# Patient Record
Sex: FEMALE | Race: WHITE | NOT HISPANIC OR LATINO
[De-identification: names, ages, dates, MRNs, and addresses within clinical notes are randomized per-mention and may not be internally consistent; named-entity substitution may affect disease eponyms.]

---

## 2017-03-03 PROBLEM — Z00.00 ENCOUNTER FOR PREVENTIVE HEALTH EXAMINATION: Status: ACTIVE | Noted: 2017-03-03

## 2017-03-08 ENCOUNTER — APPOINTMENT (OUTPATIENT)
Dept: UROGYNECOLOGY | Facility: CLINIC | Age: 52
End: 2017-03-08

## 2017-03-08 VITALS
BODY MASS INDEX: 17.58 KG/M2 | HEIGHT: 64 IN | WEIGHT: 103 LBS | DIASTOLIC BLOOD PRESSURE: 70 MMHG | SYSTOLIC BLOOD PRESSURE: 104 MMHG

## 2017-03-08 DIAGNOSIS — Z78.9 OTHER SPECIFIED HEALTH STATUS: ICD-10-CM

## 2017-03-08 DIAGNOSIS — Z86.79 PERSONAL HISTORY OF OTHER DISEASES OF THE CIRCULATORY SYSTEM: ICD-10-CM

## 2017-03-08 DIAGNOSIS — N95.2 POSTMENOPAUSAL ATROPHIC VAGINITIS: ICD-10-CM

## 2017-03-08 LAB
BILIRUB UR QL STRIP: NORMAL
CLARITY UR: CLEAR
COLLECTION METHOD: NORMAL
GLUCOSE UR-MCNC: NORMAL
HCG UR QL: 0.2 EU/DL
HGB UR QL STRIP.AUTO: NORMAL
KETONES UR-MCNC: NORMAL
LEUKOCYTE ESTERASE UR QL STRIP: NORMAL
NITRITE UR QL STRIP: NORMAL
PH UR STRIP: 7
PROT UR STRIP-MCNC: NORMAL
SP GR UR STRIP: 1.01

## 2017-03-09 PROBLEM — N95.2 VAGINAL ATROPHY: Status: ACTIVE | Noted: 2017-03-09

## 2017-04-06 ENCOUNTER — RESULT CHARGE (OUTPATIENT)
Age: 52
End: 2017-04-06

## 2017-04-06 ENCOUNTER — APPOINTMENT (OUTPATIENT)
Dept: UROGYNECOLOGY | Facility: CLINIC | Age: 52
End: 2017-04-06

## 2017-04-06 VITALS
SYSTOLIC BLOOD PRESSURE: 130 MMHG | DIASTOLIC BLOOD PRESSURE: 82 MMHG | WEIGHT: 103 LBS | BODY MASS INDEX: 17.58 KG/M2 | HEIGHT: 64 IN

## 2017-04-06 DIAGNOSIS — R35.1 NOCTURIA: ICD-10-CM

## 2017-04-06 DIAGNOSIS — Z86.79 PERSONAL HISTORY OF OTHER DISEASES OF THE CIRCULATORY SYSTEM: ICD-10-CM

## 2017-04-06 DIAGNOSIS — Z87.19 PERSONAL HISTORY OF OTHER DISEASES OF THE DIGESTIVE SYSTEM: ICD-10-CM

## 2017-04-06 DIAGNOSIS — N81.4 UTEROVAGINAL PROLAPSE, UNSPECIFIED: ICD-10-CM

## 2017-04-06 LAB
BILIRUB UR QL STRIP: NEGATIVE
CLARITY UR: CLEAR
COLLECTION METHOD: NORMAL
GLUCOSE UR-MCNC: NEGATIVE
HCG UR QL: 0.2 EU/DL
HGB UR QL STRIP.AUTO: NEGATIVE
KETONES UR-MCNC: NEGATIVE
LEUKOCYTE ESTERASE UR QL STRIP: NEGATIVE
NITRITE UR QL STRIP: NEGATIVE
PH UR STRIP: 7
PROT UR STRIP-MCNC: NEGATIVE
SP GR UR STRIP: 1.01

## 2017-04-06 RX ORDER — MESALAMINE 500 MG/1
500 CAPSULE ORAL
Qty: 240 | Refills: 0 | Status: ACTIVE | COMMUNITY
Start: 2016-03-11

## 2017-04-07 ENCOUNTER — RESULT REVIEW (OUTPATIENT)
Age: 52
End: 2017-04-07

## 2017-04-07 LAB
APPEARANCE: CLEAR
BACTERIA: NEGATIVE
BILIRUBIN URINE: NEGATIVE
BLOOD URINE: NEGATIVE
COLOR: YELLOW
GLUCOSE QUALITATIVE U: NORMAL MG/DL
HYALINE CASTS: 0 /LPF
KETONES URINE: NEGATIVE
LEUKOCYTE ESTERASE URINE: NEGATIVE
MICROSCOPIC-UA: NORMAL
NITRITE URINE: NEGATIVE
PH URINE: 6.5
PROTEIN URINE: NEGATIVE MG/DL
RED BLOOD CELLS URINE: 2 /HPF
SPECIFIC GRAVITY URINE: 1
SQUAMOUS EPITHELIAL CELLS: 0 /HPF
UROBILINOGEN URINE: NORMAL MG/DL
WHITE BLOOD CELLS URINE: 0 /HPF

## 2017-04-10 LAB — BACTERIA UR CULT: NORMAL

## 2017-04-11 LAB — CORE LAB FLUID CYTOLOGY: NORMAL

## 2020-11-17 ENCOUNTER — TRANSCRIPTION ENCOUNTER (OUTPATIENT)
Age: 55
End: 2020-11-17

## 2023-08-31 ENCOUNTER — APPOINTMENT (OUTPATIENT)
Dept: SURGERY | Facility: CLINIC | Age: 58
End: 2023-08-31
Payer: COMMERCIAL

## 2023-08-31 VITALS
WEIGHT: 98 LBS | HEIGHT: 60 IN | SYSTOLIC BLOOD PRESSURE: 136 MMHG | DIASTOLIC BLOOD PRESSURE: 89 MMHG | BODY MASS INDEX: 19.24 KG/M2 | HEART RATE: 96 BPM | TEMPERATURE: 98 F | OXYGEN SATURATION: 98 % | RESPIRATION RATE: 18 BRPM

## 2023-08-31 DIAGNOSIS — K50.90 CROHN'S DISEASE, UNSPECIFIED, W/OUT COMPLICATIONS: ICD-10-CM

## 2023-08-31 PROCEDURE — 99204 OFFICE O/P NEW MOD 45 MIN: CPT

## 2023-08-31 RX ORDER — RIFAXIMIN 550 MG/1
TABLET ORAL
Refills: 0 | Status: ACTIVE | COMMUNITY

## 2023-08-31 RX ORDER — CHOLECALCIFEROL (VITAMIN D3) 25 MCG
TABLET ORAL
Refills: 0 | Status: ACTIVE | COMMUNITY

## 2023-08-31 RX ORDER — AMOXICILLIN 500 MG/1
500 CAPSULE ORAL
Qty: 21 | Refills: 0 | Status: DISCONTINUED | COMMUNITY
Start: 2017-02-20 | End: 2023-08-31

## 2023-08-31 RX ORDER — IBUPROFEN 600 MG/1
600 TABLET, FILM COATED ORAL
Qty: 20 | Refills: 0 | Status: DISCONTINUED | COMMUNITY
Start: 2017-02-20 | End: 2023-08-31

## 2023-08-31 RX ORDER — ESTRADIOL 0.1 MG/G
0.1 CREAM VAGINAL
Qty: 1 | Refills: 3 | Status: DISCONTINUED | COMMUNITY
Start: 2017-03-09 | End: 2023-08-31

## 2023-08-31 RX ORDER — AZATHIOPRINE 50 1/1
50 TABLET ORAL
Qty: 45 | Refills: 0 | Status: DISCONTINUED | COMMUNITY
Start: 2016-03-11 | End: 2023-08-31

## 2023-08-31 NOTE — ASSESSMENT
[FreeTextEntry1] : I have seen and evaluated patient and I have corroborated all nursing input into this note.  Patient with symptomatic Crohn's strictures.  She limits her diet and has postprandial pain.  Recently she has had some weight loss.  An MR enterography demonstrated multiple strictures with active inflammation.  Recent colonoscopy demonstrated narrowing at the ileocolic junction.  I reviewed the case with the patient's gastroenterologist, Dr. Garrett.  The patient had been on Remicade a few years ago but had a reaction and it was discontinued.  She has not been on any other biologic therapy.  The patient will consult with advanced GI to see if she is a candidate for stricture dilation.  In addition, a trial of one of the newer biologic therapies will be considered.  If these nonsurgical options are unsuccessful the patient will return to my office to discuss surgery further and to make the appropriate arrangements.

## 2023-08-31 NOTE — HISTORY OF PRESENT ILLNESS
[FreeTextEntry1] : Candace is a 56 y/o female here for a consultation visit, multiple small bowel strictures with inflammation  S/p laparoscopic - assisted small bowel resection, laparoscopic appendectomy, and Meckel's diverticulectomy due to small bowel stricture/mass (possible Crohn's disease and Meckel's diverticulum on 7/19/04 by Dr. Betts  Colonoscopy on 7/25/23 - One 2 mm polyp at 50 cm proximal to the anus, removed with a cold biopsy forceps. Resected and retrieved. Stricture at the ileocolonic anastomosis.  Biopsied. Normal mucosa in the entire examined colon. Biopsied. Internal hemorrhoids. External hemorrhoids.   MR Enterography 08/18/23 - multiple small bowel strictures with imaging findings of active inflammation.  The degree of inflammation appears mildly increased when compared to 04/04/18.    Today patient reports she gets routine imaging of her abdomen every 2-3 years for Crohn's disease monitoring which she was diagnosed 19 years ago.   Patient was taking Remicade more than 4 years ago but had bad reaction so only took it for approximately 3 months.   Today pt reports no pain.  Does get random pain of abdomen (especially after eating large meals). Pain has been on-and-off for a year. Denies nausea and vomiting. Denies fever and chills. Every 3-4 days BMs, formed normal but has been loose recently and have urgency and lost 3-4 lbs since May of this year.  Does take Pentasa and Xifaxan for Crohn's. Low appetite (normal for pt). Not taking anticoagulants.

## 2023-08-31 NOTE — PHYSICAL EXAM
[Normal Breath Sounds] : Normal breath sounds [Normal Heart Sounds] : normal heart sounds [No Rash or Lesion] : No rash or lesion [Alert] : alert [Oriented to Person] : oriented to person [Oriented to Place] : oriented to place [Oriented to Time] : oriented to time [Calm] : calm [Abdomen Masses] : No abdominal masses [Abdomen Tenderness] : ~T No ~M abdominal tenderness [de-identified] : Mild distention.  [de-identified] : WNL [de-identified] : WNL [de-identified] : BKL [de-identified] : WNL ROM [de-identified] : WNL

## 2023-08-31 NOTE — END OF VISIT
Scheduled appt for Thursday . Mom confirmed date and time   [Time Spent: ___ minutes] : I have spent [unfilled] minutes of time on the encounter.

## 2023-08-31 NOTE — CONSULT LETTER
[Dear  ___] : Dear ~HARRIS, [Courtesy Letter:] : I had the pleasure of seeing your patient, [unfilled], in my office today. [Please see my note below.] : Please see my note below. [Consult Closing:] : Thank you very much for allowing me to participate in the care of this patient.  If you have any questions, please do not hesitate to contact me. [Sincerely,] : Sincerely, [FreeTextEntry2] : Dr. Glenn Garrett [FreeTextEntry3] : Patrick Betts M.D., F.GEORGETTE.C.S., F.A.S.C.R.S. Chief Colorectal Clinical Services, [facility] [DrArnold  ___] : Dr. CRUZ

## 2023-10-01 ENCOUNTER — NON-APPOINTMENT (OUTPATIENT)
Age: 58
End: 2023-10-01

## 2023-10-25 ENCOUNTER — APPOINTMENT (OUTPATIENT)
Dept: GASTROENTEROLOGY | Facility: CLINIC | Age: 58
End: 2023-10-25
Payer: COMMERCIAL

## 2023-10-25 VITALS
WEIGHT: 95 LBS | SYSTOLIC BLOOD PRESSURE: 125 MMHG | DIASTOLIC BLOOD PRESSURE: 75 MMHG | BODY MASS INDEX: 18.65 KG/M2 | HEIGHT: 60 IN | OXYGEN SATURATION: 98 % | HEART RATE: 99 BPM

## 2023-10-25 PROCEDURE — 99205 OFFICE O/P NEW HI 60 MIN: CPT

## 2024-01-02 ENCOUNTER — APPOINTMENT (OUTPATIENT)
Dept: GASTROENTEROLOGY | Facility: HOSPITAL | Age: 59
End: 2024-01-02

## 2024-04-22 ENCOUNTER — APPOINTMENT (OUTPATIENT)
Dept: COLORECTAL SURGERY | Facility: CLINIC | Age: 59
End: 2024-04-22
Payer: COMMERCIAL

## 2024-04-22 VITALS
HEART RATE: 106 BPM | DIASTOLIC BLOOD PRESSURE: 90 MMHG | BODY MASS INDEX: 19.04 KG/M2 | WEIGHT: 97 LBS | OXYGEN SATURATION: 100 % | SYSTOLIC BLOOD PRESSURE: 146 MMHG | HEIGHT: 60 IN

## 2024-04-22 DIAGNOSIS — K50.813 CROHN'S DISEASE OF BOTH SMALL AND LARGE INTESTINE WITH FISTULA: ICD-10-CM

## 2024-04-22 PROCEDURE — 99203 OFFICE O/P NEW LOW 30 MIN: CPT

## 2024-04-22 PROCEDURE — 99213 OFFICE O/P EST LOW 20 MIN: CPT

## 2024-04-23 NOTE — PROCEDURE
[FreeTextEntry1] : Colonoscopy 7/25/2023 (Dr. Garrett) (ileocolonic anastomosis reached - not traversed)  The ileocolonic anastomosis contained a moderate stenosis that was not able to be full traversed with the pediatric colonoscope. There was a large amount of blood from the site.  (1) 2mm polyp at 50cm prixmal to the anus removed stricture at ileocolonic anastomosis  Pathology (reviewed ) (pg 5 of 21)  5. colon polyp bx polypoid low grade dysplasia surrounding mucosa with no evidence of active colitis or granuloma

## 2024-04-23 NOTE — HISTORY OF PRESENT ILLNESS
[FreeTextEntry1] : Referred by Dr. 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 Betts) on lap SBR (2004)for a SBO She subsequently had intermittent symptoms and was not on systemic therapy for a long period of time Trialed Remicade 4 years ago with lip swelling so stopped Currently on Rinvoq  being evaluated for a different biologic (oral, ?ARNOL inhibitor?)  Her last colonoscopy was 7/2023  She is having bloating, gurgling. Weight is stable.  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

## 2024-04-23 NOTE — ASSESSMENT
[FreeTextEntry1] : 58 y.o female with crohns disease and hx of resection   She has subacute bowel obstruction with distention.  I strongly do believe as stated and advised she should have surgery within 1 month, she would like to work until the end of the year. Will need bowel resection with possible temporary ileostomy (20%)  I am concerned about her and I am ready for the surgery whenever she is ready.  I have provided her my cell phone number.  She is going to need a resection of the previous anastomosis and sigmoid resection and lysis of additions and assessing the area of the rest of the small bowel.  All risk benefits morbidity were explained she consents. 5-7 days in the hospital  4-6 weeks recovery

## 2024-04-23 NOTE — DATA REVIEWED
[FreeTextEntry1] : MRE 8/18/2023  Gallbladder - stable mild intrahepatic and extrahepatic biliary dilatation. stable contracted gallbladder versus dilated remnant cystic duct s/p cholecystectomy.  Terminal Ileum: there is severe wall thickening and mural hyperenhancement over a 12.5cm length of terminal ileum. This is associated with stricturing and muld upstream bowel dilatation measuring up to 3.2cm. Again seen are several small submucosal nodules measuring up to 0.9cm  Additional discontinous areas of active inflammation and stricturing involving the mid small bowel is evident for example on... One of these sites includes a small bowel anastomosis where there is upstream bowel dilatation measuring up to 4.6cm.  These areas are associated with restricted diffusion. There is no abscess  Colon/Rectum: New mural hyperenhancement and wall thickening of the rectosigmoid colon with restricted diffusion. Impression: Multiple small bowel strictures with imaging findings of active inflammation. The degree of inflammation appears mildly increased when compared to 04/04/2018.  New active inflammation in the rectosigmoid colon. No abscess or fistula.

## 2024-04-23 NOTE — PHYSICAL EXAM
[No Rash or Lesion] : No rash or lesion [Alert] : alert [Oriented to Person] : oriented to person [Oriented to Place] : oriented to place [Oriented to Time] : oriented to time [de-identified] : +tympanetic [de-identified] : thin female, NAD [de-identified] : [place]/AT [facility] [de-identified] : No C/C/E

## 2024-05-02 ENCOUNTER — NON-APPOINTMENT (OUTPATIENT)
Age: 59
End: 2024-05-02

## 2024-05-23 ENCOUNTER — APPOINTMENT (OUTPATIENT)
Dept: COLORECTAL SURGERY | Facility: CLINIC | Age: 59
End: 2024-05-23
Payer: COMMERCIAL

## 2024-05-23 ENCOUNTER — OUTPATIENT (OUTPATIENT)
Dept: OUTPATIENT SERVICES | Facility: HOSPITAL | Age: 59
LOS: 1 days | End: 2024-05-23
Payer: COMMERCIAL

## 2024-05-23 VITALS
DIASTOLIC BLOOD PRESSURE: 92 MMHG | HEART RATE: 89 BPM | WEIGHT: 100 LBS | SYSTOLIC BLOOD PRESSURE: 145 MMHG | BODY MASS INDEX: 19.63 KG/M2 | OXYGEN SATURATION: 100 % | HEIGHT: 60 IN | RESPIRATION RATE: 16 BRPM

## 2024-05-23 VITALS
SYSTOLIC BLOOD PRESSURE: 128 MMHG | RESPIRATION RATE: 18 BRPM | DIASTOLIC BLOOD PRESSURE: 82 MMHG | TEMPERATURE: 98 F | OXYGEN SATURATION: 98 % | WEIGHT: 96.56 LBS | HEART RATE: 76 BPM | HEIGHT: 60 IN

## 2024-05-23 DIAGNOSIS — Z90.711 ACQUIRED ABSENCE OF UTERUS WITH REMAINING CERVICAL STUMP: Chronic | ICD-10-CM

## 2024-05-23 DIAGNOSIS — Z98.890 OTHER SPECIFIED POSTPROCEDURAL STATES: Chronic | ICD-10-CM

## 2024-05-23 DIAGNOSIS — Z01.818 ENCOUNTER FOR OTHER PREPROCEDURAL EXAMINATION: ICD-10-CM

## 2024-05-23 DIAGNOSIS — K50.813 CROHN'S DISEASE OF BOTH SMALL AND LARGE INTESTINE WITH FISTULA: ICD-10-CM

## 2024-05-23 DIAGNOSIS — Z29.9 ENCOUNTER FOR PROPHYLACTIC MEASURES, UNSPECIFIED: ICD-10-CM

## 2024-05-23 DIAGNOSIS — Z90.49 ACQUIRED ABSENCE OF OTHER SPECIFIED PARTS OF DIGESTIVE TRACT: Chronic | ICD-10-CM

## 2024-05-23 LAB
ANION GAP SERPL CALC-SCNC: 11 MMOL/L — SIGNIFICANT CHANGE UP (ref 5–17)
BLD GP AB SCN SERPL QL: NEGATIVE — SIGNIFICANT CHANGE UP
BUN SERPL-MCNC: 9 MG/DL — SIGNIFICANT CHANGE UP (ref 7–23)
CALCIUM SERPL-MCNC: 10.1 MG/DL — SIGNIFICANT CHANGE UP (ref 8.4–10.5)
CHLORIDE SERPL-SCNC: 104 MMOL/L — SIGNIFICANT CHANGE UP (ref 96–108)
CO2 SERPL-SCNC: 24 MMOL/L — SIGNIFICANT CHANGE UP (ref 22–31)
CREAT SERPL-MCNC: 0.67 MG/DL — SIGNIFICANT CHANGE UP (ref 0.5–1.3)
EGFR: 103 ML/MIN/1.73M2 — SIGNIFICANT CHANGE UP
GLUCOSE SERPL-MCNC: 97 MG/DL — SIGNIFICANT CHANGE UP (ref 70–99)
HCT VFR BLD CALC: 34.5 % — SIGNIFICANT CHANGE UP (ref 34.5–45)
HGB BLD-MCNC: 11.8 G/DL — SIGNIFICANT CHANGE UP (ref 11.5–15.5)
MCHC RBC-ENTMCNC: 29.5 PG — SIGNIFICANT CHANGE UP (ref 27–34)
MCHC RBC-ENTMCNC: 34.2 GM/DL — SIGNIFICANT CHANGE UP (ref 32–36)
MCV RBC AUTO: 86.3 FL — SIGNIFICANT CHANGE UP (ref 80–100)
NRBC # BLD: 0 /100 WBCS — SIGNIFICANT CHANGE UP (ref 0–0)
PLATELET # BLD AUTO: 229 K/UL — SIGNIFICANT CHANGE UP (ref 150–400)
POTASSIUM SERPL-MCNC: 3.9 MMOL/L — SIGNIFICANT CHANGE UP (ref 3.5–5.3)
POTASSIUM SERPL-SCNC: 3.9 MMOL/L — SIGNIFICANT CHANGE UP (ref 3.5–5.3)
RBC # BLD: 4 M/UL — SIGNIFICANT CHANGE UP (ref 3.8–5.2)
RBC # FLD: 13.5 % — SIGNIFICANT CHANGE UP (ref 10.3–14.5)
RH IG SCN BLD-IMP: POSITIVE — SIGNIFICANT CHANGE UP
SODIUM SERPL-SCNC: 139 MMOL/L — SIGNIFICANT CHANGE UP (ref 135–145)
WBC # BLD: 5.46 K/UL — SIGNIFICANT CHANGE UP (ref 3.8–10.5)
WBC # FLD AUTO: 5.46 K/UL — SIGNIFICANT CHANGE UP (ref 3.8–10.5)

## 2024-05-23 PROCEDURE — 99213 OFFICE O/P EST LOW 20 MIN: CPT

## 2024-05-23 PROCEDURE — 86850 RBC ANTIBODY SCREEN: CPT

## 2024-05-23 PROCEDURE — 86900 BLOOD TYPING SEROLOGIC ABO: CPT

## 2024-05-23 PROCEDURE — 80048 BASIC METABOLIC PNL TOTAL CA: CPT

## 2024-05-23 PROCEDURE — 86901 BLOOD TYPING SEROLOGIC RH(D): CPT

## 2024-05-23 PROCEDURE — 85027 COMPLETE CBC AUTOMATED: CPT

## 2024-05-23 PROCEDURE — G0463: CPT

## 2024-05-23 RX ORDER — CEFOTETAN DISODIUM 1 G
2 VIAL (EA) INJECTION ONCE
Refills: 0 | Status: DISCONTINUED | OUTPATIENT
Start: 2024-05-29 | End: 2024-06-01

## 2024-05-23 NOTE — H&P PST ADULT - ATTENDING COMMENTS
for exp lap , possible LX,ileocolic resection plus minus loop ileostomy. Risks benefits were explained she consents  felice Morales

## 2024-05-23 NOTE — H&P PST ADULT - NSICDXPROCEDURE_GEN_ALL_CORE_FT
PROCEDURES:  Single incision laparoscopic small bowel resection 23-May-2024 17:16:23  Julieth Francis

## 2024-05-23 NOTE — H&P PST ADULT - NSICDXPASTSURGICALHX_GEN_ALL_CORE_FT
PAST SURGICAL HISTORY:  History of bladder surgery     History of colonoscopy     History of partial hysterectomy     NVD (normal vaginal delivery)     Status post small bowel resection

## 2024-05-23 NOTE — H&P PST ADULT - HISTORY OF PRESENT ILLNESS
58yr old female presents to [facility] for a scheduled Laparoscopic Small Bowel Resection Stricturoplasty and Laparoscopic Ileostomy Creation on 5/29/2024. PMH of Crohn's dx in the small bowel (dx'd in 2004 Dr. Betts) s/p lap SBR 2004 for a SBO. She now presents to [facility] for a scheduled Laparoscopic Small Bowel Resection Stricturoplasty and Laparoscopic Ileostomy Creation on 5/29/2024. Denies recent fevers, chills, cough, chest pain or SOB and feels well otherwise.

## 2024-05-23 NOTE — H&P PST ADULT - NSICDXPASTMEDICALHX_GEN_ALL_CORE_FT
PAST MEDICAL HISTORY:  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     History of Crohn's disease

## 2024-05-23 NOTE — H&P PST ADULT - ASSESSMENT
CAPRINI SCORE [CLOT updated 18]    AGE RELATED RISK FACTORS                                                       MOBILITY RELATED FACTORS  [ ] Age 41-60 years                                            (1 Point)                    [ ] Bed rest                                                        (1 Point)  [ ] Age: 61-74 years                                           (2 Points)                  [ ] Plaster cast                                                   (2 Points)  [ ] Age= 75 years                                              (3 Points)                    [ ] Bed bound for more than 72 hours                 (2 Points)    DISEASE RELATED RISK FACTORS                                               GENDER SPECIFIC FACTORS  [ ] Edema in the lower extremities                       (1 Point)              [ ] Pregnancy                                                     (1 Point)  [ ] Varicose veins                                               (1 Point)                     [ ] Post-partum < 6 weeks                                   (1 Point)             [ ] BMI > 25 Kg/m2                                            (1 Point)                     [ ] Hormonal therapy  or oral contraception          (1 Point)                 [ ] Sepsis (in the previous month)                        (1 Point)               [ ] History of pregnancy complications                 (1 point)  [ ] Pneumonia or serious lung disease                                               [ ] Unexplained or recurrent                     (1 Point)           (in the previous month)                               (1 Point)  [ ] Abnormal pulmonary function test                     (1 Point)                 SURGERY RELATED RISK FACTORS  [ ] Acute myocardial infarction                              (1 Point)               [ ]  Section                                             (1 Point)  [ ] Congestive heart failure (in the previous month)  (1 Point)      [ ] Minor surgery                                                  (1 Point)   [ ] Inflammatory bowel disease                             (1 Point)               [ ] Arthroscopic surgery                                        (2 Points)  [ ] Central venous access                                      (2 Points)                [ ] General surgery lasting more than 45 minutes (2 points)  [ ] Present or previous malignancy                     (2 Points)                [ ] Elective arthroplasty                                         (5 points)    [ ] Stroke (in the previous month)                          (5 Points)                                                                                                                                                           HEMATOLOGY RELATED FACTORS                                                 TRAUMA RELATED RISK FACTORS  [ ] Prior episodes of VTE                                     (3 Points)                [ ] Fracture of the hip, pelvis, or leg                       (5 Points)  [ ] Positive family history for VTE                         (3 Points)             [ ] Acute spinal cord injury (in the previous month)  (5 Points)  [ ] Prothrombin 14691 A                                     (3 Points)               [ ] Paralysis  (less than 1 month)                             (5 Points)  [ ] Factor V Leiden                                             (3 Points)                  [ ] Multiple Trauma within 1 month                        (5 Points)  [ ] Lupus anticoagulants                                     (3 Points)                                                           [ ] Anticardiolipin antibodies                               (3 Points)                                                       [ ] High homocysteine in the blood                      (3 Points)                                             [ ] Other congenital or acquired thrombophilia      (3 Points)                                                [ ] Heparin induced thrombocytopenia                  (3 Points)                                     Total Score [ 4  ]  Denies loose/cracked/removable teeth  Mallampati I

## 2024-05-23 NOTE — H&P PST ADULT - PROBLEM SELECTOR PLAN 1
Scheduled for sx on 5/29/2024. Surgical and chlorhexidine instructions reviewed and provided to pt. NPO post 11pm night before. As per pt to follow clear liquids one day before.

## 2024-05-28 ENCOUNTER — TRANSCRIPTION ENCOUNTER (OUTPATIENT)
Age: 59
End: 2024-05-28

## 2024-05-28 RX ORDER — ALENDRONATE SODIUM 35 MG/1
TABLET ORAL
Refills: 0 | Status: ACTIVE | COMMUNITY

## 2024-05-28 NOTE — HISTORY OF PRESENT ILLNESS
[FreeTextEntry1] : Referred by Dr. 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 Betts) on lap SBR (2004)for a SBO She subsequently had intermittent symptoms and was not on systemic therapy for a long period of time Trialed Remicade 4 years ago with lip swelling so stopped Currently on Rinvoq  5/23/2024 Here for a pre-op visit, discussed procedure. Last Rinvoq 1 week ago. She is still able to eat. No change in bowel habits. No nausea/vomiting.

## 2024-05-28 NOTE — ASSESSMENT
[FreeTextEntry1] : 58 y.o female with crohns disease and hx of resection   She has subacute bowel obstruction with distention. I strongly do believe as stated and advised she should have surgery within 1 month, she would like to work until the end of the year. Will need bowel resection with possible temporary ileostomy (20%)  I am concerned about her and I am ready for the surgery whenever she is ready. I have provided her my cell phone number. She is going to need a resection of the previous anastomosis and sigmoid resection and lysis of additions and assessing the area of the rest of the small bowel. All risk benefits morbidity were explained she consents. 5-7 days in the hospital 4-6 weeks recovery.  5/23/2024 - She is prepared for surgery. We discussed the risk, benefits and alternatives and she consents.Disussed bowel prep - clear liquid diet. She will go to PST. She met with Shilpa and we discussed that she may need a temporary ileostomy. Edwige Lux

## 2024-05-28 NOTE — PHYSICAL EXAM
[No Rash or Lesion] : No rash or lesion [Alert] : alert [Oriented to Person] : oriented to person [Oriented to Place] : oriented to place [Oriented to Time] : oriented to time [Calm] : calm [de-identified] : bloated [de-identified] : WDWN female, NAD [de-identified] : [place]/AT [facility] [de-identified] : No C/C/E noted

## 2024-05-29 ENCOUNTER — RESULT REVIEW (OUTPATIENT)
Age: 59
End: 2024-05-29

## 2024-05-29 ENCOUNTER — APPOINTMENT (OUTPATIENT)
Dept: COLORECTAL SURGERY | Facility: HOSPITAL | Age: 59
End: 2024-05-29
Payer: COMMERCIAL

## 2024-05-29 ENCOUNTER — INPATIENT (INPATIENT)
Facility: HOSPITAL | Age: 59
LOS: 11 days | Discharge: HOME HEALTH | DRG: 387 | End: 2024-06-10
Attending: COLON & RECTAL SURGERY | Admitting: COLON & RECTAL SURGERY
Payer: COMMERCIAL

## 2024-05-29 VITALS
OXYGEN SATURATION: 100 % | DIASTOLIC BLOOD PRESSURE: 84 MMHG | HEIGHT: 60 IN | RESPIRATION RATE: 18 BRPM | WEIGHT: 96.56 LBS | SYSTOLIC BLOOD PRESSURE: 138 MMHG | HEART RATE: 97 BPM | TEMPERATURE: 98 F

## 2024-05-29 DIAGNOSIS — Z98.890 OTHER SPECIFIED POSTPROCEDURAL STATES: Chronic | ICD-10-CM

## 2024-05-29 DIAGNOSIS — K50.813 CROHN'S DISEASE OF BOTH SMALL AND LARGE INTESTINE WITH FISTULA: ICD-10-CM

## 2024-05-29 DIAGNOSIS — Z90.49 ACQUIRED ABSENCE OF OTHER SPECIFIED PARTS OF DIGESTIVE TRACT: Chronic | ICD-10-CM

## 2024-05-29 DIAGNOSIS — Z90.711 ACQUIRED ABSENCE OF UTERUS WITH REMAINING CERVICAL STUMP: Chronic | ICD-10-CM

## 2024-05-29 LAB
ANION GAP SERPL CALC-SCNC: 15 MMOL/L — SIGNIFICANT CHANGE UP (ref 5–17)
BUN SERPL-MCNC: 8 MG/DL — SIGNIFICANT CHANGE UP (ref 7–23)
CALCIUM SERPL-MCNC: 7.6 MG/DL — LOW (ref 8.4–10.5)
CHLORIDE SERPL-SCNC: 103 MMOL/L — SIGNIFICANT CHANGE UP (ref 96–108)
CO2 SERPL-SCNC: 21 MMOL/L — LOW (ref 22–31)
CREAT SERPL-MCNC: 0.7 MG/DL — SIGNIFICANT CHANGE UP (ref 0.5–1.3)
EGFR: 101 ML/MIN/1.73M2 — SIGNIFICANT CHANGE UP
GLUCOSE SERPL-MCNC: 186 MG/DL — HIGH (ref 70–99)
HCT VFR BLD CALC: 31.4 % — LOW (ref 34.5–45)
HGB BLD-MCNC: 10.8 G/DL — LOW (ref 11.5–15.5)
MCHC RBC-ENTMCNC: 29.8 PG — SIGNIFICANT CHANGE UP (ref 27–34)
MCHC RBC-ENTMCNC: 34.4 GM/DL — SIGNIFICANT CHANGE UP (ref 32–36)
MCV RBC AUTO: 86.5 FL — SIGNIFICANT CHANGE UP (ref 80–100)
NRBC # BLD: 0 /100 WBCS — SIGNIFICANT CHANGE UP (ref 0–0)
PLATELET # BLD AUTO: 275 K/UL — SIGNIFICANT CHANGE UP (ref 150–400)
POTASSIUM SERPL-MCNC: 3.4 MMOL/L — LOW (ref 3.5–5.3)
POTASSIUM SERPL-SCNC: 3.4 MMOL/L — LOW (ref 3.5–5.3)
RBC # BLD: 3.63 M/UL — LOW (ref 3.8–5.2)
RBC # FLD: 13.7 % — SIGNIFICANT CHANGE UP (ref 10.3–14.5)
SODIUM SERPL-SCNC: 139 MMOL/L — SIGNIFICANT CHANGE UP (ref 135–145)
WBC # BLD: 17.46 K/UL — HIGH (ref 3.8–10.5)
WBC # FLD AUTO: 17.46 K/UL — HIGH (ref 3.8–10.5)

## 2024-05-29 PROCEDURE — 88309 TISSUE EXAM BY PATHOLOGIST: CPT | Mod: 26

## 2024-05-29 PROCEDURE — 44205 LAP COLECTOMY PART W/ILEUM: CPT | Mod: 80

## 2024-05-29 PROCEDURE — 44205 LAP COLECTOMY PART W/ILEUM: CPT

## 2024-05-29 PROCEDURE — 44187 LAP ILEO/JEJUNO-STOMY: CPT

## 2024-05-29 PROCEDURE — 44187 LAP ILEO/JEJUNO-STOMY: CPT | Mod: 80

## 2024-05-29 RX ORDER — POTASSIUM CHLORIDE 20 MEQ
10 PACKET (EA) ORAL
Refills: 0 | Status: COMPLETED | OUTPATIENT
Start: 2024-05-29 | End: 2024-05-29

## 2024-05-29 RX ORDER — ONDANSETRON 8 MG/1
4 TABLET, FILM COATED ORAL EVERY 6 HOURS
Refills: 0 | Status: DISCONTINUED | OUTPATIENT
Start: 2024-05-29 | End: 2024-06-02

## 2024-05-29 RX ORDER — NALOXONE HYDROCHLORIDE 4 MG/.1ML
0.1 SPRAY NASAL
Refills: 0 | Status: DISCONTINUED | OUTPATIENT
Start: 2024-05-29 | End: 2024-06-02

## 2024-05-29 RX ORDER — SODIUM CHLORIDE 9 MG/ML
3 INJECTION INTRAMUSCULAR; INTRAVENOUS; SUBCUTANEOUS EVERY 8 HOURS
Refills: 0 | Status: DISCONTINUED | OUTPATIENT
Start: 2024-05-29 | End: 2024-05-29

## 2024-05-29 RX ORDER — CHLORHEXIDINE GLUCONATE 213 G/1000ML
1 SOLUTION TOPICAL ONCE
Refills: 0 | Status: DISCONTINUED | OUTPATIENT
Start: 2024-05-29 | End: 2024-05-29

## 2024-05-29 RX ORDER — SODIUM CHLORIDE 9 MG/ML
1000 INJECTION, SOLUTION INTRAVENOUS
Refills: 0 | Status: DISCONTINUED | OUTPATIENT
Start: 2024-05-29 | End: 2024-06-10

## 2024-05-29 RX ORDER — LIDOCAINE HCL 20 MG/ML
0.2 VIAL (ML) INJECTION ONCE
Refills: 0 | Status: DISCONTINUED | OUTPATIENT
Start: 2024-05-29 | End: 2024-05-29

## 2024-05-29 RX ORDER — HEPARIN SODIUM 5000 [USP'U]/ML
5000 INJECTION INTRAVENOUS; SUBCUTANEOUS EVERY 8 HOURS
Refills: 0 | Status: DISCONTINUED | OUTPATIENT
Start: 2024-05-29 | End: 2024-06-02

## 2024-05-29 RX ORDER — SODIUM CHLORIDE 9 MG/ML
500 INJECTION, SOLUTION INTRAVENOUS ONCE
Refills: 0 | Status: COMPLETED | OUTPATIENT
Start: 2024-05-29 | End: 2024-05-29

## 2024-05-29 RX ORDER — ONDANSETRON 8 MG/1
4 TABLET, FILM COATED ORAL ONCE
Refills: 0 | Status: DISCONTINUED | OUTPATIENT
Start: 2024-05-29 | End: 2024-05-30

## 2024-05-29 RX ORDER — HYDROMORPHONE HYDROCHLORIDE 2 MG/ML
250 INJECTION INTRAMUSCULAR; INTRAVENOUS; SUBCUTANEOUS
Refills: 0 | Status: DISCONTINUED | OUTPATIENT
Start: 2024-05-29 | End: 2024-06-02

## 2024-05-29 RX ORDER — NALBUPHINE HYDROCHLORIDE 10 MG/ML
2.5 INJECTION, SOLUTION INTRAMUSCULAR; INTRAVENOUS; SUBCUTANEOUS EVERY 6 HOURS
Refills: 0 | Status: DISCONTINUED | OUTPATIENT
Start: 2024-05-29 | End: 2024-06-02

## 2024-05-29 RX ORDER — CHOLECALCIFEROL (VITAMIN D3) 125 MCG
1 CAPSULE ORAL
Refills: 0 | DISCHARGE

## 2024-05-29 RX ORDER — ACETAMINOPHEN 500 MG
750 TABLET ORAL EVERY 6 HOURS
Refills: 0 | Status: COMPLETED | OUTPATIENT
Start: 2024-05-29 | End: 2024-05-30

## 2024-05-29 RX ORDER — PREGABALIN 225 MG/1
1 CAPSULE ORAL
Refills: 0 | DISCHARGE

## 2024-05-29 RX ADMIN — HYDROMORPHONE HYDROCHLORIDE 250 MILLILITER(S): 2 INJECTION INTRAMUSCULAR; INTRAVENOUS; SUBCUTANEOUS at 20:02

## 2024-05-29 RX ADMIN — HYDROMORPHONE HYDROCHLORIDE 250 MILLILITER(S): 2 INJECTION INTRAMUSCULAR; INTRAVENOUS; SUBCUTANEOUS at 21:46

## 2024-05-29 RX ADMIN — SODIUM CHLORIDE 1000 MILLILITER(S): 9 INJECTION, SOLUTION INTRAVENOUS at 19:40

## 2024-05-29 RX ADMIN — HEPARIN SODIUM 5000 UNIT(S): 5000 INJECTION INTRAVENOUS; SUBCUTANEOUS at 21:48

## 2024-05-29 RX ADMIN — HYDROMORPHONE HYDROCHLORIDE 250 MILLILITER(S): 2 INJECTION INTRAMUSCULAR; INTRAVENOUS; SUBCUTANEOUS at 18:43

## 2024-05-29 RX ADMIN — SODIUM CHLORIDE 100 MILLILITER(S): 9 INJECTION, SOLUTION INTRAVENOUS at 21:06

## 2024-05-29 RX ADMIN — Medication 100 MILLIEQUIVALENT(S): at 20:57

## 2024-05-29 RX ADMIN — Medication 100 MILLIEQUIVALENT(S): at 22:09

## 2024-05-29 RX ADMIN — Medication 100 MILLIEQUIVALENT(S): at 23:32

## 2024-05-29 NOTE — PATIENT PROFILE ADULT - FALL HARM RISK - UNIVERSAL INTERVENTIONS
Bed in lowest position, wheels locked, appropriate side rails in place/Call bell, personal items and telephone in reach/Instruct patient to call for assistance before getting out of bed or chair/Non-slip footwear when patient is out of bed/[place] to call system/Physically safe environment - no spills, clutter or unnecessary equipment/Purposeful Proactive Rounding/Room/bathroom lighting operational, light cord in reach

## 2024-05-29 NOTE — BRIEF OPERATIVE NOTE - OPERATION/FINDINGS
Strictures at ileocolic anastomosis and segmental stricture 20cm proximal to that  Diseased segment resected  Primary hand-sewn ileocolic anastomosis with diverting loop ileostomy

## 2024-05-29 NOTE — BRIEF OPERATIVE NOTE - CO SURGEON
Sawyer Morales (Attending) You presented to the ED after a near syncopal episode, likely vasovagal due to dehydration. You were given 2 liters of IV fluids. Follow up with your primary care doctor in 24-48 hours.     WHAT YOU NEED TO KNOW:    Syncope is also called fainting or passing out. Syncope is a sudden, temporary loss of consciousness, followed by a fall from a standing or sitting position. Syncope is usually not a serious problem, and children usually recover quickly after an episode. Syncope can sometimes be a sign of a medical condition that needs to be treated.    DISCHARGE INSTRUCTIONS:    Call 911 for any of the following:     You lose consciousness.    You chest pain and trouble breathing.    Return to the emergency department if:     You have a seizure.    You faint, hit your head, and are bleeding.    You faint when you exercise

## 2024-05-29 NOTE — CHART NOTE - NSCHARTNOTEFT_GEN_A_CORE
SURGERY POST OP CHECK    STATUS POST PROCEDURE:    SUBJECTIVE: Pt seen and examined without complaints. Pain is controlled. Denies CP/SOB/N/V.     OBJECTIVE:  Vital Signs Last 24 Hrs  T(C): 36.5 (29 May 2024 21:00), Max: 36.5 (29 May 2024 18:20)  T(F): 97.7 (29 May 2024 21:00), Max: 97.7 (29 May 2024 18:20)  HR: 86 (29 May 2024 21:00) (80 - 99)  BP: 101/59 (29 May 2024 21:00) (75/47 - 138/84)  BP(mean): 77 (29 May 2024 21:00) (57 - 79)  RR: 16 (29 May 2024 21:00) (12 - 21)  SpO2: 100% (29 May 2024 21:00) (94% - 100%)    Parameters below as of 29 May 2024 21:00  Patient On (Oxygen Delivery Method): nasal cannula  O2 Flow (L/min): 2    I&O's Summary    29 May 2024 07:01  -  29 May 2024 21:32  --------------------------------------------------------  IN: 0 mL / OUT: 30 mL / NET: -30 mL      PHYSICAL EXAM:  Gen: NAD, A&Ox3  Pulm: No respiratory distress, no subcostal retractions  CV: RRR, no JVD  Abd: Soft, appropriate incisional tenderness, ostomy with bowel sweat, ND, incision site dressings c/d/i  Extremities: Grossly symmetric    ASSESSMENT/PLAN: HPI:  58yr old female with PMH of Crohn's dx in the small bowel (dx'd in 2004 Dr. Betts) s/p lap SBR 2004 for a SBO.     Now s/p lap ileocolic resection for strictures at ileocolic anastomosis and proxima segmental stricture 20cm, creation of DLI. Tolerated procedure well. PACU labs H&H stable, mild electrolyte derangement.    Plan   - Pain control PCEA, IV Tylenol  - f/u AM labs    - Diet: NPO/IVF  - Monitor ostomy function   - OOB as tolerated  - Incentive spirometry  - DVT prophylaxis: SubQ Heparin    Red Surgery   [id number]

## 2024-05-29 NOTE — PRE-OP CHECKLIST - HAIR REMOVAL
Reason for Call: Request for an order or referral:    Order or referral being requested: Order for [facility] signed by MD, faxed back and copy to scanning     Date needed: at your convenience    Has the patient been seen by the PCP for this problem? NO    Additional comments:     Call taken on 12/16/2020 at 8:22 AM by Jewels Stevens     hair removal not indicated

## 2024-05-29 NOTE — PATIENT PROFILE ADULT - NSPROPOAPRESSUREINJURY_GEN_A_NUR
"  HPI    Date: 4/10/2018   Age: 65 year old  Ethnicity:    Sex: male  : 1952   Lives In: [place], [place]      Diagnosis: Poorly Differentiated Adenocarcinoma, consistent with Lung primary    Prior radiation therapy:   Site Treated: right lung  Facility: [facility]  Dates: 2017  Dose: at      Prior chemotherapy: See Below        Pain at time of consult, management plan if yes: pt denies pain at time of consult  Does pt have a living will: pt has   Is Pt Pregnant: N?A  Does Pt have any implanted cardiac devices: pt has no implanted cardiac devices    Doctors to \"cc\":  Sandeep Randolph-Med/Onc, Dr.Andrea Lind-primary at [facility]    RN time with patient: 55 minutes    Pt was educated on Gamma Knife Procedure  ;yes        Review Since Diagnosis:    Pt stating in December knew he had to go to hospital, SOB, said pneumonia, went to ER, more testing    2016; near complete obstruction of right lung      1/10/17; biopsy of tissue pt coughed up, showed poorly differentiated adenocarcinoma consistent with pulmonary primary    Chemo and Radiation, stable since in [facility]    Chemo and Radiation was all done 2017    Routine checks, everything was good, no mets    Pt starting having trouble hearing in right ear and that is what prompted a Brain MRI    18; Brain MRI, new 1.3 x 1 cm enhancing lesion right temporal                                  Stable 0.8 x 0.7 cm lesion overlying the left parietal lobe                                  0.5 x 1 cm lesion right IAC (previously was 0.4 x 0.7 cm)    18; pt saw Dr. Randolph, put on 4 mg dexamethasone bid, wants a PET before decides what to do with brain lesion    18; PET/CT, mildly glucose avid right cervical lymph node-nonspecific, mediastinal soft tissue mass-like density unchanged in size-no activity to suggest progressing malignancy, no mets below diaphragm    18; pt saw Dr. Randolph, doing well, referred to " neurosurgery for brain met     No neurosurgeon in [place] per pt request    Chief Complaint: weakness all over especially legs, pt has fallen, broken left wrist, no improvement since steroid, pt denies a headache, pt also having some slurred speech and expressive aphagia            Review of Systems   Constitutional: Positive for malaise/fatigue. Negative for fever.   HENT: Positive for hearing loss.         Hearing decreased right ear   Eyes: Negative for blurred vision, double vision and photophobia.   Respiratory: Positive for cough, sputum production and shortness of breath.    Cardiovascular: Positive for leg swelling. Negative for chest pain.        Leg swelling bilateral, compression socks   Gastrointestinal: Negative for blood in stool, constipation, diarrhea and vomiting.        Pt trouble swallowing hard foods as chokes from saliva   Musculoskeletal: Negative.    Neurological: Positive for weakness. Negative for dizziness, seizures and headaches.   Psychiatric/Behavioral: Negative for depression.                no

## 2024-05-30 LAB
ANION GAP SERPL CALC-SCNC: 12 MMOL/L — SIGNIFICANT CHANGE UP (ref 5–17)
BUN SERPL-MCNC: 6 MG/DL — LOW (ref 7–23)
CALCIUM SERPL-MCNC: 7.9 MG/DL — LOW (ref 8.4–10.5)
CHLORIDE SERPL-SCNC: 107 MMOL/L — SIGNIFICANT CHANGE UP (ref 96–108)
CO2 SERPL-SCNC: 21 MMOL/L — LOW (ref 22–31)
CREAT SERPL-MCNC: 0.68 MG/DL — SIGNIFICANT CHANGE UP (ref 0.5–1.3)
EGFR: 102 ML/MIN/1.73M2 — SIGNIFICANT CHANGE UP
GLUCOSE SERPL-MCNC: 137 MG/DL — HIGH (ref 70–99)
HCT VFR BLD CALC: 25.1 % — LOW (ref 34.5–45)
HCT VFR BLD CALC: 28.2 % — LOW (ref 34.5–45)
HGB BLD-MCNC: 8.7 G/DL — LOW (ref 11.5–15.5)
HGB BLD-MCNC: 9.7 G/DL — LOW (ref 11.5–15.5)
MAGNESIUM SERPL-MCNC: 2 MG/DL — SIGNIFICANT CHANGE UP (ref 1.6–2.6)
MCHC RBC-ENTMCNC: 30.1 PG — SIGNIFICANT CHANGE UP (ref 27–34)
MCHC RBC-ENTMCNC: 30.2 PG — SIGNIFICANT CHANGE UP (ref 27–34)
MCHC RBC-ENTMCNC: 34.4 GM/DL — SIGNIFICANT CHANGE UP (ref 32–36)
MCHC RBC-ENTMCNC: 34.7 GM/DL — SIGNIFICANT CHANGE UP (ref 32–36)
MCV RBC AUTO: 86.9 FL — SIGNIFICANT CHANGE UP (ref 80–100)
MCV RBC AUTO: 87.9 FL — SIGNIFICANT CHANGE UP (ref 80–100)
NRBC # BLD: 0 /100 WBCS — SIGNIFICANT CHANGE UP (ref 0–0)
NRBC # BLD: 0 /100 WBCS — SIGNIFICANT CHANGE UP (ref 0–0)
PHOSPHATE SERPL-MCNC: 2.6 MG/DL — SIGNIFICANT CHANGE UP (ref 2.5–4.5)
PLATELET # BLD AUTO: 188 K/UL — SIGNIFICANT CHANGE UP (ref 150–400)
PLATELET # BLD AUTO: 209 K/UL — SIGNIFICANT CHANGE UP (ref 150–400)
POTASSIUM SERPL-MCNC: 4.4 MMOL/L — SIGNIFICANT CHANGE UP (ref 3.5–5.3)
POTASSIUM SERPL-SCNC: 4.4 MMOL/L — SIGNIFICANT CHANGE UP (ref 3.5–5.3)
RBC # BLD: 2.89 M/UL — LOW (ref 3.8–5.2)
RBC # BLD: 3.21 M/UL — LOW (ref 3.8–5.2)
RBC # FLD: 13.9 % — SIGNIFICANT CHANGE UP (ref 10.3–14.5)
RBC # FLD: 14.2 % — SIGNIFICANT CHANGE UP (ref 10.3–14.5)
SODIUM SERPL-SCNC: 140 MMOL/L — SIGNIFICANT CHANGE UP (ref 135–145)
WBC # BLD: 14.3 K/UL — HIGH (ref 3.8–10.5)
WBC # BLD: 17.12 K/UL — HIGH (ref 3.8–10.5)
WBC # FLD AUTO: 14.3 K/UL — HIGH (ref 3.8–10.5)
WBC # FLD AUTO: 17.12 K/UL — HIGH (ref 3.8–10.5)

## 2024-05-30 RX ADMIN — Medication 127.5 MILLIMOLE(S): at 11:34

## 2024-05-30 RX ADMIN — Medication 750 MILLIGRAM(S): at 01:45

## 2024-05-30 RX ADMIN — ONDANSETRON 4 MILLIGRAM(S): 8 TABLET, FILM COATED ORAL at 00:06

## 2024-05-30 RX ADMIN — HYDROMORPHONE HYDROCHLORIDE 250 MILLILITER(S): 2 INJECTION INTRAMUSCULAR; INTRAVENOUS; SUBCUTANEOUS at 18:30

## 2024-05-30 RX ADMIN — HYDROMORPHONE HYDROCHLORIDE 250 MILLILITER(S): 2 INJECTION INTRAMUSCULAR; INTRAVENOUS; SUBCUTANEOUS at 19:05

## 2024-05-30 RX ADMIN — HYDROMORPHONE HYDROCHLORIDE 250 MILLILITER(S): 2 INJECTION INTRAMUSCULAR; INTRAVENOUS; SUBCUTANEOUS at 07:25

## 2024-05-30 RX ADMIN — Medication 300 MILLIGRAM(S): at 17:51

## 2024-05-30 RX ADMIN — HEPARIN SODIUM 5000 UNIT(S): 5000 INJECTION INTRAVENOUS; SUBCUTANEOUS at 14:07

## 2024-05-30 RX ADMIN — HYDROMORPHONE HYDROCHLORIDE 250 MILLILITER(S): 2 INJECTION INTRAMUSCULAR; INTRAVENOUS; SUBCUTANEOUS at 00:13

## 2024-05-30 RX ADMIN — HEPARIN SODIUM 5000 UNIT(S): 5000 INJECTION INTRAVENOUS; SUBCUTANEOUS at 05:40

## 2024-05-30 RX ADMIN — ONDANSETRON 4 MILLIGRAM(S): 8 TABLET, FILM COATED ORAL at 11:34

## 2024-05-30 RX ADMIN — HYDROMORPHONE HYDROCHLORIDE 250 MILLILITER(S): 2 INJECTION INTRAMUSCULAR; INTRAVENOUS; SUBCUTANEOUS at 16:07

## 2024-05-30 RX ADMIN — Medication 300 MILLIGRAM(S): at 06:38

## 2024-05-30 RX ADMIN — HEPARIN SODIUM 5000 UNIT(S): 5000 INJECTION INTRAVENOUS; SUBCUTANEOUS at 21:14

## 2024-05-30 RX ADMIN — Medication 300 MILLIGRAM(S): at 01:15

## 2024-05-30 RX ADMIN — Medication 300 MILLIGRAM(S): at 12:46

## 2024-05-30 RX ADMIN — ONDANSETRON 4 MILLIGRAM(S): 8 TABLET, FILM COATED ORAL at 17:51

## 2024-05-30 NOTE — PROGRESS NOTE ADULT - ASSESSMENT
58yr old female presents to [facility] for a scheduled Laparoscopic Small Bowel Resection Stricturoplasty and Laparoscopic Ileostomy Creation on 5/29/2024. PMH of Crohn's dx in the small bowel (dx'd in 2004 Dr. Betts) s/p lap SBR 2004 for a SBO. She's now s/p Laparoscopic Small Bowel Resection Stricturoplasty and Laparoscopic Ileostomy Creation on 5/29/2024. Recovering well.    Plan:  - NPO/IVF  - PCA  - [place]  - Ostomy teaching  - PT

## 2024-05-30 NOTE — PHYSICAL THERAPY INITIAL EVALUATION ADULT - PERTINENT HX OF CURRENT PROBLEM, REHAB EVAL
58 y.o. F PMH of Crohn's dx in the small bowel (dx'd in 2004 Dr. Betts) s/p lap SBR 2004 for a SBO. Denies recent fevers, chills, cough, chest pain or SOB and feels well otherwise. Now s/p s/p Laparoscopic Small Bowel Resection Stricturoplasty and Laparoscopic Ileostomy Creation on 5/29/24.

## 2024-05-30 NOTE — PROGRESS NOTE ADULT - SUBJECTIVE AND OBJECTIVE BOX
Day 1 of Anesthesia Pain Management Service    SUBJECTIVE: Pain is good  Pain Scale Score:   Refer to charted pain scores    THERAPY:  [X] Epidural Bupivacaine 0.0625% and Hydromorphone         [X] 10 micrograms/mL 	[ ] 5 micrograms/mL  [ ] Epidural Ropivacaine 0.2% plain – 1 mg/mL    Demand dose: 3 mL  Lockout: 15 minutes  Continuous Rate: 4 mL/hr    MEDICATIONS  (STANDING):  acetaminophen IVPB 750 milliGRAM(s) IV Intermittent every 6 hours  cefoTEtan  IVPB 2 Gram(s) IV Intermittent once  heparin   Injectable 5000 Unit(s) SubCutaneous every 8 hours  hydromorphone (10 MICROgram(s)/mL) + bupivacaine 0.0625% in 0.9% Sodium Chloride PCEA 250 milliLiter(s) Epidural PCA Continuous  lactated ringers. 1000 milliLiter(s) (100 mL/Hr) IV Continuous <Continuous>  sodium phosphate 15 milliMole(s)/250 mL IVPB 15 milliMole(s) IV Intermittent once    MEDICATIONS  (PRN):  hydromorphone (10 MICROgram(s)/mL) + bupivacaine 0.0625% in 0.9% Sodium Chloride PCEA Rescue Clinician  Bolus 5 milliLiter(s) Epidural every 15 minutes PRN for Pain Score greater than 6  nalbuphine Injectable 2.5 milliGRAM(s) IV Push every 6 hours PRN Pruritus  naloxone Injectable 0.1 milliGRAM(s) IV Push every 3 minutes PRN For ANY of the following changes in patient status:  A. RR LESS THAN 10 breaths per minute, B. Oxygen saturation LESS THAN 90%, C. Sedation score of 6  ondansetron Injectable 4 milliGRAM(s) IV Push every 6 hours PRN Nausea      OBJECTIVE:    Assessment of Catheter Site:    [X] Epidural 	  [X] Dressing intact	[X] Site non-tender	[X] Site without erythema, discharge, edema  [X] Epidural tubing and connection checked	[X] Gross neurological exam within normal limits  [ ] Catheter removed – tip intact		[X] Afebrile	            [ ] Febrile: ___                          9.7    17.12 )-----------( 209      ( 30 May 2024 07:12 )             28.2     Vital Signs Last 24 Hrs  T(C): 37.1 (05-30-24 @ 09:34), Max: 37.1 (05-30-24 @ 09:13)  T(F): 98.7 (05-30-24 @ 09:34), Max: 98.7 (05-30-24 @ 09:13)  HR: 95 (05-30-24 @ 09:34) (80 - 100)  BP: 105/70 (05-30-24 @ 09:34) (75/47 - 138/84)  BP(mean): 83 (05-29-24 @ 23:30) (57 - 88)  RR: 16 (05-30-24 @ 09:34) (12 - 21)  SpO2: 100% (05-30-24 @ 09:34) (94% - 100%)      Sedation Score:	[X] Alert  	[ ] Drowsy	[ ] Arousable  [ ] Asleep     [ ] Unresponsive    Side Effects:	[X] None	[ ] Nausea	[ ] Vomiting   [ ] Pruritus  		[ ] Weakness     [ ] Numbness	[ ] Other:    ASSESSMENT/ PLAN:    Therapy:	[X] Continue   [ ] Discontinue   [ ] Change to PRN Analgesics   [ ] Change to PCA    Documentation and Verification of current medications:  [X] Done	[ ] Not done, not eligible, reason:    COMMENTS: Endorsing good analgesia with PCEA. Continue.

## 2024-05-30 NOTE — ADVANCED PRACTICE NURSE CONSULT - ASSESSMENT
In @bedside w/pt to initiate stoma teaching. Chart reviewed &events noted. Pt in bed awake &alert, "feeling tired" no c/o pain .Introduced self &role of COCN. Pt w/good understanding of surgical procedure including "the bag" "I know what it's for". Reviewed anatomy& function& basics of stoma care. Demonstrated pouching system opening/ closure.Pt observed but did not practice. Discussed returning to "normalcy " w/stoma creation ( clothing,shower , bathing activity, lifestyle ,etc).Pt receptive to teaching &expresses understanding. Discussed steps for pouch emptying & "burping" pouch to release air. Pouch seal intact ;stoma pink & viable + liquid bilious drainage in pouch.  Ileostomy  educational materials provided. Pt encourage to review materials, practice w/ pouch & emptying.

## 2024-05-30 NOTE — PHYSICAL THERAPY INITIAL EVALUATION ADULT - ADDITIONAL COMMENTS
Pt resides in a pvt home w/ family, 6 steps to enter (no HR), one flight to negotiate inside (+HR). PTA pt was independent w/ all mobility & ADL's. Did not use an AD for ambulation.

## 2024-05-30 NOTE — PROGRESS NOTE ADULT - SUBJECTIVE AND OBJECTIVE BOX
SUBJECTIVE: Patient seen and examined on AM rounds. Patient reports that they're feeling well. Denies fever, chills. Reports pain as controlled. No complaints at this time.     Vital Signs Last 24 Hrs  T(C): 36.7 (30 May 2024 05:13), Max: 36.7 (30 May 2024 02:11)  T(F): 98.1 (30 May 2024 05:13), Max: 98.1 (30 May 2024 02:11)  HR: 95 (30 May 2024 05:13) (80 - 100)  BP: 105/68 (30 May 2024 05:13) (75/47 - 138/84)  BP(mean): 83 (29 May 2024 23:30) (57 - 88)  RR: 18 (30 May 2024 05:13) (12 - 21)  SpO2: 100% (30 May 2024 05:13) (94% - 100%)    Parameters below as of 30 May 2024 05:13  Patient On (Oxygen Delivery Method): nasal cannula  O2 Flow (L/min): 2      General Appearance: Appears well, NAD  Neck: Supple  Chest: Equal expansion bilaterally  CV: Pulse regular presently  Abdomen: Soft, nontense, appropriate incisional tenderness, dressings clean and dry and intact  Extremities: [facility]    I&O's Summary    29 May 2024 07:01  -  30 May 2024 07:00  --------------------------------------------------------  IN: 1300 mL / OUT: 920 mL / NET: 380 mL      I&O's Detail    29 May 2024 07:01  -  30 May 2024 07:00  --------------------------------------------------------  IN:    IV PiggyBack: 300 mL    Lactated Ringers: 1000 mL  Total IN: 1300 mL    OUT:    Ileostomy (mL): 50 mL    Indwelling Catheter - Urethral (mL): 870 mL  Total OUT: 920 mL    Total NET: 380 mL          LABS:                        9.7    17.12 )-----------( 209      ( 30 May 2024 07:12 )             28.2     05-30    140  |  107  |  6<L>  ----------------------------<  137<H>  4.4   |  21<L>  |  0.68    Ca    7.9<L>      30 May 2024 07:07  Phos  2.6     05-30  Mg     2.0     05-30        Urinalysis Basic - ( 30 May 2024 07:07 )    Color: x / Appearance: x / SG: x / pH: x  Gluc: 137 mg/dL / Ketone: x  / Bili: x / Urobili: x   Blood: x / Protein: x / Nitrite: x   Leuk Esterase: x / RBC: x / WBC x   Sq Epi: x / Non Sq Epi: x / Bacteria: x        RADIOLOGY & ADDITIONAL STUDIES:

## 2024-05-31 LAB
ANION GAP SERPL CALC-SCNC: 12 MMOL/L — SIGNIFICANT CHANGE UP (ref 5–17)
BUN SERPL-MCNC: 7 MG/DL — SIGNIFICANT CHANGE UP (ref 7–23)
CALCIUM SERPL-MCNC: 8.6 MG/DL — SIGNIFICANT CHANGE UP (ref 8.4–10.5)
CHLORIDE SERPL-SCNC: 104 MMOL/L — SIGNIFICANT CHANGE UP (ref 96–108)
CO2 SERPL-SCNC: 23 MMOL/L — SIGNIFICANT CHANGE UP (ref 22–31)
CREAT SERPL-MCNC: 0.68 MG/DL — SIGNIFICANT CHANGE UP (ref 0.5–1.3)
EGFR: 102 ML/MIN/1.73M2 — SIGNIFICANT CHANGE UP
GLUCOSE SERPL-MCNC: 76 MG/DL — SIGNIFICANT CHANGE UP (ref 70–99)
HCT VFR BLD CALC: 26.1 % — LOW (ref 34.5–45)
HGB BLD-MCNC: 8.7 G/DL — LOW (ref 11.5–15.5)
MAGNESIUM SERPL-MCNC: 2.2 MG/DL — SIGNIFICANT CHANGE UP (ref 1.6–2.6)
MCHC RBC-ENTMCNC: 29.9 PG — SIGNIFICANT CHANGE UP (ref 27–34)
MCHC RBC-ENTMCNC: 33.3 GM/DL — SIGNIFICANT CHANGE UP (ref 32–36)
MCV RBC AUTO: 89.7 FL — SIGNIFICANT CHANGE UP (ref 80–100)
NRBC # BLD: 0 /100 WBCS — SIGNIFICANT CHANGE UP (ref 0–0)
PHOSPHATE SERPL-MCNC: 1.6 MG/DL — LOW (ref 2.5–4.5)
PLATELET # BLD AUTO: 191 K/UL — SIGNIFICANT CHANGE UP (ref 150–400)
POTASSIUM SERPL-MCNC: 3.9 MMOL/L — SIGNIFICANT CHANGE UP (ref 3.5–5.3)
POTASSIUM SERPL-SCNC: 3.9 MMOL/L — SIGNIFICANT CHANGE UP (ref 3.5–5.3)
RBC # BLD: 2.91 M/UL — LOW (ref 3.8–5.2)
RBC # FLD: 14.3 % — SIGNIFICANT CHANGE UP (ref 10.3–14.5)
SODIUM SERPL-SCNC: 139 MMOL/L — SIGNIFICANT CHANGE UP (ref 135–145)
WBC # BLD: 12.7 K/UL — HIGH (ref 3.8–10.5)
WBC # FLD AUTO: 12.7 K/UL — HIGH (ref 3.8–10.5)

## 2024-05-31 RX ORDER — ACETAMINOPHEN 500 MG
750 TABLET ORAL EVERY 6 HOURS
Refills: 0 | Status: COMPLETED | OUTPATIENT
Start: 2024-05-31 | End: 2024-05-31

## 2024-05-31 RX ORDER — POTASSIUM PHOSPHATE, MONOBASIC POTASSIUM PHOSPHATE, DIBASIC 236; 224 MG/ML; MG/ML
30 INJECTION, SOLUTION INTRAVENOUS ONCE
Refills: 0 | Status: COMPLETED | OUTPATIENT
Start: 2024-05-31 | End: 2024-05-31

## 2024-05-31 RX ADMIN — HYDROMORPHONE HYDROCHLORIDE 250 MILLILITER(S): 2 INJECTION INTRAMUSCULAR; INTRAVENOUS; SUBCUTANEOUS at 18:59

## 2024-05-31 RX ADMIN — ONDANSETRON 4 MILLIGRAM(S): 8 TABLET, FILM COATED ORAL at 09:00

## 2024-05-31 RX ADMIN — HEPARIN SODIUM 5000 UNIT(S): 5000 INJECTION INTRAVENOUS; SUBCUTANEOUS at 15:02

## 2024-05-31 RX ADMIN — Medication 750 MILLIGRAM(S): at 13:51

## 2024-05-31 RX ADMIN — POTASSIUM PHOSPHATE, MONOBASIC POTASSIUM PHOSPHATE, DIBASIC 83.33 MILLIMOLE(S): 236; 224 INJECTION, SOLUTION INTRAVENOUS at 15:02

## 2024-05-31 RX ADMIN — Medication 300 MILLIGRAM(S): at 18:50

## 2024-05-31 RX ADMIN — Medication 750 MILLIGRAM(S): at 05:48

## 2024-05-31 RX ADMIN — Medication 300 MILLIGRAM(S): at 05:18

## 2024-05-31 RX ADMIN — Medication 300 MILLIGRAM(S): at 13:21

## 2024-05-31 RX ADMIN — SODIUM CHLORIDE 100 MILLILITER(S): 9 INJECTION, SOLUTION INTRAVENOUS at 15:03

## 2024-05-31 RX ADMIN — Medication 300 MILLIGRAM(S): at 23:42

## 2024-05-31 RX ADMIN — HYDROMORPHONE HYDROCHLORIDE 250 MILLILITER(S): 2 INJECTION INTRAMUSCULAR; INTRAVENOUS; SUBCUTANEOUS at 07:15

## 2024-05-31 RX ADMIN — Medication 750 MILLIGRAM(S): at 19:23

## 2024-05-31 RX ADMIN — HEPARIN SODIUM 5000 UNIT(S): 5000 INJECTION INTRAVENOUS; SUBCUTANEOUS at 05:16

## 2024-05-31 RX ADMIN — HEPARIN SODIUM 5000 UNIT(S): 5000 INJECTION INTRAVENOUS; SUBCUTANEOUS at 21:22

## 2024-05-31 NOTE — PROGRESS NOTE ADULT - ASSESSMENT
58yr old female presents to [facility] for a scheduled Laparoscopic Small Bowel Resection Stricturoplasty and Laparoscopic Ileostomy Creation on 5/29/2024. PMH of Crohn's dx in the small bowel (dx'd in 2004 Dr. Betts) s/p lap SBR 2004 for a SBO. She's now s/p Laparoscopic Small Bowel Resection Stricturoplasty and Laparoscopic Ileostomy Creation on 5/29/2024. Recovering well.    Plan:  - NPO with sips/IVF  - PCEA  - D/c Spivey 5/31  - Ostomy teaching  - PT  - DVT ppx    Red Surgery  [id number]

## 2024-05-31 NOTE — PROGRESS NOTE ADULT - SUBJECTIVE AND OBJECTIVE BOX
Day __ of Anesthesia Pain Management Service    SUBJECTIVE: Patient doing well with PCEA    Pain Scale Score:   Refer to charted pain scores    THERAPY:  [X] Epidural Bupivacaine 0.0625% and Hydromorphone         [X] 10 micrograms/mL 	[ ] 5 micrograms/mL  [ ] Epidural Bupivacaine 0.0625% and Fentanyl 2 micrograms/mL  [ ] Epidural Ropivacaine 0.1% plain – 1 mg/mL    Demand dose: 3 mL  Lockout: 15 minutes  Continuous Rate: 6 mL/hr    MEDICATIONS  (STANDING):  acetaminophen   IVPB .. 750 milliGRAM(s) IV Intermittent every 6 hours  cefoTEtan  IVPB 2 Gram(s) IV Intermittent once  heparin   Injectable 5000 Unit(s) SubCutaneous every 8 hours  hydromorphone (10 MICROgram(s)/mL) + bupivacaine 0.0625% in 0.9% Sodium Chloride PCEA 250 milliLiter(s) Epidural PCA Continuous  lactated ringers. 1000 milliLiter(s) (100 mL/Hr) IV Continuous <Continuous>  potassium phosphate IVPB 30 milliMole(s) IV Intermittent once    MEDICATIONS  (PRN):  hydromorphone (10 MICROgram(s)/mL) + bupivacaine 0.0625% in 0.9% Sodium Chloride PCEA Rescue Clinician  Bolus 5 milliLiter(s) Epidural every 15 minutes PRN for Pain Score greater than 6  nalbuphine Injectable 2.5 milliGRAM(s) IV Push every 6 hours PRN Pruritus  naloxone Injectable 0.1 milliGRAM(s) IV Push every 3 minutes PRN For ANY of the following changes in patient status:  A. RR LESS THAN 10 breaths per minute, B. Oxygen saturation LESS THAN 90%, C. Sedation score of 6  ondansetron Injectable 4 milliGRAM(s) IV Push every 6 hours PRN Nausea      OBJECTIVE:    Assessment of Catheter Site:    [X] Epidural 	  [X] Dressing intact	[X] Site non-tender	[X] Site without erythema, discharge, edema  [X] Epidural tubing and connection checked	[X] Gross neurological exam within normal limits  [ ] Catheter removed – tip intact		[X] Afebrile	            [ ] Febrile: ___                          8.7    12.70 )-----------( 191      ( 31 May 2024 07:34 )             26.1     Vital Signs Last 24 Hrs  T(C): 36.7 (05-31-24 @ 09:09), Max: 36.9 (05-31-24 @ 05:02)  T(F): 98 (05-31-24 @ 09:09), Max: 98.4 (05-31-24 @ 05:02)  HR: 98 (05-31-24 @ 09:09) (81 - 99)  BP: 144/82 (05-31-24 @ 09:09) (98/61 - 144/82)  BP(mean): --  RR: 16 (05-31-24 @ 09:09) (16 - 17)  SpO2: 99% (05-31-24 @ 09:09) (99% - 100%)      Sedation Score:	[X] Alert	[ ] Drowsy	[ ] Arousable  [ ] Asleep     [ ] Unresponsive    Side Effects:	[X] None	[ ] Nausea	[ ] Vomiting   [ ] Pruritus  		[ ] Weakness     [ ] Numbness	[ ] Other:    ASSESSMENT/ PLAN:    Therapy:	[X] Continue   [ ] Discontinue   [ ] Change to PRN Analgesics   [ ] Change to PCA    Documentation and Verification of current medications:  [X] Done	[ ] Not done, not eligible, reason:

## 2024-05-31 NOTE — ADVANCED PRACTICE NURSE CONSULT - ASSESSMENT
In at bedside to f/u & continue ostomy teaching. Pt OOB in chair and receptive to learning. Nasal cannula in place. Complete pouching system changed. Loop ileostomy 2" pink & viable. White yolanda removed. Peristomal skin is intact. Mucocutaneous junction intact. Re-pouched w/ 2 1/4" flat skin barrier, bead of stoma paste applied at the back of skin barrier to caulk, used drainable pouch. Pt observed as reviewed steps for change. Participated with closing of drainable pouch. Reviewed ileostomy dietary modifications (including low fiber, importance of chewing foods well & importance of hydration). Pt expresses understanding & receptive to teaching. Staff to reinforce teaching. Supplies , pattern left at bedside.  Reviewed educational material and also left @ bedside. Emotional support provided.

## 2024-05-31 NOTE — PROGRESS NOTE ADULT - SUBJECTIVE AND OBJECTIVE BOX
SURGERY DAILY PROGRESS NOTE:     SUBJECTIVE/ROS: Patient seen and evaluated on AM rounds. Reports she felt dizzy after standing up yesterday. Pain is controlled. Denies nausea, vomiting, chest pain, shortness of breath       OBJECTIVE:  Vital Signs Last 24 Hrs  T(C): 36.9 (31 May 2024 05:02), Max: 37.1 (30 May 2024 09:13)  T(F): 98.4 (31 May 2024 05:02), Max: 98.7 (30 May 2024 09:13)  HR: 99 (31 May 2024 05:02) (81 - 99)  BP: 119/74 (31 May 2024 05:02) (98/61 - 123/72)  BP(mean): --  RR: 17 (31 May 2024 05:02) (16 - 17)  SpO2: 99% (31 May 2024 05:02) (99% - 100%)    Parameters below as of 31 May 2024 05:02  Patient On (Oxygen Delivery Method): nasal cannula  O2 Flow (L/min): 2    I&O's Detail    30 May 2024 07:01  -  31 May 2024 07:00  --------------------------------------------------------  IN:    IV PiggyBack: 150 mL    Lactated Ringers: 2100 mL  Total IN: 2250 mL    OUT:    Ileostomy (mL): 175 mL    Indwelling Catheter - Urethral (mL): 1900 mL    Oral Fluid: 0 mL  Total OUT: 2075 mL    Total NET: 175 mL        Daily     Daily   MEDICATIONS  (STANDING):  acetaminophen   IVPB .. 750 milliGRAM(s) IV Intermittent every 6 hours  cefoTEtan  IVPB 2 Gram(s) IV Intermittent once  heparin   Injectable 5000 Unit(s) SubCutaneous every 8 hours  hydromorphone (10 MICROgram(s)/mL) + bupivacaine 0.0625% in 0.9% Sodium Chloride PCEA 250 milliLiter(s) Epidural PCA Continuous  lactated ringers. 1000 milliLiter(s) (100 mL/Hr) IV Continuous <Continuous>    MEDICATIONS  (PRN):  hydromorphone (10 MICROgram(s)/mL) + bupivacaine 0.0625% in 0.9% Sodium Chloride PCEA Rescue Clinician  Bolus 5 milliLiter(s) Epidural every 15 minutes PRN for Pain Score greater than 6  nalbuphine Injectable 2.5 milliGRAM(s) IV Push every 6 hours PRN Pruritus  naloxone Injectable 0.1 milliGRAM(s) IV Push every 3 minutes PRN For ANY of the following changes in patient status:  A. RR LESS THAN 10 breaths per minute, B. Oxygen saturation LESS THAN 90%, C. Sedation score of 6  ondansetron Injectable 4 milliGRAM(s) IV Push every 6 hours PRN Nausea      LABS:                        8.7    12.70 )-----------( 191      ( 31 May 2024 07:34 )             26.1     05-30    140  |  107  |  6<L>  ----------------------------<  137<H>  4.4   |  21<L>  |  0.68    Ca    7.9<L>      30 May 2024 07:07  Phos  2.6     05-30  Mg     2.0     05-30        General Appearance: Appears well, NAD  Neck: Supple  Chest: Equal expansion bilaterally  CV: Pulse regular presently  Abdomen: Soft, nontense, appropriate incisional tenderness, dressings clean and dry and intact  Extremities: [facility]

## 2024-06-01 LAB
ANION GAP SERPL CALC-SCNC: 16 MMOL/L — SIGNIFICANT CHANGE UP (ref 5–17)
BUN SERPL-MCNC: 6 MG/DL — LOW (ref 7–23)
CALCIUM SERPL-MCNC: 9 MG/DL — SIGNIFICANT CHANGE UP (ref 8.4–10.5)
CHLORIDE SERPL-SCNC: 106 MMOL/L — SIGNIFICANT CHANGE UP (ref 96–108)
CO2 SERPL-SCNC: 19 MMOL/L — LOW (ref 22–31)
CREAT SERPL-MCNC: 0.63 MG/DL — SIGNIFICANT CHANGE UP (ref 0.5–1.3)
EGFR: 104 ML/MIN/1.73M2 — SIGNIFICANT CHANGE UP
GLUCOSE SERPL-MCNC: 79 MG/DL — SIGNIFICANT CHANGE UP (ref 70–99)
HCT VFR BLD CALC: 24.2 % — LOW (ref 34.5–45)
HGB BLD-MCNC: 8.1 G/DL — LOW (ref 11.5–15.5)
MAGNESIUM SERPL-MCNC: 2.1 MG/DL — SIGNIFICANT CHANGE UP (ref 1.6–2.6)
MCHC RBC-ENTMCNC: 30.2 PG — SIGNIFICANT CHANGE UP (ref 27–34)
MCHC RBC-ENTMCNC: 33.5 GM/DL — SIGNIFICANT CHANGE UP (ref 32–36)
MCV RBC AUTO: 90.3 FL — SIGNIFICANT CHANGE UP (ref 80–100)
NRBC # BLD: 0 /100 WBCS — SIGNIFICANT CHANGE UP (ref 0–0)
PHOSPHATE SERPL-MCNC: 1.6 MG/DL — LOW (ref 2.5–4.5)
PLATELET # BLD AUTO: 207 K/UL — SIGNIFICANT CHANGE UP (ref 150–400)
POTASSIUM SERPL-MCNC: 4.1 MMOL/L — SIGNIFICANT CHANGE UP (ref 3.5–5.3)
POTASSIUM SERPL-SCNC: 4.1 MMOL/L — SIGNIFICANT CHANGE UP (ref 3.5–5.3)
RBC # BLD: 2.68 M/UL — LOW (ref 3.8–5.2)
RBC # FLD: 14.3 % — SIGNIFICANT CHANGE UP (ref 10.3–14.5)
SODIUM SERPL-SCNC: 141 MMOL/L — SIGNIFICANT CHANGE UP (ref 135–145)
WBC # BLD: 9.6 K/UL — SIGNIFICANT CHANGE UP (ref 3.8–10.5)
WBC # FLD AUTO: 9.6 K/UL — SIGNIFICANT CHANGE UP (ref 3.8–10.5)

## 2024-06-01 RX ORDER — SODIUM CHLORIDE 9 MG/ML
500 INJECTION, SOLUTION INTRAVENOUS ONCE
Refills: 0 | Status: COMPLETED | OUTPATIENT
Start: 2024-06-01 | End: 2024-06-01

## 2024-06-01 RX ORDER — POTASSIUM PHOSPHATE, MONOBASIC POTASSIUM PHOSPHATE, DIBASIC 236; 224 MG/ML; MG/ML
30 INJECTION, SOLUTION INTRAVENOUS ONCE
Refills: 0 | Status: COMPLETED | OUTPATIENT
Start: 2024-06-01 | End: 2024-06-01

## 2024-06-01 RX ORDER — ACETAMINOPHEN 500 MG
1000 TABLET ORAL EVERY 6 HOURS
Refills: 0 | Status: DISCONTINUED | OUTPATIENT
Start: 2024-06-01 | End: 2024-06-01

## 2024-06-01 RX ORDER — ACETAMINOPHEN 500 MG
650 TABLET ORAL EVERY 6 HOURS
Refills: 0 | Status: DISCONTINUED | OUTPATIENT
Start: 2024-06-01 | End: 2024-06-01

## 2024-06-01 RX ORDER — ACETAMINOPHEN 500 MG
750 TABLET ORAL EVERY 6 HOURS
Refills: 0 | Status: COMPLETED | OUTPATIENT
Start: 2024-06-01 | End: 2024-06-01

## 2024-06-01 RX ADMIN — Medication 750 MILLIGRAM(S): at 18:15

## 2024-06-01 RX ADMIN — ONDANSETRON 4 MILLIGRAM(S): 8 TABLET, FILM COATED ORAL at 23:00

## 2024-06-01 RX ADMIN — Medication 750 MILLIGRAM(S): at 12:40

## 2024-06-01 RX ADMIN — SODIUM CHLORIDE 1000 MILLILITER(S): 9 INJECTION, SOLUTION INTRAVENOUS at 05:58

## 2024-06-01 RX ADMIN — Medication 300 MILLIGRAM(S): at 17:49

## 2024-06-01 RX ADMIN — HEPARIN SODIUM 5000 UNIT(S): 5000 INJECTION INTRAVENOUS; SUBCUTANEOUS at 12:13

## 2024-06-01 RX ADMIN — SODIUM CHLORIDE 1000 MILLILITER(S): 9 INJECTION, SOLUTION INTRAVENOUS at 10:50

## 2024-06-01 RX ADMIN — POTASSIUM PHOSPHATE, MONOBASIC POTASSIUM PHOSPHATE, DIBASIC 83.33 MILLIMOLE(S): 236; 224 INJECTION, SOLUTION INTRAVENOUS at 12:16

## 2024-06-01 RX ADMIN — HEPARIN SODIUM 5000 UNIT(S): 5000 INJECTION INTRAVENOUS; SUBCUTANEOUS at 05:13

## 2024-06-01 RX ADMIN — HYDROMORPHONE HYDROCHLORIDE 250 MILLILITER(S): 2 INJECTION INTRAMUSCULAR; INTRAVENOUS; SUBCUTANEOUS at 07:24

## 2024-06-01 RX ADMIN — Medication 300 MILLIGRAM(S): at 12:16

## 2024-06-01 RX ADMIN — Medication 300 MILLIGRAM(S): at 22:47

## 2024-06-01 RX ADMIN — Medication 750 MILLIGRAM(S): at 23:17

## 2024-06-01 RX ADMIN — HYDROMORPHONE HYDROCHLORIDE 250 MILLILITER(S): 2 INJECTION INTRAMUSCULAR; INTRAVENOUS; SUBCUTANEOUS at 19:00

## 2024-06-01 RX ADMIN — Medication 300 MILLIGRAM(S): at 06:26

## 2024-06-01 RX ADMIN — HEPARIN SODIUM 5000 UNIT(S): 5000 INJECTION INTRAVENOUS; SUBCUTANEOUS at 22:47

## 2024-06-01 RX ADMIN — Medication 750 MILLIGRAM(S): at 00:12

## 2024-06-01 NOTE — PROGRESS NOTE ADULT - SUBJECTIVE AND OBJECTIVE BOX
SURGERY DAILY PROGRESS NOTE:     SUBJECTIVE/ROS: Patient seen and evaluated on AM rounds. Reports feeling weak. Tolerating CLD. Ostomy with high output, received 500 cc bolus overnight. Denies nausea, vomiting, chest pain, shortness of breath     OBJECTIVE:  Vital Signs Last 24 Hrs  T(C): 36.7 (01 Jun 2024 08:45), Max: 37.3 (01 Jun 2024 00:35)  T(F): 98.1 (01 Jun 2024 08:45), Max: 99.1 (01 Jun 2024 00:35)  HR: 91 (01 Jun 2024 08:45) (80 - 100)  BP: 120/80 (01 Jun 2024 08:45) (113/73 - 130/73)  BP(mean): --  RR: 18 (01 Jun 2024 08:45) (16 - 18)  SpO2: 97% (01 Jun 2024 08:45) (94% - 100%)    Parameters below as of 01 Jun 2024 08:45  Patient On (Oxygen Delivery Method): room air      I&O's Detail    31 May 2024 07:01  -  01 Jun 2024 07:00  --------------------------------------------------------  IN:    IV PiggyBack: 650 mL    Lactated Ringers: 2300 mL  Total IN: 2950 mL    OUT:    Ileostomy (mL): 2575 mL    Indwelling Catheter - Urethral (mL): 500 mL    Voided (mL): 1300 mL  Total OUT: 4375 mL    Total NET: -1425 mL      01 Jun 2024 07:01  -  01 Jun 2024 09:37  --------------------------------------------------------  IN:    Oral Fluid: 180 mL  Total IN: 180 mL    OUT:    Ileostomy (mL): 100 mL    Voided (mL): 800 mL  Total OUT: 900 mL    Total NET: -720 mL        Daily     Daily   MEDICATIONS  (STANDING):  acetaminophen   IVPB .. 750 milliGRAM(s) IV Intermittent every 6 hours  heparin   Injectable 5000 Unit(s) SubCutaneous every 8 hours  hydromorphone (10 MICROgram(s)/mL) + bupivacaine 0.0625% in 0.9% Sodium Chloride PCEA 250 milliLiter(s) Epidural PCA Continuous  lactated ringers Bolus 500 milliLiter(s) IV Bolus once  lactated ringers. 1000 milliLiter(s) (100 mL/Hr) IV Continuous <Continuous>  potassium phosphate IVPB 30 milliMole(s) IV Intermittent once    MEDICATIONS  (PRN):  hydromorphone (10 MICROgram(s)/mL) + bupivacaine 0.0625% in 0.9% Sodium Chloride PCEA Rescue Clinician  Bolus 5 milliLiter(s) Epidural every 15 minutes PRN for Pain Score greater than 6  nalbuphine Injectable 2.5 milliGRAM(s) IV Push every 6 hours PRN Pruritus  naloxone Injectable 0.1 milliGRAM(s) IV Push every 3 minutes PRN For ANY of the following changes in patient status:  A. RR LESS THAN 10 breaths per minute, B. Oxygen saturation LESS THAN 90%, C. Sedation score of 6  ondansetron Injectable 4 milliGRAM(s) IV Push every 6 hours PRN Nausea      LABS:                        8.1    9.60  )-----------( 207      ( 01 Jun 2024 07:07 )             24.2     06-01    141  |  106  |  6<L>  ----------------------------<  79  4.1   |  19<L>  |  0.63    Ca    9.0      01 Jun 2024 07:07  Phos  1.6     06-01  Mg     2.1     06-01      PHYSICAL EXAM:  General Appearance: Appears well, NAD  Neck: Supple  Chest: Equal expansion bilaterally  CV: Pulse regular presently  Abdomen: Soft, nontense, appropriate incisional tenderness, dressings clean and dry and intact  Extremities: [facility]

## 2024-06-01 NOTE — PROGRESS NOTE ADULT - SUBJECTIVE AND OBJECTIVE BOX
SUBJECTIVE: Patient doing well with PCEA    Pain Scale Score:   Refer to charted pain scores    THERAPY:  [X] Epidural Bupivacaine 0.0625% and Hydromorphone         [X] 10 micrograms/mL 	[ ] 5 micrograms/mL  [ ] Epidural Bupivacaine 0.0625% and Fentanyl 2 micrograms/mL  [ ] Epidural Ropivacaine 0.1% plain – 1 mg/mL    Demand dose: 3 mL  Lockout: 15 minutes  Continuous Rate: 2 mL/hr    MEDICATIONS  (STANDING):  acetaminophen   IVPB .. 750 milliGRAM(s) IV Intermittent every 6 hours  heparin   Injectable 5000 Unit(s) SubCutaneous every 8 hours  hydromorphone (10 MICROgram(s)/mL) + bupivacaine 0.0625% in 0.9% Sodium Chloride PCEA 250 milliLiter(s) Epidural PCA Continuous  lactated ringers. 1000 milliLiter(s) (100 mL/Hr) IV Continuous <Continuous>  potassium phosphate IVPB 30 milliMole(s) IV Intermittent once    MEDICATIONS  (PRN):  hydromorphone (10 MICROgram(s)/mL) + bupivacaine 0.0625% in 0.9% Sodium Chloride PCEA Rescue Clinician  Bolus 5 milliLiter(s) Epidural every 15 minutes PRN for Pain Score greater than 6  nalbuphine Injectable 2.5 milliGRAM(s) IV Push every 6 hours PRN Pruritus  naloxone Injectable 0.1 milliGRAM(s) IV Push every 3 minutes PRN For ANY of the following changes in patient status:  A. RR LESS THAN 10 breaths per minute, B. Oxygen saturation LESS THAN 90%, C. Sedation score of 6  ondansetron Injectable 4 milliGRAM(s) IV Push every 6 hours PRN Nausea      OBJECTIVE:    Assessment of Catheter Site:    [X] Epidural 	  [X] Dressing intact	[X] Site non-tender	[X] Site without erythema, discharge, edema  [X] Epidural tubing and connection checked	[X] Gross neurological exam within normal limits  [ ] Catheter removed – tip intact		[X] Afebrile	            [ ] Febrile: ___                          8.1    9.60  )-----------( 207      ( 01 Jun 2024 07:07 )             24.2     Vital Signs Last 24 Hrs  T(C): 36.7 (06-01-24 @ 08:45), Max: 37.3 (06-01-24 @ 00:35)  T(F): 98.1 (06-01-24 @ 08:45), Max: 99.1 (06-01-24 @ 00:35)  HR: 91 (06-01-24 @ 08:45) (80 - 100)  BP: 120/80 (06-01-24 @ 08:45) (113/73 - 130/73)  BP(mean): --  RR: 18 (06-01-24 @ 08:45) (16 - 18)  SpO2: 97% (06-01-24 @ 08:45) (94% - 100%)      Sedation Score:	[X] Alert	[ ] Drowsy	[ ] Arousable  [ ] Asleep     [ ] Unresponsive    Side Effects:	[X] None	[ ] Nausea	[ ] Vomiting   [ ] Pruritus  		[ ] Weakness     [ ] Numbness	[ ] Other:    ASSESSMENT/ PLAN:    Therapy:	[X] Continue   [ ] Discontinue   [ ] Change to PRN Analgesics   [ ] Change to PCA    Documentation and Verification of current medications:  [X] Done	[ ] Not done, not eligible, reason: SUBJECTIVE: Patient doing well with PCEA    Pain Scale Score:   Refer to charted pain scores    THERAPY:  [X] Epidural Bupivacaine 0.0625% and Hydromorphone         [X] 10 micrograms/mL 	[ ] 5 micrograms/mL  [ ] Epidural Bupivacaine 0.0625% and Fentanyl 2 micrograms/mL  [ ] Epidural Ropivacaine 0.1% plain – 1 mg/mL    Demand dose: 3 mL  Lockout: 15 minutes  Continuous Rate: 4 mL/hr    MEDICATIONS  (STANDING):  acetaminophen   IVPB .. 750 milliGRAM(s) IV Intermittent every 6 hours  heparin   Injectable 5000 Unit(s) SubCutaneous every 8 hours  hydromorphone (10 MICROgram(s)/mL) + bupivacaine 0.0625% in 0.9% Sodium Chloride PCEA 250 milliLiter(s) Epidural PCA Continuous  lactated ringers. 1000 milliLiter(s) (100 mL/Hr) IV Continuous <Continuous>  potassium phosphate IVPB 30 milliMole(s) IV Intermittent once    MEDICATIONS  (PRN):  hydromorphone (10 MICROgram(s)/mL) + bupivacaine 0.0625% in 0.9% Sodium Chloride PCEA Rescue Clinician  Bolus 5 milliLiter(s) Epidural every 15 minutes PRN for Pain Score greater than 6  nalbuphine Injectable 2.5 milliGRAM(s) IV Push every 6 hours PRN Pruritus  naloxone Injectable 0.1 milliGRAM(s) IV Push every 3 minutes PRN For ANY of the following changes in patient status:  A. RR LESS THAN 10 breaths per minute, B. Oxygen saturation LESS THAN 90%, C. Sedation score of 6  ondansetron Injectable 4 milliGRAM(s) IV Push every 6 hours PRN Nausea      OBJECTIVE:    Assessment of Catheter Site:    [X] Epidural 	  [X] Dressing intact	[X] Site non-tender	[X] Site without erythema, discharge, edema  [X] Epidural tubing and connection checked	[X] Gross neurological exam within normal limits  [ ] Catheter removed – tip intact		[X] Afebrile	            [ ] Febrile: ___                          8.1    9.60  )-----------( 207      ( 01 Jun 2024 07:07 )             24.2     Vital Signs Last 24 Hrs  T(C): 36.7 (06-01-24 @ 08:45), Max: 37.3 (06-01-24 @ 00:35)  T(F): 98.1 (06-01-24 @ 08:45), Max: 99.1 (06-01-24 @ 00:35)  HR: 91 (06-01-24 @ 08:45) (80 - 100)  BP: 120/80 (06-01-24 @ 08:45) (113/73 - 130/73)  BP(mean): --  RR: 18 (06-01-24 @ 08:45) (16 - 18)  SpO2: 97% (06-01-24 @ 08:45) (94% - 100%)      Sedation Score:	[X] Alert	[ ] Drowsy	[ ] Arousable  [ ] Asleep     [ ] Unresponsive    Side Effects:	[X] None	[ ] Nausea	[ ] Vomiting   [ ] Pruritus  		[ ] Weakness     [ ] Numbness	[ ] Other:    ASSESSMENT/ PLAN:    Therapy:	[X] Continue   [ ] Discontinue   [ ] Change to PRN Analgesics   [ ] Change to PCA    Documentation and Verification of current medications:  [X] Done	[ ] Not done, not eligible, reason:

## 2024-06-01 NOTE — PROGRESS NOTE ADULT - ASSESSMENT
58yr old female presents to [facility] for a scheduled Laparoscopic Small Bowel Resection Stricturoplasty and Laparoscopic Ileostomy Creation on 5/29/2024. PMH of Crohn's dx in the small bowel (dx'd in 2004 Dr. Betts) s/p lap SBR 2004 for a SBO. She's now s/p Laparoscopic Small Bowel Resection Stricturoplasty and Laparoscopic Ileostomy Creation on 5/29/2024. Recovering well. Spivey removed 5/31, passed TOV.     Plan:  - Advance to FLD/IVF  - Additional 500 cc bolus for ostomy output  - Continue PCEA  - Ostomy teaching  - PT  - DVT ppx    Red Surgery  [id number]

## 2024-06-02 LAB
ANION GAP SERPL CALC-SCNC: 16 MMOL/L — SIGNIFICANT CHANGE UP (ref 5–17)
APTT BLD: 30.1 SEC — SIGNIFICANT CHANGE UP (ref 24.5–35.6)
BUN SERPL-MCNC: 5 MG/DL — LOW (ref 7–23)
CALCIUM SERPL-MCNC: 9.3 MG/DL — SIGNIFICANT CHANGE UP (ref 8.4–10.5)
CHLORIDE SERPL-SCNC: 102 MMOL/L — SIGNIFICANT CHANGE UP (ref 96–108)
CO2 SERPL-SCNC: 22 MMOL/L — SIGNIFICANT CHANGE UP (ref 22–31)
CREAT SERPL-MCNC: 0.5 MG/DL — SIGNIFICANT CHANGE UP (ref 0.5–1.3)
EGFR: 110 ML/MIN/1.73M2 — SIGNIFICANT CHANGE UP
GLUCOSE SERPL-MCNC: 92 MG/DL — SIGNIFICANT CHANGE UP (ref 70–99)
HCT VFR BLD CALC: 24.9 % — LOW (ref 34.5–45)
HGB BLD-MCNC: 8.4 G/DL — LOW (ref 11.5–15.5)
INR BLD: 0.95 RATIO — SIGNIFICANT CHANGE UP (ref 0.85–1.18)
MAGNESIUM SERPL-MCNC: 1.8 MG/DL — SIGNIFICANT CHANGE UP (ref 1.6–2.6)
MCHC RBC-ENTMCNC: 29.9 PG — SIGNIFICANT CHANGE UP (ref 27–34)
MCHC RBC-ENTMCNC: 33.7 GM/DL — SIGNIFICANT CHANGE UP (ref 32–36)
MCV RBC AUTO: 88.6 FL — SIGNIFICANT CHANGE UP (ref 80–100)
NRBC # BLD: 0 /100 WBCS — SIGNIFICANT CHANGE UP (ref 0–0)
PHOSPHATE SERPL-MCNC: 1.9 MG/DL — LOW (ref 2.5–4.5)
PLATELET # BLD AUTO: 234 K/UL — SIGNIFICANT CHANGE UP (ref 150–400)
POTASSIUM SERPL-MCNC: 4.1 MMOL/L — SIGNIFICANT CHANGE UP (ref 3.5–5.3)
POTASSIUM SERPL-SCNC: 4.1 MMOL/L — SIGNIFICANT CHANGE UP (ref 3.5–5.3)
PROTHROM AB SERPL-ACNC: 10 SEC — SIGNIFICANT CHANGE UP (ref 9.5–13)
RBC # BLD: 2.81 M/UL — LOW (ref 3.8–5.2)
RBC # FLD: 13.8 % — SIGNIFICANT CHANGE UP (ref 10.3–14.5)
SODIUM SERPL-SCNC: 140 MMOL/L — SIGNIFICANT CHANGE UP (ref 135–145)
WBC # BLD: 7.14 K/UL — SIGNIFICANT CHANGE UP (ref 3.8–10.5)
WBC # FLD AUTO: 7.14 K/UL — SIGNIFICANT CHANGE UP (ref 3.8–10.5)

## 2024-06-02 RX ORDER — HEPARIN SODIUM 5000 [USP'U]/ML
5000 INJECTION INTRAVENOUS; SUBCUTANEOUS EVERY 8 HOURS
Refills: 0 | Status: DISCONTINUED | OUTPATIENT
Start: 2024-06-03 | End: 2024-06-10

## 2024-06-02 RX ORDER — OXYCODONE HYDROCHLORIDE 5 MG/1
5 TABLET ORAL EVERY 6 HOURS
Refills: 0 | Status: DISCONTINUED | OUTPATIENT
Start: 2024-06-02 | End: 2024-06-02

## 2024-06-02 RX ORDER — IBUPROFEN 200 MG
400 TABLET ORAL EVERY 6 HOURS
Refills: 0 | Status: DISCONTINUED | OUTPATIENT
Start: 2024-06-02 | End: 2024-06-10

## 2024-06-02 RX ORDER — OXYCODONE HYDROCHLORIDE 5 MG/1
10 TABLET ORAL EVERY 6 HOURS
Refills: 0 | Status: DISCONTINUED | OUTPATIENT
Start: 2024-06-02 | End: 2024-06-02

## 2024-06-02 RX ORDER — ACETAMINOPHEN 500 MG
1000 TABLET ORAL EVERY 6 HOURS
Refills: 0 | Status: DISCONTINUED | OUTPATIENT
Start: 2024-06-02 | End: 2024-06-02

## 2024-06-02 RX ORDER — ACETAMINOPHEN 500 MG
650 TABLET ORAL EVERY 6 HOURS
Refills: 0 | Status: DISCONTINUED | OUTPATIENT
Start: 2024-06-03 | End: 2024-06-10

## 2024-06-02 RX ORDER — ACETAMINOPHEN 500 MG
650 TABLET ORAL EVERY 6 HOURS
Refills: 0 | Status: COMPLETED | OUTPATIENT
Start: 2024-06-02 | End: 2024-06-02

## 2024-06-02 RX ADMIN — HEPARIN SODIUM 5000 UNIT(S): 5000 INJECTION INTRAVENOUS; SUBCUTANEOUS at 05:07

## 2024-06-02 RX ADMIN — Medication 400 MILLIGRAM(S): at 15:19

## 2024-06-02 RX ADMIN — Medication 260 MILLIGRAM(S): at 11:16

## 2024-06-02 RX ADMIN — Medication 650 MILLIGRAM(S): at 11:40

## 2024-06-02 RX ADMIN — Medication 400 MILLIGRAM(S): at 21:32

## 2024-06-02 RX ADMIN — Medication 400 MILLIGRAM(S): at 16:10

## 2024-06-02 RX ADMIN — Medication 260 MILLIGRAM(S): at 06:41

## 2024-06-02 RX ADMIN — HYDROMORPHONE HYDROCHLORIDE 250 MILLILITER(S): 2 INJECTION INTRAMUSCULAR; INTRAVENOUS; SUBCUTANEOUS at 04:45

## 2024-06-02 RX ADMIN — Medication 260 MILLIGRAM(S): at 23:16

## 2024-06-02 RX ADMIN — HYDROMORPHONE HYDROCHLORIDE 250 MILLILITER(S): 2 INJECTION INTRAMUSCULAR; INTRAVENOUS; SUBCUTANEOUS at 06:54

## 2024-06-02 RX ADMIN — Medication 650 MILLIGRAM(S): at 23:46

## 2024-06-02 RX ADMIN — Medication 400 MILLIGRAM(S): at 21:02

## 2024-06-02 RX ADMIN — Medication 260 MILLIGRAM(S): at 17:02

## 2024-06-02 RX ADMIN — Medication 170 MILLIMOLE(S): at 11:16

## 2024-06-02 NOTE — PROGRESS NOTE ADULT - SUBJECTIVE AND OBJECTIVE BOX
Day 4 of Anesthesia Pain Management Service    SUBJECTIVE: Patient doing well with PCEA    Pain Scale Score:   Refer to charted pain scores    THERAPY:  [X] Epidural Bupivacaine 0.0625% and Hydromorphone         [X] 10 micrograms/mL 	[ ] 5 micrograms/mL  [ ] Epidural Bupivacaine 0.0625% and Fentanyl 2 micrograms/mL  [ ] Epidural Ropivacaine 0.1% plain – 1 mg/mL    Demand dose: 3 mL  Lockout: 15 minutes  Continuous Rate: 2 mL/hr    MEDICATIONS  (STANDING):  acetaminophen   IVPB .. 650 milliGRAM(s) IV Intermittent every 6 hours  hydromorphone (10 MICROgram(s)/mL) + bupivacaine 0.0625% in 0.9% Sodium Chloride PCEA 250 milliLiter(s) Epidural PCA Continuous  lactated ringers. 1000 milliLiter(s) (100 mL/Hr) IV Continuous <Continuous>    MEDICATIONS  (PRN):  hydromorphone (10 MICROgram(s)/mL) + bupivacaine 0.0625% in 0.9% Sodium Chloride PCEA Rescue Clinician  Bolus 5 milliLiter(s) Epidural every 15 minutes PRN for Pain Score greater than 6  nalbuphine Injectable 2.5 milliGRAM(s) IV Push every 6 hours PRN Pruritus  naloxone Injectable 0.1 milliGRAM(s) IV Push every 3 minutes PRN For ANY of the following changes in patient status:  A. RR LESS THAN 10 breaths per minute, B. Oxygen saturation LESS THAN 90%, C. Sedation score of 6  ondansetron Injectable 4 milliGRAM(s) IV Push every 6 hours PRN Nausea      OBJECTIVE:    Assessment of Catheter Site:    [X] Epidural 	  [X] Dressing intact	[X] Site non-tender	[X] Site without erythema, discharge, edema  [X] Epidural tubing and connection checked	[X] Gross neurological exam within normal limits  [ ] Catheter removed – tip intact		[X] Afebrile	            [ ] Febrile: ___    PT/INR - ( 02 Jun 2024 09:47 )   PT: 10.0 sec;   INR: 0.95 ratio         PTT - ( 02 Jun 2024 09:47 )  PTT:30.1 sec                      8.4    7.14  )-----------( 234      ( 02 Jun 2024 07:39 )             24.9     Vital Signs Last 24 Hrs  T(C): 36.5 (06-02-24 @ 09:00), Max: 37.2 (06-02-24 @ 04:22)  T(F): 97.7 (06-02-24 @ 09:00), Max: 98.9 (06-02-24 @ 04:22)  HR: 76 (06-02-24 @ 09:00) (76 - 100)  BP: 143/89 (06-02-24 @ 09:00) (123/82 - 143/89)  BP(mean): --  RR: 18 (06-02-24 @ 09:00) (18 - 18)  SpO2: 97% (06-02-24 @ 09:00) (95% - 98%)      Sedation Score:	[X] Alert	[ ] Drowsy	[ ] Arousable  [ ] Asleep     [ ] Unresponsive    Side Effects:	[X] None	[ ] Nausea	[ ] Vomiting   [ ] Pruritus  		[ ] Weakness     [ ] Numbness	[ ] Other:    ASSESSMENT/ PLAN:    Therapy:	[ ] Continue   [X] Discontinue   [X] Change to PRN Analgesics   [ ] Change to PCA    Documentation and Verification of current medications:  [X] Done	[ ] Not done, not eligible, reason:    Minimal use overnight- will transition to PO meds later today, pt aware and in agreement with plan

## 2024-06-02 NOTE — PROGRESS NOTE ADULT - SUBJECTIVE AND OBJECTIVE BOX
SUBJECTIVE: Patient seen and examined on AM rounds. Patient reports that they're feeling well. Denies fever, chills. Reports pain as controlled. No complaints at this time.     Vital Signs Last 24 Hrs  T(C): 36.5 (02 Jun 2024 09:00), Max: 37.2 (02 Jun 2024 04:22)  T(F): 97.7 (02 Jun 2024 09:00), Max: 98.9 (02 Jun 2024 04:22)  HR: 76 (02 Jun 2024 09:00) (76 - 100)  BP: 143/89 (02 Jun 2024 09:00) (123/82 - 143/89)  BP(mean): --  RR: 18 (02 Jun 2024 09:00) (18 - 18)  SpO2: 97% (02 Jun 2024 09:00) (95% - 98%)    Parameters below as of 02 Jun 2024 09:00  Patient On (Oxygen Delivery Method): room air        General Appearance: Appears well, NAD  Neck: Supple  Chest: Equal expansion bilaterally  CV: Pulse regular presently  Abdomen: Soft, nontense, appropriate incisional tenderness, dressings clean and dry and intact; ostomy w output  Extremities: WWP    I&O's Summary    01 Jun 2024 07:01  -  02 Jun 2024 07:00  --------------------------------------------------------  IN: 4000 mL / OUT: 5450 mL / NET: -1450 mL    02 Jun 2024 07:01  -  02 Jun 2024 11:01  --------------------------------------------------------  IN: 240 mL / OUT: 600 mL / NET: -360 mL      I&O's Detail    01 Jun 2024 07:01  -  02 Jun 2024 07:00  --------------------------------------------------------  IN:    IV PiggyBack: 500 mL    IV PiggyBack: 150 mL    Lactated Ringers: 2400 mL    Lactated Ringers Bolus: 500 mL    Oral Fluid: 450 mL  Total IN: 4000 mL    OUT:    Ileostomy (mL): 1950 mL    Voided (mL): 3500 mL  Total OUT: 5450 mL    Total NET: -1450 mL      02 Jun 2024 07:01  -  02 Jun 2024 11:01  --------------------------------------------------------  IN:    Oral Fluid: 240 mL  Total IN: 240 mL    OUT:    Voided (mL): 600 mL  Total OUT: 600 mL    Total NET: -360 mL          LABS:                        8.4    7.14  )-----------( 234      ( 02 Jun 2024 07:39 )             24.9     06-02    140  |  102  |  5<L>  ----------------------------<  92  4.1   |  22  |  0.50    Ca    9.3      02 Jun 2024 07:39  Phos  1.9     06-02  Mg     1.8     06-02      PT/INR - ( 02 Jun 2024 09:47 )   PT: 10.0 sec;   INR: 0.95 ratio         PTT - ( 02 Jun 2024 09:47 )  PTT:30.1 sec  Urinalysis Basic - ( 02 Jun 2024 07:39 )    Color: x / Appearance: x / SG: x / pH: x  Gluc: 92 mg/dL / Ketone: x  / Bili: x / Urobili: x   Blood: x / Protein: x / Nitrite: x   Leuk Esterase: x / RBC: x / WBC x   Sq Epi: x / Non Sq Epi: x / Bacteria: x        RADIOLOGY & ADDITIONAL STUDIES:

## 2024-06-02 NOTE — PROGRESS NOTE ADULT - ASSESSMENT
58yr old female presents to [facility] for a scheduled Laparoscopic Small Bowel Resection Stricturoplasty and Laparoscopic Ileostomy Creation on 5/29/2024. PMH of Crohn's dx in the small bowel (dx'd in 2004 Dr. Betts) s/p lap SBR 2004 for a SBO. She's now s/p Laparoscopic Small Bowel Resection Stricturoplasty and Laparoscopic Ileostomy Creation on 5/29/2024. Recovering well. Spivey removed 5/31, passed TOV.     Plan:  - Advance to LRD/IVF  - Continue PCEA until tolerating PO well  - Ostomy teaching  - PT  - DVT ppx    Red Surgery  [id number]

## 2024-06-03 ENCOUNTER — TRANSCRIPTION ENCOUNTER (OUTPATIENT)
Age: 59
End: 2024-06-03

## 2024-06-03 LAB
ANION GAP SERPL CALC-SCNC: 13 MMOL/L — SIGNIFICANT CHANGE UP (ref 5–17)
BUN SERPL-MCNC: 6 MG/DL — LOW (ref 7–23)
CALCIUM SERPL-MCNC: 9.5 MG/DL — SIGNIFICANT CHANGE UP (ref 8.4–10.5)
CHLORIDE SERPL-SCNC: 102 MMOL/L — SIGNIFICANT CHANGE UP (ref 96–108)
CO2 SERPL-SCNC: 25 MMOL/L — SIGNIFICANT CHANGE UP (ref 22–31)
CREAT SERPL-MCNC: 0.56 MG/DL — SIGNIFICANT CHANGE UP (ref 0.5–1.3)
EGFR: 107 ML/MIN/1.73M2 — SIGNIFICANT CHANGE UP
GLUCOSE SERPL-MCNC: 93 MG/DL — SIGNIFICANT CHANGE UP (ref 70–99)
HCT VFR BLD CALC: 25.3 % — LOW (ref 34.5–45)
HGB BLD-MCNC: 8.7 G/DL — LOW (ref 11.5–15.5)
MAGNESIUM SERPL-MCNC: 1.9 MG/DL — SIGNIFICANT CHANGE UP (ref 1.6–2.6)
MCHC RBC-ENTMCNC: 30.2 PG — SIGNIFICANT CHANGE UP (ref 27–34)
MCHC RBC-ENTMCNC: 34.4 GM/DL — SIGNIFICANT CHANGE UP (ref 32–36)
MCV RBC AUTO: 87.8 FL — SIGNIFICANT CHANGE UP (ref 80–100)
NRBC # BLD: 0 /100 WBCS — SIGNIFICANT CHANGE UP (ref 0–0)
PHOSPHATE SERPL-MCNC: 2.7 MG/DL — SIGNIFICANT CHANGE UP (ref 2.5–4.5)
PLATELET # BLD AUTO: 259 K/UL — SIGNIFICANT CHANGE UP (ref 150–400)
POTASSIUM SERPL-MCNC: 3.9 MMOL/L — SIGNIFICANT CHANGE UP (ref 3.5–5.3)
POTASSIUM SERPL-SCNC: 3.9 MMOL/L — SIGNIFICANT CHANGE UP (ref 3.5–5.3)
RBC # BLD: 2.88 M/UL — LOW (ref 3.8–5.2)
RBC # FLD: 13.7 % — SIGNIFICANT CHANGE UP (ref 10.3–14.5)
SODIUM SERPL-SCNC: 140 MMOL/L — SIGNIFICANT CHANGE UP (ref 135–145)
WBC # BLD: 7.5 K/UL — SIGNIFICANT CHANGE UP (ref 3.8–10.5)
WBC # FLD AUTO: 7.5 K/UL — SIGNIFICANT CHANGE UP (ref 3.8–10.5)

## 2024-06-03 RX ORDER — LOPERAMIDE HCL 2 MG
1 TABLET ORAL
Qty: 0 | Refills: 0 | DISCHARGE
Start: 2024-06-03

## 2024-06-03 RX ORDER — OXYCODONE HYDROCHLORIDE 5 MG/1
1 TABLET ORAL
Qty: 3 | Refills: 0
Start: 2024-06-03

## 2024-06-03 RX ORDER — ACETAMINOPHEN 500 MG
2 TABLET ORAL
Qty: 0 | Refills: 0 | DISCHARGE
Start: 2024-06-03

## 2024-06-03 RX ORDER — SODIUM,POTASSIUM PHOSPHATES 278-250MG
1 POWDER IN PACKET (EA) ORAL ONCE
Refills: 0 | Status: COMPLETED | OUTPATIENT
Start: 2024-06-03 | End: 2024-06-03

## 2024-06-03 RX ORDER — MAGNESIUM SULFATE 500 MG/ML
1 VIAL (ML) INJECTION ONCE
Refills: 0 | Status: COMPLETED | OUTPATIENT
Start: 2024-06-03 | End: 2024-06-03

## 2024-06-03 RX ORDER — KETOROLAC TROMETHAMINE 30 MG/ML
1 SYRINGE (ML) INJECTION
Qty: 10 | Refills: 0
Start: 2024-06-03

## 2024-06-03 RX ORDER — LOPERAMIDE HCL 2 MG
2 TABLET ORAL
Refills: 0 | Status: COMPLETED | OUTPATIENT
Start: 2024-06-03 | End: 2024-06-03

## 2024-06-03 RX ADMIN — Medication 400 MILLIGRAM(S): at 21:39

## 2024-06-03 RX ADMIN — Medication 650 MILLIGRAM(S): at 12:18

## 2024-06-03 RX ADMIN — Medication 650 MILLIGRAM(S): at 05:13

## 2024-06-03 RX ADMIN — Medication 400 MILLIGRAM(S): at 03:25

## 2024-06-03 RX ADMIN — Medication 100 GRAM(S): at 09:01

## 2024-06-03 RX ADMIN — HEPARIN SODIUM 5000 UNIT(S): 5000 INJECTION INTRAVENOUS; SUBCUTANEOUS at 21:09

## 2024-06-03 RX ADMIN — Medication 400 MILLIGRAM(S): at 09:43

## 2024-06-03 RX ADMIN — Medication 400 MILLIGRAM(S): at 10:15

## 2024-06-03 RX ADMIN — Medication 400 MILLIGRAM(S): at 14:53

## 2024-06-03 RX ADMIN — Medication 400 MILLIGRAM(S): at 21:09

## 2024-06-03 RX ADMIN — Medication 650 MILLIGRAM(S): at 23:40

## 2024-06-03 RX ADMIN — HEPARIN SODIUM 5000 UNIT(S): 5000 INJECTION INTRAVENOUS; SUBCUTANEOUS at 05:14

## 2024-06-03 RX ADMIN — Medication 400 MILLIGRAM(S): at 14:15

## 2024-06-03 RX ADMIN — Medication 2 MILLIGRAM(S): at 11:39

## 2024-06-03 RX ADMIN — HEPARIN SODIUM 5000 UNIT(S): 5000 INJECTION INTRAVENOUS; SUBCUTANEOUS at 14:15

## 2024-06-03 RX ADMIN — Medication 650 MILLIGRAM(S): at 17:29

## 2024-06-03 RX ADMIN — Medication 650 MILLIGRAM(S): at 11:39

## 2024-06-03 RX ADMIN — Medication 400 MILLIGRAM(S): at 06:05

## 2024-06-03 RX ADMIN — Medication 2 MILLIGRAM(S): at 17:29

## 2024-06-03 RX ADMIN — Medication 1 PACKET(S): at 09:00

## 2024-06-03 RX ADMIN — Medication 650 MILLIGRAM(S): at 06:05

## 2024-06-03 RX ADMIN — Medication 650 MILLIGRAM(S): at 18:29

## 2024-06-03 RX ADMIN — SODIUM CHLORIDE 100 MILLILITER(S): 9 INJECTION, SOLUTION INTRAVENOUS at 23:40

## 2024-06-03 NOTE — PROGRESS NOTE ADULT - ASSESSMENT
58yr old female presents to [facility] for a scheduled Laparoscopic Small Bowel Resection Stricturoplasty and Laparoscopic Ileostomy Creation on 5/29/2024. PMH of Crohn's dx in the small bowel (dx'd in 2004 Dr. Betts) s/p lap SBR 2004 for a SBO. She's now s/p Laparoscopic Small Bowel Resection Stricturoplasty and Laparoscopic Ileostomy Creation on 5/29/2024. Recovering well. Spivey removed 5/31, passed TOV.     Plan:  - Advance to LRD/IVF  - Pain control with PO meds   - Ostomy teaching  - Home PT  - DVT ppx    Red Surgery  [id number]

## 2024-06-03 NOTE — DISCHARGE NOTE PROVIDER - CARE PROVIDER_API CALL
Sawyer Morales  Colon/Rectal Surgery  [address] [place] [id number]  Phone: [phone]  Fax: [phone]  Follow Up Time:

## 2024-06-03 NOTE — DISCHARGE NOTE PROVIDER - DETAILS OF MALNUTRITION DIAGNOSIS/DIAGNOSES
This patient has been assessed with a concern for Malnutrition and was treated during this hospitalization for the following Nutrition diagnosis/diagnoses:     -  06/06/2024: Underweight (BMI < 19)

## 2024-06-03 NOTE — DISCHARGE NOTE PROVIDER - NSDCFUADDINST_GEN_ALL_CORE_FT
If increased ostomy o/p:     Metamucil once daily     If no response, Imodium 1 tab twice a day to max 2 tabs 4 times a day     If no response, add Lomotil- 1 tab twice a day to max 2 tabs 4 times a day

## 2024-06-03 NOTE — DISCHARGE NOTE PROVIDER - NSDCHHCONTRAHOME_GEN_ALL_CORE
Writer spoke with Floridalma, Gil's wife. Writer confirmed location, arrival time/date of Coronary Angiogram, scheduled on 05-08-23. Per Dr. Villanueva, per current CDC recommendations, pre procedure covid testing is no longer required for this procedure. Patient was informed that  parking is closed and instructed to park in the structure or the surface lots across from medical office buildings. Optional masking is the new protocol per CDC and hospital policy. Masks are available if wanted and needed for immunocompromised patients. Floridalma will accompany and care for Gil. Floridalma confirmed that she spoke with the doctor's nurse and all pre procedure instructions were given. Writer also instructed Floridalma with pre op shower/skin care. Floridalma verbalized understanding. Gil's special needs noted.      Activity restrictions due to illness/surgery/Wound - risk of deterioration/infection

## 2024-06-03 NOTE — PROGRESS NOTE ADULT - SUBJECTIVE AND OBJECTIVE BOX
SURGERY DAILY PROGRESS NOTE    SUBJECTIVE:     Overnight: tolerate LRD    Patient seen and evaluated on AM rounds.   Patient otherwise denies nausea, vomiting, chest pain, shortness of breath     OBJECTIVE:  Vital Signs Last 24 Hrs  T(C): 37.2 (03 Jun 2024 04:38), Max: 37.2 (03 Jun 2024 04:38)  T(F): 98.9 (03 Jun 2024 04:38), Max: 98.9 (03 Jun 2024 04:38)  HR: 89 (03 Jun 2024 04:38) (68 - 89)  BP: 129/85 (03 Jun 2024 04:38) (129/85 - 154/81)  BP(mean): --  RR: 18 (03 Jun 2024 04:38) (18 - 18)  SpO2: 97% (03 Jun 2024 04:38) (95% - 98%)    Parameters below as of 03 Jun 2024 04:38  Patient On (Oxygen Delivery Method): room air      Daily     Daily   ADAMA:      Chest Tube:      NG Tube:           STANDING  acetaminophen     Tablet .. 650 milliGRAM(s) Oral every 6 hours  heparin   Injectable 5000 Unit(s) SubCutaneous every 8 hours  ibuprofen  Tablet. 400 milliGRAM(s) Oral every 6 hours  lactated ringers. 1000 milliLiter(s) (100 mL/Hr) IV Continuous <Continuous>    PRN  oxyCODONE    IR 5 milliGRAM(s) Oral every 6 hours PRN Moderate Pain (4 - 6)  oxyCODONE    IR 10 milliGRAM(s) Oral every 6 hours PRN Severe Pain (7 - 10)      Labs:  140  |  102  |  5<L>  ----------------------------<  92    (06-02)  4.1   |  22  |  0.50          Ca    9.3      06-02  Mg    1.8  Phos  1.9<L>                  Physical Exam:  General: NAD  Respiratory: respirations non labored  Abdominal:  Soft, nontense, appropriate incisional tenderness, dressings clean and dry and intact; ostomy w liquid output

## 2024-06-03 NOTE — DISCHARGE NOTE PROVIDER - HOSPITAL COURSE
58yr old female presented to [facility] with PMHx of Crohn's dx in the small bowel (dx'd in 2004 Dr. Betts) s/p lap SBR 2004 for a SBO. Pt underwent Laparoscopic Small Bowel Resection Stricturoplasty and Laparoscopic diverting loop Ileostomy Creation on 5/29/2024 without incident. Pt was transferred to PACU for monitoring and subsequently to the floor once criteria was met. Pt received multimodal analgesia with PCEA and transitioned to oral analgesics when tolerated and evidence of bowel function. Pt received DVT prophylaxis with SQH, SCDs and early ambulation. Pt was seen by Physical Therapy for mobility and stoma nurses for ostomy teaching. Diet was advanced once there was evidence of ostomy function. On POD#3, pt had evidence of high output from ostomy for which she received adequate volume repletion and Imodium. 58 year old female presented to [facility] with PMHx of Crohn's dx in the small bowel (dx'd in 2004 Dr. Betts) s/p lap SBR 2004 for a SBO. Pt underwent Laparoscopic Small Bowel Resection Stricturoplasty and Laparoscopic diverting loop Ileostomy Creation on 5/29/2024 without incident. Pt was transferred to PACU for monitoring and subsequently to the floor once criteria was met. Pt received multimodal analgesia with PCEA and transitioned to oral analgesics when tolerated and evidence of bowel function. Pt received DVT prophylaxis with SQH, SCDs and early ambulation. Diet was advanced once there was evidence of ostomy function.   On POD#3 (6/01/24), pt had evidence of high output from ostomy for which she received adequate volume repletion and Imodium. Imodium dose was increased to max dose. Lomotil was then added, and also increased to max dose to help control high ostomy output  On POD #8 (6/06/24), pt was started on TPN. PICC was inserted with placement confirmed on CXR    Ostomy RN consulted for education and stoma yolanda removal.  Pt to be discharged with ostomy; ostomy teaching done    Pt to be discharged with TPN    Physical therapy evaluated the patient and recommended home PT    On the day of discharge, the patient's vital signs are within normal limits, pain is controlled, voiding urine, passing gas/stool via ileostomy, tolerating a low fiber diet, and ambulating well. Pt will f/u with . Pt will f/u with PCP in 1-2 weeks. 58 year old female presented to [facility] with PMHx of Crohn's dx in the small bowel (dx'd in 2004 Dr. Betts) s/p lap SBR 2004 for a SBO. Pt underwent Laparoscopic Small Bowel Resection Stricturoplasty and Laparoscopic diverting loop Ileostomy Creation on 5/29/2024 without incident. Pt was transferred to PACU for monitoring and subsequently to the floor once criteria was met. Pt received multimodal analgesia with PCEA and transitioned to oral analgesics when tolerated and evidence of bowel function. Pt received DVT prophylaxis with SQH, SCDs and early ambulation. Diet was advanced once there was evidence of ostomy function.   On POD#3 (6/01/24), pt had evidence of high output from ostomy for which she received adequate volume repletion and Imodium. Imodium dose was increased to max dose. Lomotil was then added, and also increased to max dose to help control high ostomy output  On POD #8 (6/06/24), pt was started on TPN. PICC was inserted with placement confirmed on CXR    Ostomy RN consulted for education and stoma yolanda removal.  Pt to be discharged with ostomy; ostomy teaching done    Pt to be discharged with TPN    Physical therapy evaluated the patient and recommended no skilled PT needs    On the day of discharge, the patient's vital signs are within normal limits, pain is controlled, voiding urine, passing gas/stool via ileostomy, tolerating a low fiber diet, and ambulating well. Pt will f/u with . Pt will f/u with PCP in 1-2 weeks. 58 year old female presented to [facility] with PMHx of Crohn's dx in the small bowel (dx'd in 2004 Dr. Betts) s/p lap SBR 2004 for a SBO. Pt underwent Laparoscopic Small Bowel Resection Stricturoplasty and Laparoscopic diverting loop Ileostomy Creation on 5/29/2024 without incident. Pt was transferred to PACU for monitoring and subsequently to the floor once criteria was met. Pt received multimodal analgesia with PCEA and transitioned to oral analgesics when tolerated and evidence of bowel function. Pt received DVT prophylaxis with SQH, SCDs and early ambulation. Diet was advanced once there was evidence of ostomy function.   On POD#3 (6/01/24), pt had evidence of high output from ostomy for which she received adequate volume repletion and Imodium. Imodium dose was increased to max dose. Lomotil was then added, and also increased to max dose to help control high ostomy output  On POD #8 (6/06/24), pt was started on TPN. PICC was inserted with placement confirmed on CXR    Ostomy RN consulted for education and stoma yolanda removal.  Pt to be discharged with ostomy; ostomy teaching done    Pt to be discharged with TPN    Physical therapy evaluated the patient and recommended no skilled PT needs    On the day of discharge, the patient's vital signs are within normal limits, pain is controlled, voiding urine, passing gas/stool via ileostomy, tolerating a low fiber diet, and ambulating well. Pt will f/u with  in 2 weeks. Pt will f/u with PCP in 1-2 weeks. 58 year old female presented to [facility] with PMHx of Crohn's dx in the small bowel (dx'd in 2004 Dr. Betts) s/p lap SBR 2004 for a SBO. Pt underwent Laparoscopic Small Bowel Resection Stricturoplasty and Laparoscopic diverting loop Ileostomy Creation on 5/29/2024 without incident. Pt was transferred to PACU for monitoring and subsequently to the floor once criteria was met. Pt received multimodal analgesia with PCEA and transitioned to oral analgesics when tolerated and evidence of bowel function. Pt received DVT prophylaxis with SQH, SCDs and early ambulation. Diet was advanced once there was evidence of ostomy function.    On POD#3 (6/01/24), pt had evidence of high output from ostomy for which she received adequate volume repletion and Imodium. Imodium dose was increased to max dose. Lomotil was then added, and also increased to max dose to help control high ostomy output.    On POD #8 (6/06/24), pt was started on TPN. PICC was inserted with placement confirmed on CXR.    Ostomy RN consulted for education and stoma yolanda removal.  Pt to be discharged with ostomy; ostomy teaching done  Pt to be discharged with TPN.    Physical therapy evaluated the patient and recommended no skilled PT needs.    On the day of discharge, the patient's vital signs are within normal limits, pain is controlled, voiding urine, passing gas/stool via ileostomy, tolerating a low fiber diet, and ambulating well. Pt will f/u with 's team in 2 weeks. Pt will f/u with PCP in 1-2 weeks.

## 2024-06-03 NOTE — ADVANCED PRACTICE NURSE CONSULT - ASSESSMENT
Chart reviewed & events noted to date. Pt OOB to recliner, awake & alert. Just completed eating lunch. Introduced self & role of CWOCN (pt seen by colleague on Friday). Pt endorses feeling much better, "today has been the best I felt". Pt endorses previous visits; pt "sleepy/tired", but now "feeling more like myself". Briefly reviewed anatomy & function & basics of ostomy care as well as returning to "normalcy" (lifestyle, work (teacher), showering, hobbies, etc). Ileostomy output has been high (1475 ml/24 hr) and imodium to be initiated.  Reviewed basics of ostomy care & steps for emptying pouch. Pt currently in high output pouch but reviewed steps for emptying w/drainable pouch & did well. Pouch seal intact, loop ileostomy pink & viable functioning +flatus +liquid kingsley brown stool.  Reviewed dietary progression, cautions & ileostomy dietary modifications including importance of hydration, chewing food thoroughly, s/s of blockage & how to treat if symptoms arise, & foods to help thicken output. Pt verbalized understanding using verbal teach back & reviewed daily intake. Written materials at bedside. Briefly discussed product info #s,& supplies ordering process. Ostomy RNs to f/u tomorrow to continue ostomy teaching. Support & encouragement provided throughout visit. Staff to reinforce teaching.  Chart reviewed & events noted to date. Pt OOB to recliner, awake & alert. Just completed eating lunch. Introduced self & role of CWOCN (pt seen by colleague on Friday). Pt endorses feeling much better, "today has been the best I felt". Pt endorses previous visits; pt "sleepy/tired", but now "feeling more like myself". Pt motivated to learn ostomy care & participated in lesson. Pt verbalized understanding of previous ostomy lesson & as verbal teach back performed by pt re: emptying pouch/closing pouch tail & steps for complete pouching system change.  Briefly reviewed anatomy & function & basics of ostomy care as well as returning to "normalcy" (lifestyle, work (teacher), showering, hobbies, etc). Ileostomy output has been high (1475 ml/24 hr) and imodium to be initiated.  Reviewed basics of ostomy care & steps for emptying pouch. Pt currently in high output pouch but reviewed steps for emptying w/drainable pouch & did well. Pouch seal intact, loop ileostomy pink & viable functioning +flatus +liquid kingsley brown stool.  Reviewed dietary progression, cautions & ileostomy dietary modifications including importance of hydration, chewing food thoroughly, s/s of blockage & how to treat if symptoms arise, & foods to help thicken output. Pt verbalized understanding using verbal teach back & reviewed daily intake. Written materials at bedside. Briefly discussed product info #s,& supplies ordering process. Ostomy RNs to f/u tomorrow to continue ostomy teaching. Support & encouragement provided throughout visit. Staff to reinforce teaching.

## 2024-06-03 NOTE — DISCHARGE NOTE PROVIDER - NSDCMRMEDTOKEN_GEN_ALL_CORE_FT
acetaminophen 325 mg oral tablet: 2 tab(s) orally every 6 hours as needed for  mild pain Max Tylenol dose should not exceed 4000mg/24 hrs  loperamide 2 mg oral capsule: 1 cap(s) orally 2 times a day Take 1 tab orally twice daily as needed for high ostomy output  oxyCODONE 5 mg oral tablet: 1 tab(s) orally every 4 hours as needed for  severe pain MDD: 3  Vitamin B-12 1000 mcg oral tablet: 1 tab(s) orally once a day  Vitamin D3 125 mcg (5000 intl units) oral tablet: 1 tab(s) orally once a day   acetaminophen 325 mg oral tablet: 2 tab(s) orally every 6 hours as needed for  mild pain Max Tylenol dose should not exceed 4000mg/24 hrs  diphenoxylate-atropine 2.5 mg-0.025 mg oral tablet: 2 tab(s) orally 4 times a day as needed for  diarrhea MDD: 8 tabs  fat emulsion with fish, medium chain, olive, and soy oil 20% intravenous emulsion: 45 gram(s) intravenous once a day  intravenous electrolyte (Lypholyte II/Nutrilyte II/TPN Electrolytes) solution: 1 each intravenous once a day  loperamide 2 mg oral capsule: 1 cap(s) orally 2 times a day Take 1 tab orally twice daily as needed for high ostomy output  loperamide 2 mg oral capsule: 2 cap(s) orally 4 times a day as needed for  diarrhea  oxyCODONE 5 mg oral tablet: 1 tab(s) orally every 4 hours as needed for  severe pain MDD: 3  psyllium 3.4 g/7 g oral powder for reconstitution: 3.4 gram(s) orally once a day as needed for  diarrhea mix into 4oz of water and drink as needed MDD: 1 packet  Vitamin B-12 1000 mcg oral tablet: 1 tab(s) orally once a day  Vitamin D3 125 mcg (5000 intl units) oral tablet: 1 tab(s) orally once a day

## 2024-06-03 NOTE — DISCHARGE NOTE PROVIDER - NSDCCPCAREPLAN_GEN_ALL_CORE_FT
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 A prescription for Oxycodone that has been sent to your pharmacy. Oxycodone is to be taken only for severe pain only needed every 4 hours.   WOUND CARE: Keep incision clean dry  OSTOMY: Ostomy Care as instructed  BATHING: You may shower and/or sponge bathe.  ACTIVITY: No heavy lifting anything more than 10-15lbs or straining. Otherwise, you may return to your usual level of physical activity. If you are taking narcotic pain medication (such as Percocet), do NOT drive a car, operate machinery or make important decisions.  NOTIFY YOUR SURGEON IF: You have any bleeding that does not stop, any fever (over 100.4 F) or chills, persistent nausea/vomiting with inability to tolerate food or liquids, persistent diarrhea, or if your pain is not controlled on your discharge pain medications.  FOLLOW-UP:  Please follow up with Dr. Morales as instructed.     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

## 2024-06-03 NOTE — DISCHARGE NOTE PROVIDER - NSDCCPTREATMENT_GEN_ALL_CORE_FT
PRINCIPAL PROCEDURE  Procedure: Laparoscopic ileocolic resection  Findings and Treatment:       SECONDARY PROCEDURE  Procedure: Single incision laparoscopic small bowel resection  Findings and Treatment:      PRINCIPAL PROCEDURE  Procedure: Single incision laparoscopic small bowel resection  Findings and Treatment: OSTOMY: Continue taking Metamucil 1 tablespoon once daily- mixed in 4 ounces of liquid, Imodium 2 tabs 4 times a day (30 minutes prior to each meal and at bedtime), and Lomotil 2 tabs 4 times a day (30 minutes prior to each meal and at bedtime). If output continues to be greater than 1500ml, then you can slowly increase Imodium to 2 tabs 4 times day (max of 8 tabs daily)  WOUND CARE: Keep your incisions clean and dry.  BATHING: You may shower and/or sponge bathe. Remove outer dressing prior to shower. Let soap and water run over incision; do NOT scrub incision. Pat abdomen dry after, and replace with gauze with paper tape. Do no submerge the incision underwater for the next 2 weeks.   ACTIVITY: No heavy lifting anything more than 10-15lbs or straining. Otherwise, you may return to your usual level of physical activity. If you are taking narcotic pain medication (such as Percocet), do NOT drive a car, operate machinery or make important decisions.  DIET: Maintain Low Fiber Diet until your next appointment. Avoid raw fruits and vegetables. Thoroughly cooked vegetables that are soft and easily mashed with a fork are ok to eat. Bananas are also ok to eat.  PAIN: A prescription for oxycodone has been sent to the pharmacy. You should only take these for severe pain. For mild or moderate pain, you may take 975mg of tylenol every 6 hours. Do not exceed more than 4G per day.   NOTIFY YOUR SURGEON IF: You have any bleeding that does not stop, any pus draining from your wound, any fever (over 100.4 F) or chills, persistent nausea/vomiting with inability to tolerate food or liquids, persistent diarrhea, or if severe abdominal pain is not controlled on your discharge pain medications.  FOLLOW-UP:  1. Follow up   2. Please follow up with your primary care physician in one week regarding your hospitalization.

## 2024-06-04 LAB
ANION GAP SERPL CALC-SCNC: 11 MMOL/L — SIGNIFICANT CHANGE UP (ref 5–17)
BUN SERPL-MCNC: 6 MG/DL — LOW (ref 7–23)
CALCIUM SERPL-MCNC: 9.6 MG/DL — SIGNIFICANT CHANGE UP (ref 8.4–10.5)
CHLORIDE SERPL-SCNC: 104 MMOL/L — SIGNIFICANT CHANGE UP (ref 96–108)
CO2 SERPL-SCNC: 28 MMOL/L — SIGNIFICANT CHANGE UP (ref 22–31)
CREAT SERPL-MCNC: 0.61 MG/DL — SIGNIFICANT CHANGE UP (ref 0.5–1.3)
EGFR: 105 ML/MIN/1.73M2 — SIGNIFICANT CHANGE UP
GLUCOSE SERPL-MCNC: 95 MG/DL — SIGNIFICANT CHANGE UP (ref 70–99)
HCT VFR BLD CALC: 26 % — LOW (ref 34.5–45)
HGB BLD-MCNC: 8.8 G/DL — LOW (ref 11.5–15.5)
MAGNESIUM SERPL-MCNC: 1.8 MG/DL — SIGNIFICANT CHANGE UP (ref 1.6–2.6)
MCHC RBC-ENTMCNC: 30.2 PG — SIGNIFICANT CHANGE UP (ref 27–34)
MCHC RBC-ENTMCNC: 33.8 GM/DL — SIGNIFICANT CHANGE UP (ref 32–36)
MCV RBC AUTO: 89.3 FL — SIGNIFICANT CHANGE UP (ref 80–100)
NRBC # BLD: 0 /100 WBCS — SIGNIFICANT CHANGE UP (ref 0–0)
PHOSPHATE SERPL-MCNC: 2.8 MG/DL — SIGNIFICANT CHANGE UP (ref 2.5–4.5)
PLATELET # BLD AUTO: 248 K/UL — SIGNIFICANT CHANGE UP (ref 150–400)
POTASSIUM SERPL-MCNC: 3.6 MMOL/L — SIGNIFICANT CHANGE UP (ref 3.5–5.3)
POTASSIUM SERPL-SCNC: 3.6 MMOL/L — SIGNIFICANT CHANGE UP (ref 3.5–5.3)
RBC # BLD: 2.91 M/UL — LOW (ref 3.8–5.2)
RBC # FLD: 13.7 % — SIGNIFICANT CHANGE UP (ref 10.3–14.5)
SODIUM SERPL-SCNC: 143 MMOL/L — SIGNIFICANT CHANGE UP (ref 135–145)
WBC # BLD: 5.34 K/UL — SIGNIFICANT CHANGE UP (ref 3.8–10.5)
WBC # FLD AUTO: 5.34 K/UL — SIGNIFICANT CHANGE UP (ref 3.8–10.5)

## 2024-06-04 RX ORDER — PSYLLIUM SEED (WITH DEXTROSE)
1 POWDER (GRAM) ORAL DAILY
Refills: 0 | Status: DISCONTINUED | OUTPATIENT
Start: 2024-06-04 | End: 2024-06-10

## 2024-06-04 RX ORDER — MAGNESIUM SULFATE 500 MG/ML
1 VIAL (ML) INJECTION ONCE
Refills: 0 | Status: COMPLETED | OUTPATIENT
Start: 2024-06-04 | End: 2024-06-04

## 2024-06-04 RX ORDER — POTASSIUM CHLORIDE 20 MEQ
20 PACKET (EA) ORAL
Refills: 0 | Status: COMPLETED | OUTPATIENT
Start: 2024-06-04 | End: 2024-06-04

## 2024-06-04 RX ORDER — LOPERAMIDE HCL 2 MG
2 TABLET ORAL
Refills: 0 | Status: DISCONTINUED | OUTPATIENT
Start: 2024-06-04 | End: 2024-06-05

## 2024-06-04 RX ORDER — LOPERAMIDE HCL 2 MG
2 TABLET ORAL AT BEDTIME
Refills: 0 | Status: DISCONTINUED | OUTPATIENT
Start: 2024-06-04 | End: 2024-06-04

## 2024-06-04 RX ADMIN — Medication 400 MILLIGRAM(S): at 21:49

## 2024-06-04 RX ADMIN — Medication 650 MILLIGRAM(S): at 00:10

## 2024-06-04 RX ADMIN — Medication 650 MILLIGRAM(S): at 23:08

## 2024-06-04 RX ADMIN — Medication 650 MILLIGRAM(S): at 05:53

## 2024-06-04 RX ADMIN — Medication 20 MILLIEQUIVALENT(S): at 11:17

## 2024-06-04 RX ADMIN — Medication 650 MILLIGRAM(S): at 12:50

## 2024-06-04 RX ADMIN — Medication 650 MILLIGRAM(S): at 23:38

## 2024-06-04 RX ADMIN — Medication 400 MILLIGRAM(S): at 04:00

## 2024-06-04 RX ADMIN — Medication 1 PACKET(S): at 11:10

## 2024-06-04 RX ADMIN — Medication 2 MILLIGRAM(S): at 17:23

## 2024-06-04 RX ADMIN — HEPARIN SODIUM 5000 UNIT(S): 5000 INJECTION INTRAVENOUS; SUBCUTANEOUS at 13:47

## 2024-06-04 RX ADMIN — Medication 650 MILLIGRAM(S): at 05:23

## 2024-06-04 RX ADMIN — Medication 20 MILLIEQUIVALENT(S): at 13:46

## 2024-06-04 RX ADMIN — HEPARIN SODIUM 5000 UNIT(S): 5000 INJECTION INTRAVENOUS; SUBCUTANEOUS at 21:19

## 2024-06-04 RX ADMIN — Medication 400 MILLIGRAM(S): at 11:11

## 2024-06-04 RX ADMIN — Medication 400 MILLIGRAM(S): at 04:30

## 2024-06-04 RX ADMIN — Medication 100 GRAM(S): at 11:17

## 2024-06-04 RX ADMIN — SODIUM CHLORIDE 50 MILLILITER(S): 9 INJECTION, SOLUTION INTRAVENOUS at 17:24

## 2024-06-04 RX ADMIN — Medication 400 MILLIGRAM(S): at 21:19

## 2024-06-04 RX ADMIN — Medication 400 MILLIGRAM(S): at 17:23

## 2024-06-04 RX ADMIN — Medication 2 MILLIGRAM(S): at 11:10

## 2024-06-04 RX ADMIN — Medication 2 MILLIGRAM(S): at 21:18

## 2024-06-04 RX ADMIN — Medication 650 MILLIGRAM(S): at 18:57

## 2024-06-04 RX ADMIN — HEPARIN SODIUM 5000 UNIT(S): 5000 INJECTION INTRAVENOUS; SUBCUTANEOUS at 05:23

## 2024-06-04 RX ADMIN — Medication 2 MILLIGRAM(S): at 12:52

## 2024-06-04 NOTE — PROGRESS NOTE ADULT - ASSESSMENT
58yr old female presents to [facility] for a scheduled Laparoscopic Small Bowel Resection Stricturoplasty and Laparoscopic Ileostomy Creation on 5/29/2024. PMH of Crohn's dx in the small bowel (dx'd in 2004 Dr. Betts) s/p lap SBR 2004 for a SBO. She's now s/p Laparoscopic Small Bowel Resection Stricturoplasty and Laparoscopic Ileostomy Creation on 5/29/2024. Recovering well. Spivey removed 5/31, passed TOV.     Plan:  - LRD/IVF; may need TPN  - Metamucil and immodium before meals  - Pain control with PO meds   - Ostomy teaching, monitor ostomy output  - Home PT  - DVT ppx    Red Surgery  [id number]

## 2024-06-04 NOTE — PROGRESS NOTE ADULT - SUBJECTIVE AND OBJECTIVE BOX
SURGERY DAILY PROGRESS NOTE:     SUBJECTIVE/ROS: Patient seen and examined. Patient feels well. She reports tolerating diet. Ambulating through hallways. Reports pain is well controlled.   Denies nausea, vomiting, chest pain, shortness of breath     MEDICATIONS  (STANDING):  acetaminophen     Tablet .. 650 milliGRAM(s) Oral every 6 hours  heparin   Injectable 5000 Unit(s) SubCutaneous every 8 hours  ibuprofen  Tablet. 400 milliGRAM(s) Oral every 6 hours  lactated ringers. 1000 milliLiter(s) (100 mL/Hr) IV Continuous <Continuous>  loperamide 2 milliGRAM(s) Oral at bedtime  psyllium Powder 1 Packet(s) Oral daily    MEDICATIONS  (PRN):  oxyCODONE    IR 5 milliGRAM(s) Oral every 6 hours PRN Moderate Pain (4 - 6)  oxyCODONE    IR 10 milliGRAM(s) Oral every 6 hours PRN Severe Pain (7 - 10)    OBJECTIVE:  Vital Signs Last 24 Hrs  T(C): 36.9 (04 Jun 2024 08:41), Max: 37.3 (04 Jun 2024 00:04)  T(F): 98.5 (04 Jun 2024 08:41), Max: 99.2 (04 Jun 2024 00:04)  HR: 87 (04 Jun 2024 08:41) (80 - 90)  BP: 117/80 (04 Jun 2024 08:41) (115/74 - 141/88)  BP(mean): --  RR: 18 (04 Jun 2024 08:41) (18 - 18)  SpO2: 99% (04 Jun 2024 08:41) (95% - 99%)    Parameters below as of 04 Jun 2024 08:41  Patient On (Oxygen Delivery Method): room air      I&O's Detail  03 Jun 2024 07:01  -  04 Jun 2024 07:00  --------------------------------------------------------  IN:    IV PiggyBack: 50 mL    Lactated Ringers: 2400 mL    Oral Fluid: 870 mL  Total IN: 3320 mL    OUT:    Ileostomy (mL): 1910 mL    Voided (mL): 2000 mL  Total OUT: 3910 mL  Total NET: -590 mL        LABS:                        8.8    5.34  )-----------( 248      ( 04 Jun 2024 07:24 )             26.0     06-04    143  |  104  |  6<L>  ----------------------------<  95  3.6   |  28  |  0.61    Ca    9.6      04 Jun 2024 07:22  Phos  2.8     06-04  Mg     1.8     06-04      Urinalysis Basic - ( 04 Jun 2024 07:22 )  Color: x / Appearance: x / SG: x / pH: x  Gluc: 95 mg/dL / Ketone: x  / Bili: x / Urobili: x   Blood: x / Protein: x / Nitrite: x   Leuk Esterase: x / RBC: x / WBC x   Sq Epi: x / Non Sq Epi: x / Bacteria: x

## 2024-06-04 NOTE — ADVANCED PRACTICE NURSE CONSULT - ASSESSMENT
In at bedside to f/u & continue ostomy teaching. Pt OOB in chair and receptive to learning. Complete pouching system changed. Loop ileostomy 1 1/2" pink & viable, functioning for liquid stool (noted 1910cc /24hr). Peristomal skin is intact. Mucocutaneous junction intact. Re-pouched w/ 2 1/4" flat skin barrier, bead of stoma paste applied at the back of skin barrier to caulk & high output pouch to cline bag.  Pt observed as reviewed steps for change, receptive to teaching & asked appropriate questions. Reviewed ileostomy dietary modifications (including low fiber, importance of chewing foods well & importance of hydration). Briefly reviewed products info & ordering process. Pt expresses understanding & receptive to teaching. Staff to reinforce teaching. Supplies ,pattern left at bedside. Emotional support provided.

## 2024-06-05 LAB
ANION GAP SERPL CALC-SCNC: 12 MMOL/L — SIGNIFICANT CHANGE UP (ref 5–17)
BUN SERPL-MCNC: 7 MG/DL — SIGNIFICANT CHANGE UP (ref 7–23)
CALCIUM SERPL-MCNC: 9.8 MG/DL — SIGNIFICANT CHANGE UP (ref 8.4–10.5)
CHLORIDE SERPL-SCNC: 102 MMOL/L — SIGNIFICANT CHANGE UP (ref 96–108)
CO2 SERPL-SCNC: 26 MMOL/L — SIGNIFICANT CHANGE UP (ref 22–31)
CREAT SERPL-MCNC: 0.62 MG/DL — SIGNIFICANT CHANGE UP (ref 0.5–1.3)
EGFR: 104 ML/MIN/1.73M2 — SIGNIFICANT CHANGE UP
GLUCOSE SERPL-MCNC: 98 MG/DL — SIGNIFICANT CHANGE UP (ref 70–99)
HCT VFR BLD CALC: 25.8 % — LOW (ref 34.5–45)
HGB BLD-MCNC: 8.6 G/DL — LOW (ref 11.5–15.5)
MAGNESIUM SERPL-MCNC: 2.1 MG/DL — SIGNIFICANT CHANGE UP (ref 1.6–2.6)
MCHC RBC-ENTMCNC: 29.8 PG — SIGNIFICANT CHANGE UP (ref 27–34)
MCHC RBC-ENTMCNC: 33.3 GM/DL — SIGNIFICANT CHANGE UP (ref 32–36)
MCV RBC AUTO: 89.3 FL — SIGNIFICANT CHANGE UP (ref 80–100)
NRBC # BLD: 0 /100 WBCS — SIGNIFICANT CHANGE UP (ref 0–0)
PHOSPHATE SERPL-MCNC: 2.8 MG/DL — SIGNIFICANT CHANGE UP (ref 2.5–4.5)
PLATELET # BLD AUTO: 288 K/UL — SIGNIFICANT CHANGE UP (ref 150–400)
POTASSIUM SERPL-MCNC: 4.2 MMOL/L — SIGNIFICANT CHANGE UP (ref 3.5–5.3)
POTASSIUM SERPL-SCNC: 4.2 MMOL/L — SIGNIFICANT CHANGE UP (ref 3.5–5.3)
RBC # BLD: 2.89 M/UL — LOW (ref 3.8–5.2)
RBC # FLD: 14.1 % — SIGNIFICANT CHANGE UP (ref 10.3–14.5)
SODIUM SERPL-SCNC: 140 MMOL/L — SIGNIFICANT CHANGE UP (ref 135–145)
WBC # BLD: 6.54 K/UL — SIGNIFICANT CHANGE UP (ref 3.8–10.5)
WBC # FLD AUTO: 6.54 K/UL — SIGNIFICANT CHANGE UP (ref 3.8–10.5)

## 2024-06-05 RX ORDER — SODIUM,POTASSIUM PHOSPHATES 278-250MG
1 POWDER IN PACKET (EA) ORAL ONCE
Refills: 0 | Status: COMPLETED | OUTPATIENT
Start: 2024-06-05 | End: 2024-06-05

## 2024-06-05 RX ORDER — LOPERAMIDE HCL 2 MG
4 TABLET ORAL
Refills: 0 | Status: DISCONTINUED | OUTPATIENT
Start: 2024-06-05 | End: 2024-06-10

## 2024-06-05 RX ADMIN — Medication 4 MILLIGRAM(S): at 21:46

## 2024-06-05 RX ADMIN — Medication 1 PACKET(S): at 14:39

## 2024-06-05 RX ADMIN — Medication 2 MILLIGRAM(S): at 07:57

## 2024-06-05 RX ADMIN — Medication 400 MILLIGRAM(S): at 14:39

## 2024-06-05 RX ADMIN — Medication 4 MILLIGRAM(S): at 18:08

## 2024-06-05 RX ADMIN — Medication 1 PACKET(S): at 10:55

## 2024-06-05 RX ADMIN — Medication 650 MILLIGRAM(S): at 12:38

## 2024-06-05 RX ADMIN — Medication 400 MILLIGRAM(S): at 21:45

## 2024-06-05 RX ADMIN — SODIUM CHLORIDE 50 MILLILITER(S): 9 INJECTION, SOLUTION INTRAVENOUS at 09:01

## 2024-06-05 RX ADMIN — Medication 650 MILLIGRAM(S): at 18:08

## 2024-06-05 RX ADMIN — Medication 400 MILLIGRAM(S): at 22:15

## 2024-06-05 RX ADMIN — Medication 400 MILLIGRAM(S): at 09:01

## 2024-06-05 RX ADMIN — Medication 400 MILLIGRAM(S): at 03:30

## 2024-06-05 RX ADMIN — Medication 4 MILLIGRAM(S): at 12:39

## 2024-06-05 RX ADMIN — Medication 650 MILLIGRAM(S): at 06:15

## 2024-06-05 RX ADMIN — HEPARIN SODIUM 5000 UNIT(S): 5000 INJECTION INTRAVENOUS; SUBCUTANEOUS at 21:45

## 2024-06-05 RX ADMIN — Medication 400 MILLIGRAM(S): at 04:00

## 2024-06-05 RX ADMIN — Medication 650 MILLIGRAM(S): at 05:45

## 2024-06-05 NOTE — ADVANCED PRACTICE NURSE CONSULT - ASSESSMENT
Chart reviewed & events noted to date. In to see pt today to continue stoma teaching. Pt in good spirits, OOB & ambulating, tolerating diet. Pt concerned with stoma output (remains high- 2, 775 cc/24hr). Encouraging liquids (in b/w meals & foods to help thicken output). Pouching system intact, stoma pink & viable & functioning +flatus +kingsley yellow liquid effluent. Attempted to change high output pouch to drainable pouch, but remains w/liquid output. Discussed w/pt & staff RN, that as stool becomes thickened will change to drainable pouch. Pt & staff RN agree w/plan of care. Pt to practice w/emptying drainable pouch as d/c planning in progress.   Reviewed dietary progression, cautions & ileostomy dietary modifications including importance of hydration, chewing food thoroughly, s/s of blockage & how to treat if symptoms arise, & foods to help thicken output.   Discussed & reviewed ostomy discharge instructions regarding ostomy care, potential complications to report & seek help from the healthcare team (surgeon/CWOCN), & transitioning to home care. Discussed & reviewed product #s, supply info, ordering process & support group info. Encourage pt to continue performing self-care w/assist as needed by staff. Staff to reinforce teaching.  Pt & staff aware of plan of care. Supplies/pattern provided along w/reviewed educational material at bedside.   Pt appreciative of time/support. Ostomy RNs to f/u.  Emotional support and encouragement provided throughout visit & and discussed homecare to f/u upon d/c to reinforce teaching.

## 2024-06-05 NOTE — PROGRESS NOTE ADULT - ASSESSMENT
58yr old female presents to [facility] for a scheduled Laparoscopic Small Bowel Resection Stricturoplasty and Laparoscopic Ileostomy Creation on 5/29/2024. PMH of Crohn's dx in the small bowel (dx'd in 2004 Dr. Betts) s/p lap SBR 2004 for a SBO. She's now s/p Laparoscopic Small Bowel Resection Stricturoplasty and Laparoscopic Ileostomy Creation on 5/29/2024. Recovering well. Spivey removed 5/31, passed TOV.     Plan:  - Advance to LRD/IVF  - Pain control with PO meds   - Ostomy teaching  - Home PT  - DVT ppx    Red Surgery  [id number]

## 2024-06-05 NOTE — PROGRESS NOTE ADULT - SUBJECTIVE AND OBJECTIVE BOX
SURGERY DAILY PROGRESS NOTE    SUBJECTIVE:     Overnight: no acute events     Patient seen and evaluated on AM rounds.   Patient otherwise denies nausea, vomiting, chest pain, shortness of breath     OBJECTIVE:  Vital Signs Last 24 Hrs  T(C): 36.7 (05 Jun 2024 08:06), Max: 37 (04 Jun 2024 16:48)  T(F): 98.1 (05 Jun 2024 08:06), Max: 98.6 (04 Jun 2024 16:48)  HR: 75 (05 Jun 2024 08:06) (75 - 88)  BP: 118/74 (05 Jun 2024 08:06) (108/72 - 120/82)  BP(mean): --  RR: 18 (05 Jun 2024 08:06) (18 - 18)  SpO2: 99% (05 Jun 2024 08:06) (98% - 99%)    Parameters below as of 05 Jun 2024 08:06  Patient On (Oxygen Delivery Method): room air      Daily     Daily   ADAMA:      Chest Tube:      NG Tube:           STANDING  acetaminophen     Tablet .. 650 milliGRAM(s) Oral every 6 hours  heparin   Injectable 5000 Unit(s) SubCutaneous every 8 hours  ibuprofen  Tablet. 400 milliGRAM(s) Oral every 6 hours  lactated ringers. 1000 milliLiter(s) (50 mL/Hr) IV Continuous <Continuous>  loperamide 4 milliGRAM(s) Oral four times a day  potassium phosphate / sodium phosphate Powder (PHOS-NaK) 1 Packet(s) Oral once  psyllium Powder 1 Packet(s) Oral daily    PRN  oxyCODONE    IR 5 milliGRAM(s) Oral every 6 hours PRN Moderate Pain (4 - 6)  oxyCODONE    IR 10 milliGRAM(s) Oral every 6 hours PRN Severe Pain (7 - 10)      Labs:  140  |  102  |  7  ----------------------------<  98    (06-05)  4.2   |  26  |  0.62          Ca    9.8      06-05  Mg    2.1  Phos  2.8          Urinalysis Basic - ( 05 Jun 2024 06:48 )    Color: x / Appearance: x / SG: x / pH: x  Gluc: 98 mg/dL / Ketone: x  / Bili: x / Urobili: x   Blood: x / Protein: x / Nitrite: x   Leuk Esterase: x / RBC: x / WBC x   Sq Epi: x / Non Sq Epi: x / Bacteria: x      Urinalysis Basic - ( 05 Jun 2024 06:48 )    Color: x / Appearance: x / SG: x / pH: x  Gluc: 98 mg/dL / Ketone: x  / Bili: x / Urobili: x   Blood: x / Protein: x / Nitrite: x   Leuk Esterase: x / RBC: x / WBC x   Sq Epi: x / Non Sq Epi: x / Bacteria: x          Physical Exam:  General: NAD  Respiratory: respirations non labored  Abdominal: Soft, nontense, appropriate incisional tenderness, dressings clean and dry and intact; ostomy w liquid output

## 2024-06-06 LAB
ANION GAP SERPL CALC-SCNC: 12 MMOL/L — SIGNIFICANT CHANGE UP (ref 5–17)
BUN SERPL-MCNC: 9 MG/DL — SIGNIFICANT CHANGE UP (ref 7–23)
CALCIUM SERPL-MCNC: 10 MG/DL — SIGNIFICANT CHANGE UP (ref 8.4–10.5)
CHLORIDE SERPL-SCNC: 102 MMOL/L — SIGNIFICANT CHANGE UP (ref 96–108)
CO2 SERPL-SCNC: 26 MMOL/L — SIGNIFICANT CHANGE UP (ref 22–31)
CREAT SERPL-MCNC: 0.68 MG/DL — SIGNIFICANT CHANGE UP (ref 0.5–1.3)
EGFR: 102 ML/MIN/1.73M2 — SIGNIFICANT CHANGE UP
GLUCOSE BLDC GLUCOMTR-MCNC: 117 MG/DL — HIGH (ref 70–99)
GLUCOSE BLDC GLUCOMTR-MCNC: 118 MG/DL — HIGH (ref 70–99)
GLUCOSE SERPL-MCNC: 94 MG/DL — SIGNIFICANT CHANGE UP (ref 70–99)
HCT VFR BLD CALC: 25.7 % — LOW (ref 34.5–45)
HGB BLD-MCNC: 8.5 G/DL — LOW (ref 11.5–15.5)
MAGNESIUM SERPL-MCNC: 1.9 MG/DL — SIGNIFICANT CHANGE UP (ref 1.6–2.6)
MCHC RBC-ENTMCNC: 29.9 PG — SIGNIFICANT CHANGE UP (ref 27–34)
MCHC RBC-ENTMCNC: 33.1 GM/DL — SIGNIFICANT CHANGE UP (ref 32–36)
MCV RBC AUTO: 90.5 FL — SIGNIFICANT CHANGE UP (ref 80–100)
NRBC # BLD: 0 /100 WBCS — SIGNIFICANT CHANGE UP (ref 0–0)
PHOSPHATE SERPL-MCNC: 3.4 MG/DL — SIGNIFICANT CHANGE UP (ref 2.5–4.5)
PLATELET # BLD AUTO: 286 K/UL — SIGNIFICANT CHANGE UP (ref 150–400)
POTASSIUM SERPL-MCNC: 4.4 MMOL/L — SIGNIFICANT CHANGE UP (ref 3.5–5.3)
POTASSIUM SERPL-SCNC: 4.4 MMOL/L — SIGNIFICANT CHANGE UP (ref 3.5–5.3)
RBC # BLD: 2.84 M/UL — LOW (ref 3.8–5.2)
RBC # FLD: 14 % — SIGNIFICANT CHANGE UP (ref 10.3–14.5)
SODIUM SERPL-SCNC: 140 MMOL/L — SIGNIFICANT CHANGE UP (ref 135–145)
SURGICAL PATHOLOGY STUDY: SIGNIFICANT CHANGE UP
WBC # BLD: 5.5 K/UL — SIGNIFICANT CHANGE UP (ref 3.8–10.5)
WBC # FLD AUTO: 5.5 K/UL — SIGNIFICANT CHANGE UP (ref 3.8–10.5)

## 2024-06-06 PROCEDURE — 99223 1ST HOSP IP/OBS HIGH 75: CPT

## 2024-06-06 PROCEDURE — 71045 X-RAY EXAM CHEST 1 VIEW: CPT | Mod: 26

## 2024-06-06 RX ORDER — INSULIN LISPRO 100/ML
VIAL (ML) SUBCUTANEOUS
Refills: 0 | Status: DISCONTINUED | OUTPATIENT
Start: 2024-06-06 | End: 2024-06-10

## 2024-06-06 RX ORDER — SODIUM CHLORIDE 9 MG/ML
10 INJECTION INTRAMUSCULAR; INTRAVENOUS; SUBCUTANEOUS
Refills: 0 | Status: DISCONTINUED | OUTPATIENT
Start: 2024-06-06 | End: 2024-06-10

## 2024-06-06 RX ORDER — DEXTROSE 50 % IN WATER 50 %
12.5 SYRINGE (ML) INTRAVENOUS ONCE
Refills: 0 | Status: DISCONTINUED | OUTPATIENT
Start: 2024-06-06 | End: 2024-06-10

## 2024-06-06 RX ORDER — DEXTROSE 10 % IN WATER 10 %
125 INTRAVENOUS SOLUTION INTRAVENOUS ONCE
Refills: 0 | Status: DISCONTINUED | OUTPATIENT
Start: 2024-06-06 | End: 2024-06-10

## 2024-06-06 RX ORDER — SODIUM CHLORIDE 9 MG/ML
1000 INJECTION, SOLUTION INTRAVENOUS
Refills: 0 | Status: DISCONTINUED | OUTPATIENT
Start: 2024-06-06 | End: 2024-06-10

## 2024-06-06 RX ORDER — DEXTROSE 50 % IN WATER 50 %
15 SYRINGE (ML) INTRAVENOUS ONCE
Refills: 0 | Status: DISCONTINUED | OUTPATIENT
Start: 2024-06-06 | End: 2024-06-10

## 2024-06-06 RX ORDER — GLUCAGON INJECTION, SOLUTION 0.5 MG/.1ML
1 INJECTION, SOLUTION SUBCUTANEOUS ONCE
Refills: 0 | Status: DISCONTINUED | OUTPATIENT
Start: 2024-06-06 | End: 2024-06-10

## 2024-06-06 RX ORDER — CHLORHEXIDINE GLUCONATE 213 G/1000ML
1 SOLUTION TOPICAL
Refills: 0 | Status: DISCONTINUED | OUTPATIENT
Start: 2024-06-06 | End: 2024-06-10

## 2024-06-06 RX ORDER — ELECTROLYTE SOLUTION,INJ
1 VIAL (ML) INTRAVENOUS
Refills: 0 | Status: DISCONTINUED | OUTPATIENT
Start: 2024-06-06 | End: 2024-06-07

## 2024-06-06 RX ORDER — DEXTROSE 50 % IN WATER 50 %
25 SYRINGE (ML) INTRAVENOUS ONCE
Refills: 0 | Status: DISCONTINUED | OUTPATIENT
Start: 2024-06-06 | End: 2024-06-10

## 2024-06-06 RX ORDER — DIPHENOXYLATE HCL/ATROPINE 2.5-.025MG
1 TABLET ORAL
Refills: 0 | Status: DISCONTINUED | OUTPATIENT
Start: 2024-06-06 | End: 2024-06-07

## 2024-06-06 RX ORDER — MAGNESIUM SULFATE 500 MG/ML
1 VIAL (ML) INJECTION ONCE
Refills: 0 | Status: COMPLETED | OUTPATIENT
Start: 2024-06-06 | End: 2024-06-06

## 2024-06-06 RX ADMIN — Medication 400 MILLIGRAM(S): at 02:33

## 2024-06-06 RX ADMIN — Medication 650 MILLIGRAM(S): at 06:09

## 2024-06-06 RX ADMIN — Medication 1 EACH: at 17:56

## 2024-06-06 RX ADMIN — Medication 650 MILLIGRAM(S): at 17:17

## 2024-06-06 RX ADMIN — Medication 400 MILLIGRAM(S): at 22:00

## 2024-06-06 RX ADMIN — Medication 100 GRAM(S): at 09:13

## 2024-06-06 RX ADMIN — Medication 400 MILLIGRAM(S): at 14:47

## 2024-06-06 RX ADMIN — Medication 1 TABLET(S): at 11:35

## 2024-06-06 RX ADMIN — Medication 400 MILLIGRAM(S): at 21:30

## 2024-06-06 RX ADMIN — Medication 650 MILLIGRAM(S): at 17:51

## 2024-06-06 RX ADMIN — Medication 650 MILLIGRAM(S): at 00:10

## 2024-06-06 RX ADMIN — Medication 1 PACKET(S): at 11:35

## 2024-06-06 RX ADMIN — Medication 4 MILLIGRAM(S): at 11:34

## 2024-06-06 RX ADMIN — HEPARIN SODIUM 5000 UNIT(S): 5000 INJECTION INTRAVENOUS; SUBCUTANEOUS at 13:50

## 2024-06-06 RX ADMIN — Medication 1 TABLET(S): at 17:16

## 2024-06-06 RX ADMIN — Medication 400 MILLIGRAM(S): at 03:03

## 2024-06-06 RX ADMIN — HEPARIN SODIUM 5000 UNIT(S): 5000 INJECTION INTRAVENOUS; SUBCUTANEOUS at 21:30

## 2024-06-06 RX ADMIN — Medication 650 MILLIGRAM(S): at 12:08

## 2024-06-06 RX ADMIN — Medication 400 MILLIGRAM(S): at 09:45

## 2024-06-06 RX ADMIN — Medication 4 MILLIGRAM(S): at 17:17

## 2024-06-06 RX ADMIN — Medication 400 MILLIGRAM(S): at 09:13

## 2024-06-06 RX ADMIN — Medication 4 MILLIGRAM(S): at 21:30

## 2024-06-06 RX ADMIN — Medication 4 MILLIGRAM(S): at 08:02

## 2024-06-06 RX ADMIN — Medication 400 MILLIGRAM(S): at 15:21

## 2024-06-06 RX ADMIN — Medication 650 MILLIGRAM(S): at 00:40

## 2024-06-06 RX ADMIN — Medication 650 MILLIGRAM(S): at 11:34

## 2024-06-06 RX ADMIN — Medication 1 EACH: at 19:19

## 2024-06-06 RX ADMIN — HEPARIN SODIUM 5000 UNIT(S): 5000 INJECTION INTRAVENOUS; SUBCUTANEOUS at 06:09

## 2024-06-06 NOTE — ADVANCED PRACTICE NURSE CONSULT - ASSESSMENT
PICC Line Insertion Note  Patient or patient representative educated about central line associated blood stream infection prevention practices.  Catheter type: 4 F,  DL Solo Picc  : Bard  Power injectable: Yes  LOT# [id number]    Informed consent obtained by covering floor team.  Procedure assisted by: MAC Jo RN  Time out was preformed, confirming the patient's first and last name, date of birth, procedure, and correct site prior to start of procedure.    Patient was placed with HOB 30 degrees. Patient placement site was prepped with chlorhexidine solution, then draped using maximum sterile barrier protection. The area was injected with 2 ml of 1% lidocaine. Using the Bard Site Rite 8, the catheter was placed using the Modified Seldinger Technique. Strict adherence to outline aseptic technique including handwashing, glove and gown, utilizing mask and cap, plus draping the patient with a sterile drape was observed. Upon completion of line placement, the insertion site was covered with a sterile CHG dressing. Pt tolerated procedure well.      All materials used for catheter insertion, including the intact guide wires, were accounted for at the end of the procedure.  Number of attempts: 1  Complications/Comments: None  Emergency Placement: No    Site: New  Anatomical Site of insertion: Right Basilic vein  Catheter size/length: 4F,   35cm  US guided Bard Single lumen power picc placed    Post procedure verification with chest Xray as per orders.

## 2024-06-06 NOTE — DIETITIAN INITIAL EVALUATION ADULT - OTHER INFO
Weight: pt reports UBW ~95-102lbs. States weight relatively stable with +/- 1-2lbs changes. Weights per chart: 103lbs (4/6/17), 96lbs (5/24). Current dosing weight is 96.5lbs.

## 2024-06-06 NOTE — CONSULT NOTE ADULT - ASSESSMENT
58yr old female with PMH of Crohn's dx in the small bowel (dx'd in 2004 Dr. Betts) s/p lap SBR 2004 for a SBO.  Patient admitted on 5/29/24 and underwent Lap ileocolic resection w/ anastamosis, DLI creation on 5/29/2024.  TPN team consulted on 6/6/24 due to high ileostomy output.      - Nutritional Assessment, patient not meeting nutritional goals enterally and would benefit from TPN for nutritional support given concern for developement protein calorie malnutrition due to high ileostomy output  - TPN plan:  start on TPN today as patient continues to be NPO;  will start at AA 60 G, Dex 140 G, NaCl 80 mEq, Na Acetate 100 mEq, NaPhos 45 mmol, KCl 60 mEq, CaGlu 10 mEq, Mg 12 mEq, TE 1 mL, MVI 10 mL at volume of 3000 mL   - TPN access:  Patient will need a dedicated double lumen PICC today for TPN to start; please obtain CXR after placement and order "okay to access central line" once placement confirmed.  Please order chlorhexidine cloth cleanses to maintain PICC.  - Recommend checking baseline nutrition parameters:  TSH, Prealbumin, Lipids, HgA1c, iCal, Mag, Phos  - Electrolyte Imbalance risk- check CMP, Mg, Phos, iCa daily, will adjust in TPN as needed  - Anemia:  Check Iron studies, B12, folate  - Hypocalcemia:  check Vitamin D and PTH  - Risk of hypertriglyceridemia:  f/u lipid profile in AM; plan to trend TG daily until stable then weekly while on TPN  - Risk of hyperglycemia:  f/u HgA1c in AM; initiate fingersticks every 6 hours with RISS coverage to monitor glucose trend once TPN starts  - Risk of refeeding syndrome:  add Thiamine 100 mg daily x 5 days in TPN bag; monitor potassium, magnesium, phosphorus, glucose and fluid balance closely  - Strict Intake and Output; weight checks three times a week  - Primary team to STOP IV fluids once TPN starts tonight  - Global care per primary team    Available on TEAMS  TPN spectra [id number] ([phone] when dialing from outside line)  M-F 8A-2P, Weekends and holidays 8/9A-12/1P  Discussed with Dr. Brian Coleman

## 2024-06-06 NOTE — PROGRESS NOTE ADULT - SUBJECTIVE AND OBJECTIVE BOX
SURGERY DAILY PROGRESS NOTE    SUBJECTIVE:     Overnight: no acute events     Patient seen and evaluated on AM rounds.   Patient otherwise denies nausea, vomiting, chest pain, shortness of breath     OBJECTIVE:  Vital Signs Last 24 Hrs  T(C): 36.7 (06 Jun 2024 05:50), Max: 36.9 (05 Jun 2024 12:44)  T(F): 98 (06 Jun 2024 05:50), Max: 98.4 (05 Jun 2024 12:44)  HR: 81 (06 Jun 2024 05:50) (75 - 89)  BP: 103/71 (06 Jun 2024 05:50) (103/71 - 118/74)  BP(mean): --  RR: 18 (06 Jun 2024 05:50) (18 - 18)  SpO2: 98% (06 Jun 2024 05:50) (97% - 99%)    Parameters below as of 06 Jun 2024 05:50  Patient On (Oxygen Delivery Method): room air      Daily     Daily   ADAMA:      Chest Tube:      NG Tube:           STANDING  acetaminophen     Tablet .. 650 milliGRAM(s) Oral every 6 hours  heparin   Injectable 5000 Unit(s) SubCutaneous every 8 hours  ibuprofen  Tablet. 400 milliGRAM(s) Oral every 6 hours  lactated ringers. 1000 milliLiter(s) (50 mL/Hr) IV Continuous <Continuous>  loperamide 4 milliGRAM(s) Oral four times a day  psyllium Powder 1 Packet(s) Oral daily    PRN  oxyCODONE    IR 10 milliGRAM(s) Oral every 6 hours PRN Severe Pain (7 - 10)  oxyCODONE    IR 5 milliGRAM(s) Oral every 6 hours PRN Moderate Pain (4 - 6)      Labs:  140  |  102  |  9  ----------------------------<  94    (06-06)  4.4   |  26  |  0.68          Ca    10.0      06-06  Mg    1.9  Phos  3.4          Urinalysis Basic - ( 06 Jun 2024 06:28 )    Color: x / Appearance: x / SG: x / pH: x  Gluc: 94 mg/dL / Ketone: x  / Bili: x / Urobili: x   Blood: x / Protein: x / Nitrite: x   Leuk Esterase: x / RBC: x / WBC x   Sq Epi: x / Non Sq Epi: x / Bacteria: x      Urinalysis Basic - ( 06 Jun 2024 06:28 )    Color: x / Appearance: x / SG: x / pH: x  Gluc: 94 mg/dL / Ketone: x  / Bili: x / Urobili: x   Blood: x / Protein: x / Nitrite: x   Leuk Esterase: x / RBC: x / WBC x   Sq Epi: x / Non Sq Epi: x / Bacteria: x          Physical Exam:  General: NAD  Respiratory: respirations non labored  Abdominal: Soft, nontense, appropriate incisional tenderness, dressings clean and dry and intact; ostomy w liquid output

## 2024-06-06 NOTE — DIETITIAN INITIAL EVALUATION ADULT - NSFNSGIIOFT_GEN_A_CORE
06-05-24 @ 07:01  -  06-06-24 @ 07:00  --------------------------------------------------------  OUT:    Ileostomy (mL): 2800 mL  Total OUT: 2800 mL    Total NET: -2800 mL

## 2024-06-06 NOTE — DIETITIAN INITIAL EVALUATION ADULT - ALTERNATE MEANS OF NUTRITION
[x] CPN (central PN)  [] PPN (peripheral PN; max 900 mOsm/L)  [] Premixed/Multi Chamber Bags     If parenteral nutrition warranted/initiated consider:   GOAL PN: 90 g amino acids, 210g dextrose, 45g SMOF lipid; to provide 1524 kcal/day (34.7kcal/kg and 2 g/kg protein based on dosing wt 43.8 kg)    Non-Protein Calories: 1164 kcal/day (26.5 kcal/kg, based on dosing wt 43.8 kg)  Dextrose Infusion Rate: 3.3 mg/kg/min (24-hour infusion)  Lipid Infusion Rate: 1.02 g/kg; 0.09 g/kg/hr (12-hour infusion)/TPN

## 2024-06-06 NOTE — DIETITIAN INITIAL EVALUATION ADULT - ORAL INTAKE PTA/DIET HISTORY
Pt reports fair appetite PTA, typically consumes a light breakfast and larger lunch and dinner. Sometimes snacks in between. NKFA. Pt denies chewing/swallowing difficulty, nausea, vomiting, diarrhea, constipation PTA.

## 2024-06-06 NOTE — CONSULT NOTE ADULT - SUBJECTIVE AND OBJECTIVE BOX
NUTRITION SUPPORT / TPN CONSULT NOTE    HPI:  58yr old female with PMH of Crohn's dx in the small bowel (dx'd in 2004 Dr. Betts) s/p lap SBR 2004 for a SBO.  Patient admitted on 5/29/24 and underwent Lap ileocolic resection w/ anastamosis, DLI creation on 5/29/2024.  Patient with high ileostomy output, TPN team consulted.     24 hour/overnight events:  Patient seen and examined at bedside, chart reviewed and events noted and I's and O's reviewed.  Patient denies chest pain, shortness of breath, nausea or vomiting, dizziness, chills at time of visit.  Patient's VSS and no other acute overnight events noted.   Patient continues on low fiber diet, on calorie count.  Patient denies any weight changes, allergies to any foods, history of CHF/diuretics use and was eating a regular diet without fruits/vegetables prior to admission.        MEDICATIONS  (STANDING):  acetaminophen     Tablet .. 650 milliGRAM(s) Oral every 6 hours  dextrose 10% Bolus 125 milliLiter(s) IV Bolus once  dextrose 5%. 1000 milliLiter(s) (100 mL/Hr) IV Continuous <Continuous>  dextrose 5%. 1000 milliLiter(s) (50 mL/Hr) IV Continuous <Continuous>  dextrose 50% Injectable 12.5 Gram(s) IV Push once  dextrose 50% Injectable 25 Gram(s) IV Push once  diphenoxylate/atropine 1 Tablet(s) Oral four times a day  glucagon  Injectable 1 milliGRAM(s) IntraMuscular once  heparin   Injectable 5000 Unit(s) SubCutaneous every 8 hours  ibuprofen  Tablet. 400 milliGRAM(s) Oral every 6 hours  insulin lispro (ADMELOG) corrective regimen sliding scale   SubCutaneous four times a day before meals  lactated ringers. 1000 milliLiter(s) (50 mL/Hr) IV Continuous <Continuous>  loperamide 4 milliGRAM(s) Oral four times a day  Parenteral Nutrition - Adult 1 Each (125 mL/Hr) TPN Continuous <Continuous>  psyllium Powder 1 Packet(s) Oral daily    MEDICATIONS  (PRN):  dextrose Oral Gel 15 Gram(s) Oral once PRN Blood Glucose LESS THAN 70 milliGRAM(s)/deciliter  oxyCODONE    IR 10 milliGRAM(s) Oral every 6 hours PRN Severe Pain (7 - 10)  oxyCODONE    IR 5 milliGRAM(s) Oral every 6 hours PRN Moderate Pain (4 - 6)      PAST MEDICAL & SURGICAL HISTORY:  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      History of Crohn's disease      Status post small bowel resection      History of colonoscopy      History of partial hysterectomy      History of bladder surgery      NVD (normal vaginal delivery)          FAMILY HISTORY:      ALLERGIES  No Known Allergies      REVIEW OF SYSTEMS  --------------------------------------------------------------------------------    Skin: No rashes  Head/Eyes/Ears/Mouth: No headache; Normal hearing; Normal vision w/o blurriness; No sinus pain/discomfort, sore throat  Respiratory: No dyspnea, cough, wheezing, hemoptysis  CV: No chest pain, PND, orthopnea  GI: No abdominal pain, diarrhea, constipation, nausea, vomiting, melena, hematochezia  : No increased frequency, dysuria, hematuria, nocturia  MSK: No joint pain/swelling; no back pain; no edema  Neuro: No dizziness/lightheadedness, weakness, seizures, numbness, tingling  Heme: No easy bruising or bleeding  Endo: No heat/cold intolerance  Psych: No significant nervousness, anxiety, stress, depression      VITALS  T(C): 36.7 (06-06-24 @ 08:55), Max: 36.9 (06-05-24 @ 12:44)  HR: 83 (06-06-24 @ 08:55) (80 - 89)  BP: 117/73 (06-06-24 @ 08:55) (103/71 - 117/73)  RR: 18 (06-06-24 @ 08:55) (18 - 18)  SpO2: 100% (06-06-24 @ 08:55) (97% - 100%)  I&O's Detail    05 Jun 2024 07:01  -  06 Jun 2024 07:00  --------------------------------------------------------  IN:    Lactated Ringers: 1200 mL    Oral Fluid: 1525 mL  Total IN: 2725 mL    OUT:    Ileostomy (mL): 2800 mL    Voided (mL): 3400 mL  Total OUT: 6200 mL    Total NET: -3475 mL      06 Jun 2024 07:01  -  06 Jun 2024 12:09  --------------------------------------------------------  IN:    IV PiggyBack: 50 mL    Oral Fluid: 200 mL  Total IN: 250 mL    OUT:    Ileostomy (mL): 400 mL    Voided (mL): 650 mL  Total OUT: 1050 mL    Total NET: -800 mL            LABS:                        8.5    5.50  )-----------( 286      ( 06 Jun 2024 06:29 )             25.7     06-06    140  |  102  |  9   ----------------------------<  94  4.4   |  26  |  0.68    Ca    10.0      06 Jun 2024 06:28  Phos  3.4     06-06  Mg     1.9     06-06      Ionized Calcium Levels:     CAPILLARY BLOOD GLUCOSE      CAPILLARY BLOOD GLUCOSE                PHYSICAL EXAM  --------------------------------------------------------------------------------    Physical Exam:  Gen: NAD, well-appearing; laying in bed   HEENT: PERRL, supple neck, no JVD  Chest: non labored breathing, equal chest expansion bilaterally; CTA b/l  CV: RRR, S1S2  Abd:  Soft, nontense, appropriate incisional tenderness, dressings clean and dry and intact; ostomy w liquid output  : No suprapubic tenderness  Ext: Warm, FROM, no edema b/l LE  Neuro: No focal deficits  Psych: Normal affect and mood  Skin: Warm, without rashes

## 2024-06-06 NOTE — DIETITIAN INITIAL EVALUATION ADULT - PERTINENT MEDS FT
MEDICATIONS  (STANDING):  acetaminophen     Tablet .. 650 milliGRAM(s) Oral every 6 hours  diphenoxylate/atropine 1 Tablet(s) Oral four times a day  heparin   Injectable 5000 Unit(s) SubCutaneous every 8 hours  ibuprofen  Tablet. 400 milliGRAM(s) Oral every 6 hours  lactated ringers. 1000 milliLiter(s) (50 mL/Hr) IV Continuous <Continuous>  loperamide 4 milliGRAM(s) Oral four times a day  psyllium Powder 1 Packet(s) Oral daily    MEDICATIONS  (PRN):  oxyCODONE    IR 10 milliGRAM(s) Oral every 6 hours PRN Severe Pain (7 - 10)  oxyCODONE    IR 5 milliGRAM(s) Oral every 6 hours PRN Moderate Pain (4 - 6)

## 2024-06-06 NOTE — PROGRESS NOTE ADULT - ASSESSMENT
58yr old female presents to [facility] for a scheduled Laparoscopic Small Bowel Resection Stricturoplasty and Laparoscopic Ileostomy Creation on 5/29/2024. PMH of Crohn's dx in the small bowel (dx'd in 2004 Dr. Betts) s/p lap SBR 2004 for a SBO. She's now s/p Laparoscopic Small Bowel Resection Stricturoplasty and Laparoscopic Ileostomy Creation on 5/29/2024. Recovering well. Spivey removed 5/31, passed TOV.     Plan:  - Advance to LRD/IVF  - Pain control with PO meds   - Ostomy teaching  - Imodium QID + Metamucil for high ostomy output   - caloric count   - Home PT  - DVT ppx    Red Surgery  [id number]   58yr old female presents to [facility] for a scheduled Laparoscopic Small Bowel Resection Stricturoplasty and Laparoscopic Ileostomy Creation on 5/29/2024. PMH of Crohn's dx in the small bowel (dx'd in 2004 Dr. Betts) s/p lap SBR 2004 for a SBO. She's now s/p Laparoscopic Small Bowel Resection Stricturoplasty and Laparoscopic Ileostomy Creation on 5/29/2024. Recovering well. Spivey removed 5/31, passed TOV.     Plan:  - Advance to LRD/IVF  - Get TPN   - Pain control with PO meds   - Ostomy teaching  - Imodium QID + Metamucil for high ostomy output   - Home PT  - DVT ppx  - dispo: poss DC trmw    Red Surgery  [id number]   58yr old female presents to [facility] for a scheduled Laparoscopic Small Bowel Resection Stricturoplasty and Laparoscopic Ileostomy Creation on 5/29/2024. PMH of Crohn's dx in the small bowel (dx'd in 2004 Dr. eBtts) s/p lap SBR 2004 for a SBO. She's now s/p Laparoscopic Small Bowel Resection Stricturoplasty and Laparoscopic Ileostomy Creation on 5/29/2024. Recovering well. Spivey removed 5/31, passed TOV.     Plan:  - Advance to LRD/IVF  - Get TPN   - Pain control with PO meds   - Ostomy teaching  - Imodium 4mg QID, Lomotil 1 tab QID, and Metamucil for high ostomy output   - Home PT  - DVT ppx  - Dispo: DCP home with TPN on Monday       Red Surgery  [id number]

## 2024-06-06 NOTE — DIETITIAN NUTRITION RISK NOTIFICATION - TREATMENT: THE FOLLOWING DIET HAS BEEN RECOMMENDED
Diet, Low Fiber (06-02-24 @ 08:58) [Active]      Parenteral Nutrition - Adult 1 Each (125 mL/Hr) TPN Continuous <Continuous>, 06-06-24 @ 17:00, 17:00, Stop order after: 1 Days

## 2024-06-06 NOTE — DIETITIAN INITIAL EVALUATION ADULT - REASON FOR ADMISSION
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    Chart reviewed, events noted. This is a "58yr old female presents to [facility] for a scheduled Laparoscopic Small Bowel Resection Stricturoplasty and Laparoscopic Ileostomy Creation on 5/29/2024. PMH of Crohn's dx in the small bowel (dx'd in 2004 Dr. Betts) s/p lap SBR 2004 for a SBO. She's now s/p Laparoscopic Small Bowel Resection Stricturoplasty and Laparoscopic Ileostomy Creation on 5/29/2024."

## 2024-06-06 NOTE — DIETITIAN INITIAL EVALUATION ADULT - EDUCATION DIETARY MODIFICATIONS
Reviewed Ileostomy nutrition therapy/food list handout. Discussed eating low-fiber foods, chewing foods well, small frequent meals high in protein & energy. Reviewed foods that may cause odors &/or gas, discussed foods that bulk stool and thin stool, and importance of adequate hydration./teach back/(R) reinforced previously met goal/verbalization

## 2024-06-06 NOTE — DIETITIAN INITIAL EVALUATION ADULT - PERTINENT LABORATORY DATA
06-06    140  |  102  |  9   ----------------------------<  94  4.4   |  26  |  0.68    Ca    10.0      06 Jun 2024 06:28  Phos  3.4     06-06  Mg     1.9     06-06

## 2024-06-06 NOTE — ADVANCED PRACTICE NURSE CONSULT - ASSESSMENT
In at bedside to f/u & continue ostomy teaching. Pt OOB in chair was observed OOB ambulated in halls. Complete pouching system changed. Loop ileostomy 1 3/8" pink & viable, functioning for liquid stool (noted 2800cc /24hr). Peristomal skin is intact. Mucocutaneous junction intact. Re-pouched w/ 2 1/4" flat skin barrier, bead of stoma paste applied at the back of skin barrier to caulk & high output pouch to cline bag. Pt somewhat tearful as stoma output remains high. Emotional support provided. Pt aware need to practice with emptying using drainable (velcro closure pouch) when appropriate. Reviewed & re demonstrated pouching opening /closure & steps for emptying .Pt receptive & expresses understanding. Pt observed as reviewed steps for change, receptive to teaching & asked appropriate questions. Reviewed ileostomy dietary modifications (including low fiber, importance of chewing foods well & importance of hydration). Briefly reviewed products info & ordering process. Pt expresses understanding & receptive to teaching.Supplies ,pattern left at bedside. Emotional support provided throughout visit.

## 2024-06-06 NOTE — DIETITIAN INITIAL EVALUATION ADULT - ENERGY INTAKE
In-house pt reports overall good PO intake, reports eating most of dinner last night. Awaiting breakfast during visit. Calorie count ordered 6/5, RD to follow up with results as able. Pt noted with high ostomy output, order for imodium, metamucil, lomotil. Plan for TPN initiation.  Fair (50-75%)

## 2024-06-06 NOTE — DIETITIAN INITIAL EVALUATION ADULT - ADD RECOMMEND
1) Continue current low fiber diet as tolerated. 2) TPN, per TPN team/nutrition assessment. 3) bowel regimen per team. 4) Underweight alert placed in EMR

## 2024-06-07 LAB
24R-OH-CALCIDIOL SERPL-MCNC: 42.6 NG/ML — SIGNIFICANT CHANGE UP (ref 30–80)
A1C WITH ESTIMATED AVERAGE GLUCOSE RESULT: 4.9 % — SIGNIFICANT CHANGE UP (ref 4–5.6)
ALBUMIN SERPL ELPH-MCNC: 3.1 G/DL — LOW (ref 3.3–5)
ALP SERPL-CCNC: 219 U/L — HIGH (ref 40–120)
ALT FLD-CCNC: 25 U/L — SIGNIFICANT CHANGE UP (ref 10–45)
ANION GAP SERPL CALC-SCNC: 10 MMOL/L — SIGNIFICANT CHANGE UP (ref 5–17)
AST SERPL-CCNC: 20 U/L — SIGNIFICANT CHANGE UP (ref 10–40)
BASOPHILS # BLD AUTO: 0.02 K/UL — SIGNIFICANT CHANGE UP (ref 0–0.2)
BASOPHILS NFR BLD AUTO: 0.3 % — SIGNIFICANT CHANGE UP (ref 0–2)
BILIRUB SERPL-MCNC: 0.2 MG/DL — SIGNIFICANT CHANGE UP (ref 0.2–1.2)
BUN SERPL-MCNC: 12 MG/DL — SIGNIFICANT CHANGE UP (ref 7–23)
CA-I BLD-SCNC: 1.33 MMOL/L — SIGNIFICANT CHANGE UP (ref 1.15–1.33)
CALCIUM SERPL-MCNC: 10.1 MG/DL — SIGNIFICANT CHANGE UP (ref 8.4–10.5)
CALCIUM SERPL-MCNC: 9.5 MG/DL — SIGNIFICANT CHANGE UP (ref 8.4–10.5)
CHLORIDE SERPL-SCNC: 103 MMOL/L — SIGNIFICANT CHANGE UP (ref 96–108)
CHOLEST SERPL-MCNC: 103 MG/DL — SIGNIFICANT CHANGE UP
CO2 SERPL-SCNC: 26 MMOL/L — SIGNIFICANT CHANGE UP (ref 22–31)
CREAT SERPL-MCNC: 0.57 MG/DL — SIGNIFICANT CHANGE UP (ref 0.5–1.3)
EGFR: 107 ML/MIN/1.73M2 — SIGNIFICANT CHANGE UP
EOSINOPHIL # BLD AUTO: 0.23 K/UL — SIGNIFICANT CHANGE UP (ref 0–0.5)
EOSINOPHIL NFR BLD AUTO: 3.5 % — SIGNIFICANT CHANGE UP (ref 0–6)
ESTIMATED AVERAGE GLUCOSE: 94 MG/DL — SIGNIFICANT CHANGE UP (ref 68–114)
FERRITIN SERPL-MCNC: 100 NG/ML — SIGNIFICANT CHANGE UP (ref 13–330)
FOLATE SERPL-MCNC: 7.1 NG/ML — SIGNIFICANT CHANGE UP
GLUCOSE BLDC GLUCOMTR-MCNC: 111 MG/DL — HIGH (ref 70–99)
GLUCOSE BLDC GLUCOMTR-MCNC: 129 MG/DL — HIGH (ref 70–99)
GLUCOSE BLDC GLUCOMTR-MCNC: 137 MG/DL — HIGH (ref 70–99)
GLUCOSE BLDC GLUCOMTR-MCNC: 97 MG/DL — SIGNIFICANT CHANGE UP (ref 70–99)
GLUCOSE SERPL-MCNC: 93 MG/DL — SIGNIFICANT CHANGE UP (ref 70–99)
HCT VFR BLD CALC: 24.8 % — LOW (ref 34.5–45)
HDLC SERPL-MCNC: 40 MG/DL — LOW
HGB BLD-MCNC: 8.1 G/DL — LOW (ref 11.5–15.5)
IMM GRANULOCYTES NFR BLD AUTO: 1.8 % — HIGH (ref 0–0.9)
IRON SATN MFR SERPL: 16 % — SIGNIFICANT CHANGE UP (ref 14–50)
IRON SATN MFR SERPL: 43 UG/DL — SIGNIFICANT CHANGE UP (ref 30–160)
LIPID PNL WITH DIRECT LDL SERPL: 36 MG/DL — SIGNIFICANT CHANGE UP
LYMPHOCYTES # BLD AUTO: 0.46 K/UL — LOW (ref 1–3.3)
LYMPHOCYTES # BLD AUTO: 6.9 % — LOW (ref 13–44)
MAGNESIUM SERPL-MCNC: 2.3 MG/DL — SIGNIFICANT CHANGE UP (ref 1.6–2.6)
MCHC RBC-ENTMCNC: 29.9 PG — SIGNIFICANT CHANGE UP (ref 27–34)
MCHC RBC-ENTMCNC: 32.7 GM/DL — SIGNIFICANT CHANGE UP (ref 32–36)
MCV RBC AUTO: 91.5 FL — SIGNIFICANT CHANGE UP (ref 80–100)
MONOCYTES # BLD AUTO: 0.59 K/UL — SIGNIFICANT CHANGE UP (ref 0–0.9)
MONOCYTES NFR BLD AUTO: 8.9 % — SIGNIFICANT CHANGE UP (ref 2–14)
NEUTROPHILS # BLD AUTO: 5.2 K/UL — SIGNIFICANT CHANGE UP (ref 1.8–7.4)
NEUTROPHILS NFR BLD AUTO: 78.6 % — HIGH (ref 43–77)
NON HDL CHOLESTEROL: 63 MG/DL — SIGNIFICANT CHANGE UP
NRBC # BLD: 0 /100 WBCS — SIGNIFICANT CHANGE UP (ref 0–0)
PHOSPHATE SERPL-MCNC: 3.5 MG/DL — SIGNIFICANT CHANGE UP (ref 2.5–4.5)
PLATELET # BLD AUTO: 300 K/UL — SIGNIFICANT CHANGE UP (ref 150–400)
POTASSIUM SERPL-MCNC: 4.7 MMOL/L — SIGNIFICANT CHANGE UP (ref 3.5–5.3)
POTASSIUM SERPL-SCNC: 4.7 MMOL/L — SIGNIFICANT CHANGE UP (ref 3.5–5.3)
PREALB SERPL-MCNC: 25 MG/DL — SIGNIFICANT CHANGE UP (ref 20–40)
PROT SERPL-MCNC: 5.7 G/DL — LOW (ref 6–8.3)
PTH-INTACT FLD-MCNC: 114 PG/ML — HIGH (ref 15–65)
RBC # BLD: 2.71 M/UL — LOW (ref 3.8–5.2)
RBC # BLD: 2.71 M/UL — LOW (ref 3.8–5.2)
RBC # FLD: 13.8 % — SIGNIFICANT CHANGE UP (ref 10.3–14.5)
RETICS #: 150.7 K/UL — HIGH (ref 25–125)
RETICS/RBC NFR: 5.6 % — HIGH (ref 0.5–2.5)
SODIUM SERPL-SCNC: 139 MMOL/L — SIGNIFICANT CHANGE UP (ref 135–145)
TIBC SERPL-MCNC: 277 UG/DL — SIGNIFICANT CHANGE UP (ref 220–430)
TRANSFERRIN SERPL-MCNC: 213 MG/DL — SIGNIFICANT CHANGE UP (ref 200–360)
TRIGL SERPL-MCNC: 155 MG/DL — HIGH
UIBC SERPL-MCNC: 234 UG/DL — SIGNIFICANT CHANGE UP (ref 110–370)
VIT B12 SERPL-MCNC: >2000 PG/ML — HIGH (ref 232–1245)
WBC # BLD: 6.62 K/UL — SIGNIFICANT CHANGE UP (ref 3.8–10.5)
WBC # FLD AUTO: 6.62 K/UL — SIGNIFICANT CHANGE UP (ref 3.8–10.5)

## 2024-06-07 PROCEDURE — 99232 SBSQ HOSP IP/OBS MODERATE 35: CPT

## 2024-06-07 RX ORDER — ELECTROLYTE SOLUTION,INJ
1 VIAL (ML) INTRAVENOUS
Refills: 0 | Status: DISCONTINUED | OUTPATIENT
Start: 2024-06-07 | End: 2024-06-08

## 2024-06-07 RX ORDER — DIPHENOXYLATE HCL/ATROPINE 2.5-.025MG
2 TABLET ORAL
Refills: 0 | Status: DISCONTINUED | OUTPATIENT
Start: 2024-06-07 | End: 2024-06-10

## 2024-06-07 RX ORDER — I.V. FAT EMULSION 20 G/100ML
8.3 EMULSION INTRAVENOUS
Qty: 20 | Refills: 0 | Status: DISCONTINUED | OUTPATIENT
Start: 2024-06-07 | End: 2024-06-08

## 2024-06-07 RX ADMIN — Medication 650 MILLIGRAM(S): at 13:24

## 2024-06-07 RX ADMIN — Medication 4 MILLIGRAM(S): at 17:48

## 2024-06-07 RX ADMIN — Medication 4 MILLIGRAM(S): at 21:10

## 2024-06-07 RX ADMIN — Medication 1 TABLET(S): at 08:39

## 2024-06-07 RX ADMIN — Medication 2 TABLET(S): at 17:48

## 2024-06-07 RX ADMIN — Medication 400 MILLIGRAM(S): at 02:34

## 2024-06-07 RX ADMIN — I.V. FAT EMULSION 8.3 ML/HR: 20 EMULSION INTRAVENOUS at 18:15

## 2024-06-07 RX ADMIN — HEPARIN SODIUM 5000 UNIT(S): 5000 INJECTION INTRAVENOUS; SUBCUTANEOUS at 21:11

## 2024-06-07 RX ADMIN — Medication 1 EACH: at 07:08

## 2024-06-07 RX ADMIN — Medication 650 MILLIGRAM(S): at 23:28

## 2024-06-07 RX ADMIN — Medication 650 MILLIGRAM(S): at 00:38

## 2024-06-07 RX ADMIN — Medication 400 MILLIGRAM(S): at 21:40

## 2024-06-07 RX ADMIN — Medication 400 MILLIGRAM(S): at 14:55

## 2024-06-07 RX ADMIN — Medication 650 MILLIGRAM(S): at 06:39

## 2024-06-07 RX ADMIN — Medication 4 MILLIGRAM(S): at 12:53

## 2024-06-07 RX ADMIN — I.V. FAT EMULSION 8.3 ML/HR: 20 EMULSION INTRAVENOUS at 19:11

## 2024-06-07 RX ADMIN — Medication 1 EACH: at 18:16

## 2024-06-07 RX ADMIN — Medication 650 MILLIGRAM(S): at 00:08

## 2024-06-07 RX ADMIN — Medication 400 MILLIGRAM(S): at 03:04

## 2024-06-07 RX ADMIN — Medication 650 MILLIGRAM(S): at 06:16

## 2024-06-07 RX ADMIN — Medication 1 EACH: at 19:11

## 2024-06-07 RX ADMIN — Medication 2 TABLET(S): at 12:53

## 2024-06-07 RX ADMIN — Medication 2 TABLET(S): at 23:28

## 2024-06-07 RX ADMIN — CHLORHEXIDINE GLUCONATE 1 APPLICATION(S): 213 SOLUTION TOPICAL at 08:43

## 2024-06-07 RX ADMIN — Medication 1 TABLET(S): at 00:07

## 2024-06-07 RX ADMIN — Medication 650 MILLIGRAM(S): at 23:58

## 2024-06-07 RX ADMIN — HEPARIN SODIUM 5000 UNIT(S): 5000 INJECTION INTRAVENOUS; SUBCUTANEOUS at 06:16

## 2024-06-07 RX ADMIN — Medication 400 MILLIGRAM(S): at 09:15

## 2024-06-07 RX ADMIN — Medication 400 MILLIGRAM(S): at 08:39

## 2024-06-07 RX ADMIN — Medication 650 MILLIGRAM(S): at 12:53

## 2024-06-07 RX ADMIN — Medication 400 MILLIGRAM(S): at 21:10

## 2024-06-07 RX ADMIN — Medication 400 MILLIGRAM(S): at 14:24

## 2024-06-07 RX ADMIN — HEPARIN SODIUM 5000 UNIT(S): 5000 INJECTION INTRAVENOUS; SUBCUTANEOUS at 14:24

## 2024-06-07 RX ADMIN — Medication 4 MILLIGRAM(S): at 08:39

## 2024-06-07 RX ADMIN — Medication 1 PACKET(S): at 12:53

## 2024-06-07 NOTE — ADVANCED PRACTICE NURSE CONSULT - ASSESSMENT
Chart reviewed & events noted to date. In to see pt earlier this am to continue stoma teaching & provided emotional support & encouragement. Pt became slightly teary eyed & she is now on TPN & "might need it for 2 months". Pt verbalized understanding & rationale for TPN but was hopeful ileostomy output would "thicken". Pt verbalized concerns & fears as this CWOCN actively listened & offered support regarding pouching & exposure of DL PICC. Provided pt w/options & products to help conceal pouching system (i.e wraps/belts and arm sleeve for PICC line). Provided list of companies as well as demonstrated products. CWOCN provided much emotional support, active listening, & assured pt of ongoing support & teaching. Pt appreciative of time & support.   Pouch seal intact, stoma pink & viable functioning +flatus +kingsley yellow output (pasty at os). Pt endorses "burping" pouch independently. Encourage pt to disconnect pouch during day & practice emptying pouch & then reconnecting pouch to drainage bag at night or if "resting". Pt agrees w/plan of care. Will f/u on Mon am for routine complete pouching system change.

## 2024-06-07 NOTE — PROVIDER CONTACT NOTE (OTHER) - ASSESSMENT
Pt A&Ox4. All VS as charted. Pt not showing any signs of discomfort or distress.  Pt educated on the importance of medication and the risks of blood clots.
Pt AOx4. All VSS. Abd soft & nondistended. Pt denies pain or distress in moment. Pt educated on indication of pain medication regimen & standing tylenol. Pt endorses understanding, is still refusing.

## 2024-06-07 NOTE — PROGRESS NOTE ADULT - SUBJECTIVE AND OBJECTIVE BOX
[facility] NUTRITION SUPPORT-- FOLLOW UP NOTE      24 hour events/subjective:  Patient seen and examined at bedside, chart reviewed and events noted and I's and O's reviewed.  Patient denies chest pain, shortness of breath, nausea or vomiting, dizziness, chills at time of visit.  Ileostomy output remains elevated.  Patient's VSS and no other acute overnight events noted.   Patient tolerated TPN intiation and glucose trend is within appropriate range.      ROS:  Except as noted above, all other systems reviewed and are negative     Diet:  Diet, Low Fiber (06-02-24 @ 08:58)      PAST HISTORY  --------------------------------------------------------------------------------  PAST MEDICAL & SURGICAL HISTORY:  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      History of Crohn's disease      Status post small bowel resection      History of colonoscopy      History of partial hysterectomy      History of bladder surgery      NVD (normal vaginal delivery)        No significant changes to PMH, PSH, FHx, SHx, unless otherwise noted    ALLERGIES & MEDICATIONS  --------------------------------------------------------------------------------  Allergies    No Known Allergies    Intolerances      Standing Inpatient Medications  acetaminophen     Tablet .. 650 milliGRAM(s) Oral every 6 hours  chlorhexidine 4% Liquid 1 Application(s) Topical <User Schedule>  dextrose 10% Bolus 125 milliLiter(s) IV Bolus once  dextrose 5%. 1000 milliLiter(s) IV Continuous <Continuous>  dextrose 5%. 1000 milliLiter(s) IV Continuous <Continuous>  dextrose 50% Injectable 12.5 Gram(s) IV Push once  dextrose 50% Injectable 25 Gram(s) IV Push once  diphenoxylate/atropine 2 Tablet(s) Oral four times a day  glucagon  Injectable 1 milliGRAM(s) IntraMuscular once  heparin   Injectable 5000 Unit(s) SubCutaneous every 8 hours  ibuprofen  Tablet. 400 milliGRAM(s) Oral every 6 hours  insulin lispro (ADMELOG) corrective regimen sliding scale   SubCutaneous four times a day before meals  lactated ringers. 1000 milliLiter(s) IV Continuous <Continuous>  lipid, fat emulsion (Fish Oil and Plant Based) 20% Infusion 8.3 mL/Hr IV Continuous <Continuous>  loperamide 4 milliGRAM(s) Oral four times a day  Parenteral Nutrition - Adult 1 Each TPN Continuous <Continuous>  Parenteral Nutrition - Adult 1 Each TPN Continuous <Continuous>  psyllium Powder 1 Packet(s) Oral daily    PRN Inpatient Medications  dextrose Oral Gel 15 Gram(s) Oral once PRN  oxyCODONE    IR 10 milliGRAM(s) Oral every 6 hours PRN  oxyCODONE    IR 5 milliGRAM(s) Oral every 6 hours PRN  sodium chloride 0.9% lock flush 10 milliLiter(s) IV Push every 1 hour PRN        VITALS/PHYSICAL EXAM  --------------------------------------------------------------------------------  T(C): 36.9 (06-07-24 @ 09:31), Max: 37.1 (06-06-24 @ 14:27)  HR: 89 (06-07-24 @ 09:31) (72 - 89)  BP: 118/80 (06-07-24 @ 09:31) (111/74 - 135/83)  RR: 18 (06-07-24 @ 09:31) (18 - 18)  SpO2: 100% (06-07-24 @ 09:31) (96% - 100%)  Wt(kg): --      06-06-24 @ 07:01  -  06-07-24 @ 07:00  --------------------------------------------------------  IN: 1765 mL / OUT: 5050 mL / NET: -7712 mL    06-07-24 @ 07:01  -  06-07-24 @ 12:38  --------------------------------------------------------  IN: 240 mL / OUT: 950 mL / NET: -710 mL      I&O's Detail    06 Jun 2024 07:01  -  07 Jun 2024 07:00  --------------------------------------------------------  IN:    IV PiggyBack: 50 mL    Lactated Ringers: 600 mL    Oral Fluid: 990 mL    TPN (Total Parenteral Nutrition): 125 mL  Total IN: 1765 mL    OUT:    Ileostomy (mL): 2450 mL    Voided (mL): 2600 mL  Total OUT: 5050 mL    Total NET: -3285 mL      07 Jun 2024 07:01  -  07 Jun 2024 12:38  --------------------------------------------------------  IN:    Oral Fluid: 240 mL  Total IN: 240 mL    OUT:    Ileostomy (mL): 250 mL    Voided (mL): 700 mL  Total OUT: 950 mL    Total NET: -710 mL          Physical Exam:  Gen: NAD, well-appearing; laying in bed   HEENT: PERRL, supple neck, no JVD  Chest: non labored breathing, equal chest expansion bilaterally  Abd:  Soft, nontense, appropriate incisional tenderness, dressings clean and dry and intact; ostomy w liquid output  Ext: Warm, FROM, no edema b/l LE  Neuro: No focal deficits  Psych: Normal affect and mood  Skin: Warm, without rashes           LABS/STUDIES  --------------------------------------------------------------------------------              8.1    6.62  >-----------<  300      [06-07-24 @ 06:36]              24.8     139  |  103  |  12  ----------------------------<  93      [06-07-24 @ 06:33]  4.7   |  26  |  0.57        Ca     10.1     [06-07-24 @ 06:33]      iCa    1.33     [06-07 @ 06:31]      Mg     2.3     [06-07-24 @ 06:33]      Phos  3.5     [06-07-24 @ 06:33]    TPro  5.7  /  Alb  3.1  /  TBili  0.2  /  DBili  x   /  AST  20  /  ALT  25  /  AlkPhos  219  [06-07-24 @ 06:33]          Ca ionized  Creatinine Trend:  POC glucoseGlucose: 93 mg/dL (06-07-24 @ 06:33)  CAPILLARY BLOOD GLUCOSE      POCT Blood Glucose.: 137 mg/dL (07 Jun 2024 11:35)  POCT Blood Glucose.: 111 mg/dL (07 Jun 2024 08:45)  POCT Blood Glucose.: 118 mg/dL (06 Jun 2024 21:29)  POCT Blood Glucose.: 117 mg/dL (06 Jun 2024 17:38)    PrealbuminPrealbumin, Serum: 25 mg/dL (06-07-24 @ 06:36)    Triglycerides

## 2024-06-07 NOTE — PROGRESS NOTE ADULT - ASSESSMENT
58yr old female with PMH of Crohn's dx in the small bowel (dx'd in 2004 Dr. Betts) s/p lap SBR 2004 for a SBO.  Patient admitted on 5/29/24 and underwent Lap ileocolic resection w/ anastamosis, DLI creation on 5/29/2024.  TPN team consulted on 6/6/24 due to high ileostomy output.      GOAL PN: 90 g amino acids, 210g dextrose, 45g SMOF lipid; to provide 1524 kcal/day (34.7kcal/kg and 2 g/kg protein based on dosing wt 43.8 kg)    - Nutritional Assessment, patient not meeting nutritional goals enterally and would benefit from TPN for nutritional support given concern for developement protein calorie malnutrition due to high ileostomy output; Prealbumin 25 mg/dL - trend weekly   - TPN plan:  Increase to goal AA/Dex today and add 20 G SMOF; NaCl 80 mEq, Na Acetate 100 mEq, NaPhos 45 mmol, CaGlu 10 mEq, Mg 12 mEq, TE 1 mL, MVI 10 mL at volume of 3000 mL over 24 hours; decrease KCl to 40 mEq today; plan to compress over weekend in anticipation of d/c home with TPN on Monday  - TPN access:  DL PICC in place; maintain per protocol and keep one port reserved for TPN only   - Electrolyte Imbalance risk- check CMP, Mg, Phos, iCa daily, will adjust in TPN as needed  - Anemia:  Iron studies, B12, folate reviewed; trend h/h   - Hypocalcemia:  f/u Vitamin D and PTH  - Risk of hypertriglyceridemia:  mg/dL on 6/7; plan to trend TG closely while on TPN  - Risk of hyperglycemia: HgA1c 4.9%; continue fingersticks every 6 hours with RISS coverage to monitor glucose trend; no RI in TPN  - Risk of refeeding syndrome:  continue Thiamine 100 mg daily x 5 days in TPN bag to help decrease risk of refeeding syndrome; monitor potassium, magnesium, phosphorus, glucose and fluid balance closely  - Strict Intake and Output; weight checks three times a week  - Global care per primary team    Available on TEAMS  TPN spectra [id number] ([phone] when dialing from outside line)  M-F 8A-2P, Weekends and holidays 8/9A-12/1P  Discussed with Dr. Brian Coleman

## 2024-06-07 NOTE — PROGRESS NOTE ADULT - SUBJECTIVE AND OBJECTIVE BOX
SURGERY DAILY PROGRESS NOTE    SUBJECTIVE:     Overnight: no acute events     Patient seen and evaluated on AM rounds.   Patient otherwise denies nausea, vomiting, chest pain, shortness of breath     OBJECTIVE:  Vital Signs Last 24 Hrs  T(C): 36.7 (07 Jun 2024 04:33), Max: 37.1 (06 Jun 2024 14:27)  T(F): 98.1 (07 Jun 2024 04:33), Max: 98.8 (06 Jun 2024 14:27)  HR: 83 (07 Jun 2024 04:33) (72 - 85)  BP: 113/72 (07 Jun 2024 04:33) (111/74 - 135/83)  BP(mean): --  RR: 18 (07 Jun 2024 04:33) (18 - 18)  SpO2: 98% (07 Jun 2024 04:33) (96% - 100%)    Parameters below as of 07 Jun 2024 04:33  Patient On (Oxygen Delivery Method): room air      Daily     Daily   ADAMA:      Chest Tube:      NG Tube:           STANDING  acetaminophen     Tablet .. 650 milliGRAM(s) Oral every 6 hours  chlorhexidine 4% Liquid 1 Application(s) Topical <User Schedule>  dextrose 10% Bolus 125 milliLiter(s) IV Bolus once  dextrose 5%. 1000 milliLiter(s) (100 mL/Hr) IV Continuous <Continuous>  dextrose 5%. 1000 milliLiter(s) (50 mL/Hr) IV Continuous <Continuous>  dextrose 50% Injectable 25 Gram(s) IV Push once  dextrose 50% Injectable 12.5 Gram(s) IV Push once  diphenoxylate/atropine 1 Tablet(s) Oral four times a day  glucagon  Injectable 1 milliGRAM(s) IntraMuscular once  heparin   Injectable 5000 Unit(s) SubCutaneous every 8 hours  ibuprofen  Tablet. 400 milliGRAM(s) Oral every 6 hours  insulin lispro (ADMELOG) corrective regimen sliding scale   SubCutaneous four times a day before meals  lactated ringers. 1000 milliLiter(s) (50 mL/Hr) IV Continuous <Continuous>  loperamide 4 milliGRAM(s) Oral four times a day  Parenteral Nutrition - Adult 1 Each (125 mL/Hr) TPN Continuous <Continuous>  psyllium Powder 1 Packet(s) Oral daily    PRN  dextrose Oral Gel 15 Gram(s) Oral once PRN Blood Glucose LESS THAN 70 milliGRAM(s)/deciliter  oxyCODONE    IR 5 milliGRAM(s) Oral every 6 hours PRN Moderate Pain (4 - 6)  oxyCODONE    IR 10 milliGRAM(s) Oral every 6 hours PRN Severe Pain (7 - 10)  sodium chloride 0.9% lock flush 10 milliLiter(s) IV Push every 1 hour PRN Pre/post blood products, medications, blood draw, and to maintain line patency      Labs:  139  |  103  |  12  ----------------------------<  93    (06-07)  4.7   |  26  |  0.57          Ca    10.1      06-07  Mg    2.3  Phos  3.5          Urinalysis Basic - ( 07 Jun 2024 06:33 )    Color: x / Appearance: x / SG: x / pH: x  Gluc: 93 mg/dL / Ketone: x  / Bili: x / Urobili: x   Blood: x / Protein: x / Nitrite: x   Leuk Esterase: x / RBC: x / WBC x   Sq Epi: x / Non Sq Epi: x / Bacteria: x      Urinalysis Basic - ( 07 Jun 2024 06:33 )    Color: x / Appearance: x / SG: x / pH: x  Gluc: 93 mg/dL / Ketone: x  / Bili: x / Urobili: x   Blood: x / Protein: x / Nitrite: x   Leuk Esterase: x / RBC: x / WBC x   Sq Epi: x / Non Sq Epi: x / Bacteria: x          Physical Exam:  General: NAD  Respiratory: respirations non labored  Abdominal: Soft, nontense, appropriate incisional tenderness, dressings clean and dry and intact; ostomy w liquid output

## 2024-06-07 NOTE — PROVIDER CONTACT NOTE (OTHER) - RECOMMENDATIONS
Pt educated, per pt "pain relief is adequate enough w the motrin."
MD made aware. No further actions needed.

## 2024-06-07 NOTE — PROGRESS NOTE ADULT - ASSESSMENT
58yr old female presents to [facility] for a scheduled Laparoscopic Small Bowel Resection Stricturoplasty and Laparoscopic Ileostomy Creation on 5/29/2024. PMH of Crohn's dx in the small bowel (dx'd in 2004 Dr. Betts) s/p lap SBR 2004 for a SBO. She's now s/p Laparoscopic Small Bowel Resection Stricturoplasty and Laparoscopic Ileostomy Creation on 5/29/2024. Recovering well. Spivey removed 5/31, passed TOV.     Plan:  - Advance to LRD + TPN  - Pain control with PO meds   - Ostomy teaching  - Imodium 4mg QID, Lomotil 1 tab QID, and Metamucil for high ostomy output   - Home PT  - DVT ppx  - Dispo: DCP home with TPN on Monday       Red Surgery  [id number]   58yr old female presents to [facility] for a scheduled Laparoscopic Small Bowel Resection Stricturoplasty and Laparoscopic Ileostomy Creation on 5/29/2024. PMH of Crohn's dx in the small bowel (dx'd in 2004 Dr. Betts) s/p lap SBR 2004 for a SBO. She's now s/p Laparoscopic Small Bowel Resection Stricturoplasty and Laparoscopic Ileostomy Creation on 5/29/2024. Recovering well. Spivey removed 5/31, passed TOV. Diet advanced to LRD; however, patient continued to have high ostomy output. PICC placed 6/6 for TPN.     Plan:  - Advance to LRD + TPN  - Pain control with PO meds   - Ostomy teaching  - Imodium 4mg QID, Lomotil 1 tab QID, and Metamucil for high ostomy output   - Home PT  - DVT ppx  - Dispo: DCP home with TPN on Monday       Red Surgery  [id number]

## 2024-06-08 LAB
ALBUMIN SERPL ELPH-MCNC: 3.4 G/DL — SIGNIFICANT CHANGE UP (ref 3.3–5)
ALP SERPL-CCNC: 188 U/L — HIGH (ref 40–120)
ALT FLD-CCNC: 22 U/L — SIGNIFICANT CHANGE UP (ref 10–45)
ANION GAP SERPL CALC-SCNC: 10 MMOL/L — SIGNIFICANT CHANGE UP (ref 5–17)
AST SERPL-CCNC: 16 U/L — SIGNIFICANT CHANGE UP (ref 10–40)
BASOPHILS # BLD AUTO: 0.04 K/UL — SIGNIFICANT CHANGE UP (ref 0–0.2)
BASOPHILS NFR BLD AUTO: 0.6 % — SIGNIFICANT CHANGE UP (ref 0–2)
BILIRUB SERPL-MCNC: 0.2 MG/DL — SIGNIFICANT CHANGE UP (ref 0.2–1.2)
BUN SERPL-MCNC: 18 MG/DL — SIGNIFICANT CHANGE UP (ref 7–23)
CA-I BLD-SCNC: 1.32 MMOL/L — SIGNIFICANT CHANGE UP (ref 1.15–1.33)
CALCIUM SERPL-MCNC: 9.8 MG/DL — SIGNIFICANT CHANGE UP (ref 8.4–10.5)
CHLORIDE SERPL-SCNC: 104 MMOL/L — SIGNIFICANT CHANGE UP (ref 96–108)
CO2 SERPL-SCNC: 26 MMOL/L — SIGNIFICANT CHANGE UP (ref 22–31)
CREAT SERPL-MCNC: 0.6 MG/DL — SIGNIFICANT CHANGE UP (ref 0.5–1.3)
EGFR: 105 ML/MIN/1.73M2 — SIGNIFICANT CHANGE UP
EOSINOPHIL # BLD AUTO: 0.23 K/UL — SIGNIFICANT CHANGE UP (ref 0–0.5)
EOSINOPHIL NFR BLD AUTO: 3.7 % — SIGNIFICANT CHANGE UP (ref 0–6)
GLUCOSE BLDC GLUCOMTR-MCNC: 102 MG/DL — HIGH (ref 70–99)
GLUCOSE BLDC GLUCOMTR-MCNC: 104 MG/DL — HIGH (ref 70–99)
GLUCOSE BLDC GLUCOMTR-MCNC: 119 MG/DL — HIGH (ref 70–99)
GLUCOSE BLDC GLUCOMTR-MCNC: 130 MG/DL — HIGH (ref 70–99)
GLUCOSE SERPL-MCNC: 88 MG/DL — SIGNIFICANT CHANGE UP (ref 70–99)
HCT VFR BLD CALC: 25 % — LOW (ref 34.5–45)
HGB BLD-MCNC: 8 G/DL — LOW (ref 11.5–15.5)
IMM GRANULOCYTES NFR BLD AUTO: 3.6 % — HIGH (ref 0–0.9)
LYMPHOCYTES # BLD AUTO: 0.52 K/UL — LOW (ref 1–3.3)
LYMPHOCYTES # BLD AUTO: 8.4 % — LOW (ref 13–44)
MAGNESIUM SERPL-MCNC: 2.3 MG/DL — SIGNIFICANT CHANGE UP (ref 1.6–2.6)
MCHC RBC-ENTMCNC: 29.9 PG — SIGNIFICANT CHANGE UP (ref 27–34)
MCHC RBC-ENTMCNC: 32 GM/DL — SIGNIFICANT CHANGE UP (ref 32–36)
MCV RBC AUTO: 93.3 FL — SIGNIFICANT CHANGE UP (ref 80–100)
MONOCYTES # BLD AUTO: 0.56 K/UL — SIGNIFICANT CHANGE UP (ref 0–0.9)
MONOCYTES NFR BLD AUTO: 9.1 % — SIGNIFICANT CHANGE UP (ref 2–14)
NEUTROPHILS # BLD AUTO: 4.6 K/UL — SIGNIFICANT CHANGE UP (ref 1.8–7.4)
NEUTROPHILS NFR BLD AUTO: 74.6 % — SIGNIFICANT CHANGE UP (ref 43–77)
NRBC # BLD: 0 /100 WBCS — SIGNIFICANT CHANGE UP (ref 0–0)
PHOSPHATE SERPL-MCNC: 3.4 MG/DL — SIGNIFICANT CHANGE UP (ref 2.5–4.5)
PLATELET # BLD AUTO: 320 K/UL — SIGNIFICANT CHANGE UP (ref 150–400)
POTASSIUM SERPL-MCNC: 4.8 MMOL/L — SIGNIFICANT CHANGE UP (ref 3.5–5.3)
POTASSIUM SERPL-SCNC: 4.8 MMOL/L — SIGNIFICANT CHANGE UP (ref 3.5–5.3)
PROT SERPL-MCNC: 5.7 G/DL — LOW (ref 6–8.3)
RBC # BLD: 2.68 M/UL — LOW (ref 3.8–5.2)
RBC # FLD: 13.9 % — SIGNIFICANT CHANGE UP (ref 10.3–14.5)
SODIUM SERPL-SCNC: 140 MMOL/L — SIGNIFICANT CHANGE UP (ref 135–145)
TRIGL SERPL-MCNC: 172 MG/DL — HIGH
WBC # BLD: 6.17 K/UL — SIGNIFICANT CHANGE UP (ref 3.8–10.5)
WBC # FLD AUTO: 6.17 K/UL — SIGNIFICANT CHANGE UP (ref 3.8–10.5)

## 2024-06-08 PROCEDURE — 99232 SBSQ HOSP IP/OBS MODERATE 35: CPT

## 2024-06-08 RX ORDER — ELECTROLYTE SOLUTION,INJ
1 VIAL (ML) INTRAVENOUS
Refills: 0 | Status: DISCONTINUED | OUTPATIENT
Start: 2024-06-08 | End: 2024-06-08

## 2024-06-08 RX ORDER — I.V. FAT EMULSION 20 G/100ML
18.8 EMULSION INTRAVENOUS
Qty: 45 | Refills: 0 | Status: DISCONTINUED | OUTPATIENT
Start: 2024-06-08 | End: 2024-06-09

## 2024-06-08 RX ADMIN — Medication 400 MILLIGRAM(S): at 15:08

## 2024-06-08 RX ADMIN — Medication 650 MILLIGRAM(S): at 11:38

## 2024-06-08 RX ADMIN — HEPARIN SODIUM 5000 UNIT(S): 5000 INJECTION INTRAVENOUS; SUBCUTANEOUS at 05:06

## 2024-06-08 RX ADMIN — Medication 4 MILLIGRAM(S): at 11:38

## 2024-06-08 RX ADMIN — Medication 650 MILLIGRAM(S): at 18:06

## 2024-06-08 RX ADMIN — I.V. FAT EMULSION 18.8 ML/HR: 20 EMULSION INTRAVENOUS at 18:59

## 2024-06-08 RX ADMIN — Medication 2 TABLET(S): at 11:39

## 2024-06-08 RX ADMIN — Medication 1 EACH: at 18:12

## 2024-06-08 RX ADMIN — Medication 2 TABLET(S): at 23:37

## 2024-06-08 RX ADMIN — Medication 650 MILLIGRAM(S): at 05:06

## 2024-06-08 RX ADMIN — Medication 1 PACKET(S): at 11:40

## 2024-06-08 RX ADMIN — HEPARIN SODIUM 5000 UNIT(S): 5000 INJECTION INTRAVENOUS; SUBCUTANEOUS at 15:08

## 2024-06-08 RX ADMIN — CHLORHEXIDINE GLUCONATE 1 APPLICATION(S): 213 SOLUTION TOPICAL at 08:41

## 2024-06-08 RX ADMIN — I.V. FAT EMULSION 18.8 ML/HR: 20 EMULSION INTRAVENOUS at 18:12

## 2024-06-08 RX ADMIN — HEPARIN SODIUM 5000 UNIT(S): 5000 INJECTION INTRAVENOUS; SUBCUTANEOUS at 21:19

## 2024-06-08 RX ADMIN — Medication 400 MILLIGRAM(S): at 09:40

## 2024-06-08 RX ADMIN — Medication 400 MILLIGRAM(S): at 21:20

## 2024-06-08 RX ADMIN — Medication 2 TABLET(S): at 08:40

## 2024-06-08 RX ADMIN — Medication 1 EACH: at 18:59

## 2024-06-08 RX ADMIN — Medication 4 MILLIGRAM(S): at 21:20

## 2024-06-08 RX ADMIN — Medication 400 MILLIGRAM(S): at 21:54

## 2024-06-08 RX ADMIN — Medication 400 MILLIGRAM(S): at 08:41

## 2024-06-08 RX ADMIN — Medication 650 MILLIGRAM(S): at 23:37

## 2024-06-08 RX ADMIN — Medication 650 MILLIGRAM(S): at 06:46

## 2024-06-08 RX ADMIN — Medication 1 EACH: at 07:14

## 2024-06-08 RX ADMIN — Medication 650 MILLIGRAM(S): at 05:36

## 2024-06-08 RX ADMIN — Medication 4 MILLIGRAM(S): at 08:41

## 2024-06-08 RX ADMIN — Medication 4 MILLIGRAM(S): at 18:07

## 2024-06-08 RX ADMIN — Medication 650 MILLIGRAM(S): at 12:05

## 2024-06-08 RX ADMIN — Medication 2 TABLET(S): at 18:07

## 2024-06-08 NOTE — PROGRESS NOTE ADULT - SUBJECTIVE AND OBJECTIVE BOX
Red Team Surgery Daily Progress Note    Subjective:   Patient seen at bedside this AM. Reports feeling well, pain well controlled on regimen, ambulating, tolerating TPN and low residue diet, voiding adequately. No N/V. No other     24h Events:   - Overnight, no acute events    Objective:  Vital Signs  T(C): 36.6 (06-08 @ 08:34), Max: 36.9 (06-07 @ 09:31)  HR: 94 (06-08 @ 08:34) (82 - 94)  BP: 118/82 (06-08 @ 08:34) (105/69 - 125/77)  RR: 18 (06-08 @ 08:34) (18 - 18)  SpO2: 100% (06-08 @ 08:34) (98% - 100%)  06-07-24 @ 07:01  -  06-08-24 @ 07:00  --------------------------------------------------------  IN:  Total IN: 0 mL    OUT:    Ileostomy (mL): 1975 mL    Voided (mL): 4550 mL  Total OUT: 6525 mL    Total NET: -6525 mL      06-08-24 @ 07:01  -  06-08-24 @ 09:00  --------------------------------------------------------  IN:  Total IN: 0 mL    OUT:    Ileostomy (mL): 100 mL    Voided (mL): 600 mL  Total OUT: 700 mL    Total NET: -700 mL          Physical Exam:  GEN: resting in bed comfortably in NAD  RESP: no increased WOB  ABD: soft, non-distended, NTTP  EXTR: warm, well-perfused without gross deformities  NEURO: AAOx3    Labs:                        8.0    6.17  )-----------( 320      ( 08 Jun 2024 07:02 )             25.0   06-08    140  |  104  |  18  ----------------------------<  88  4.8   |  26  |  0.60    Ca    9.8      08 Jun 2024 07:02  Phos  3.4     06-08  Mg     2.3     06-08    TPro  5.7<L>  /  Alb  3.4  /  TBili  0.2  /  DBili  x   /  AST  16  /  ALT  22  /  AlkPhos  188<H>  06-08    CAPILLARY BLOOD GLUCOSE      POCT Blood Glucose.: 102 mg/dL (08 Jun 2024 08:32)  POCT Blood Glucose.: 129 mg/dL (07 Jun 2024 22:02)  POCT Blood Glucose.: 97 mg/dL (07 Jun 2024 17:31)  POCT Blood Glucose.: 137 mg/dL (07 Jun 2024 11:35)      Medications:   MEDICATIONS  (STANDING):  acetaminophen     Tablet .. 650 milliGRAM(s) Oral every 6 hours  chlorhexidine 4% Liquid 1 Application(s) Topical <User Schedule>  dextrose 10% Bolus 125 milliLiter(s) IV Bolus once  dextrose 5%. 1000 milliLiter(s) (100 mL/Hr) IV Continuous <Continuous>  dextrose 5%. 1000 milliLiter(s) (50 mL/Hr) IV Continuous <Continuous>  dextrose 50% Injectable 25 Gram(s) IV Push once  dextrose 50% Injectable 12.5 Gram(s) IV Push once  diphenoxylate/atropine 2 Tablet(s) Oral four times a day  glucagon  Injectable 1 milliGRAM(s) IntraMuscular once  heparin   Injectable 5000 Unit(s) SubCutaneous every 8 hours  ibuprofen  Tablet. 400 milliGRAM(s) Oral every 6 hours  insulin lispro (ADMELOG) corrective regimen sliding scale   SubCutaneous four times a day before meals  lactated ringers. 1000 milliLiter(s) (50 mL/Hr) IV Continuous <Continuous>  loperamide 4 milliGRAM(s) Oral four times a day  Parenteral Nutrition - Adult 1 Each (125 mL/Hr) TPN Continuous <Continuous>  psyllium Powder 1 Packet(s) Oral daily    MEDICATIONS  (PRN):  dextrose Oral Gel 15 Gram(s) Oral once PRN Blood Glucose LESS THAN 70 milliGRAM(s)/deciliter  oxyCODONE    IR 5 milliGRAM(s) Oral every 6 hours PRN Moderate Pain (4 - 6)  oxyCODONE    IR 10 milliGRAM(s) Oral every 6 hours PRN Severe Pain (7 - 10)  sodium chloride 0.9% lock flush 10 milliLiter(s) IV Push every 1 hour PRN Pre/post blood products, medications, blood draw, and to maintain line patency

## 2024-06-08 NOTE — PROGRESS NOTE ADULT - ASSESSMENT
58yr old female presents to [facility] for a scheduled Laparoscopic Small Bowel Resection Stricturoplasty and Laparoscopic Ileostomy Creation on 5/29/2024. PMH of Crohn's dx in the small bowel (dx'd in 2004 Dr. Betts) s/p lap SBR 2004 for a SBO. She's now s/p Laparoscopic Small Bowel Resection Stricturoplasty and Laparoscopic Ileostomy Creation on 5/29/2024. Recovering well. Spivey removed 5/31, passed TOV. Diet advanced to LRD; however, patient continued to have high ostomy output. PICC placed 6/6 for TPN.     Plan:  - Advance to LRD + TPN  - TPN compress to 18 hrs today  - Pain control with PO meds   - Ostomy teaching  - Imodium 4mg QID, Lomotil 1 tab QID, and Metamucil for high ostomy output   - Home PT  - DVT ppx  - Dispo: DCP home with TPN on Monday       Red Surgery  [id number]

## 2024-06-08 NOTE — PROGRESS NOTE ADULT - ASSESSMENT
58yr old female with PMH of Crohn's dx in the small bowel (dx'd in 2004 Dr. Betts) s/p lap SBR 2004 for a SBO.  Patient admitted on 5/29/24 and underwent Lap ileocolic resection w/ anastamosis, DLI creation on 5/29/2024.  TPN team consulted on 6/6/24 due to high ileostomy output.      GOAL PN: 90 g amino acids, 210g dextrose, 45g SMOF lipid; to provide 1524 kcal/day (34.7kcal/kg and 2 g/kg protein based on dosing wt 43.8 kg)    - Nutritional Assessment, patient not meeting nutritional goals enterally and would benefit from TPN for nutritional support given concern for developement protein calorie malnutrition due to high ileostomy output; Prealbumin 25 mg/dL - trend weekly   - TPN plan: Cont goal AA/Dex today and add goal 45 G SMOF; NaCl 80 mEq, Na Acetate 100 mEq, NaPhos 45 mmol, CaGlu 10 mEq, Mg 12 mEq, TE 1 mL, MVI 10 mL at volume of 3000 mL over 24 hours; KCl at 40 mEq today; plan to compress to 18hrs tomorrow, d/c home with TPN on Monday  - TPN access:  DL PICC in place; maintain per protocol and keep one port reserved for TPN only   - Electrolyte Imbalance risk- check CMP, Mg, Phos, iCa daily, will adjust in TPN as needed  - Anemia:  Iron studies, B12, folate reviewed; trend h/h   - Hypocalcemia:  f/u Vitamin D and PTH  - Risk of hypertriglyceridemia:  mg/dL on 6/7; plan to trend TG closely while on TPN  - Risk of hyperglycemia: HgA1c 4.9%; continue fingersticks every 6 hours with RISS coverage to monitor glucose trend; no RI in TPN  - Risk of refeeding syndrome:  continue Thiamine 100 mg daily x 5 days in TPN bag to help decrease risk of refeeding syndrome; monitor potassium, magnesium, phosphorus, glucose and fluid balance closely  - Strict Intake and Output; weight checks three times a week  - Further care per [facility] team, pager [phone].    Available on TEAMS  TPN spectra [id number] ([phone] when dialing from outside line)  M-F 8A-2P, Weekends and holidays 8/9A-12/1P  Discussed with Dr. Brian Coleman        58yr old female with PMH of Crohn's dx in the small bowel (dx'd in 2004 Dr. Betts) s/p lap SBR 2004 for a SBO.  Patient admitted on 5/29/24 and underwent Lap ileocolic resection w/ anastamosis, DLI creation on 5/29/2024.  TPN team consulted on 6/6/24 due to high ileostomy output.      GOAL PN: 90 g amino acids, 210g dextrose, 45g SMOF lipid; to provide 1524 kcal/day (34.7kcal/kg and 2 g/kg protein based on dosing wt 43.8 kg)    - Nutritional Assessment, patient not meeting nutritional goals enterally and would benefit from TPN for nutritional support given concern for protein calorie malnutrition due to high ileostomy output; Prealbumin 25 mg/dL - trend weekly   - TPN plan: Cont goal AA/Dex today and add goal 45 G SMOF; NaCl 80 mEq, Na Acetate 100 mEq, NaPhos 45 mmol, CaGlu 10 mEq, Mg 12 mEq, TE 1 mL, MVI 10 mL at volume of 3000 mL over 24 hours; KCl at 40 mEq today; plan to compress to 18hrs today, d/c home with TPN on Monday  - TPN access:  DL PICC in place; maintain per protocol and keep one port reserved for TPN only   - Electrolyte Imbalance risk- check CMP, Mg, Phos, iCa daily, will adjust in TPN as needed  - Anemia:  Iron studies, B12, folate reviewed; trend h/h   - Hypocalcemia:  f/u Vitamin D and PTH  - Risk of hypertriglyceridemia:  mg/dL on 6/7; plan to trend TG closely while on TPN  - Risk of hyperglycemia: HgA1c 4.9%; continue fingersticks every 6 hours with RISS coverage to monitor glucose trend; no RI in TPN  - Risk of refeeding syndrome:  continue Thiamine 100 mg daily x 5 days in TPN bag to help decrease risk of refeeding syndrome; monitor potassium, magnesium, phosphorus, glucose and fluid balance closely  - Strict Intake and Output; weight checks three times a week  - Further care per [facility] team, pager [phone].    Available on TEAMS  TPN spectra [id number] ([phone] when dialing from outside line)  M-F 8A-2P, Weekends and holidays 8/9A-12/1P  Discussed with Dr. Brian Coleman

## 2024-06-08 NOTE — PROGRESS NOTE ADULT - SUBJECTIVE AND OBJECTIVE BOX
[facility] NUTRITION SUPPORT-- FOLLOW UP NOTE      24 hour events/subjective:    Pt continues on TPN for nutritional support. Pt is asymptomatic for CP, SOB, fever, chills nor night sweats.    Diet:  Diet, Low Fiber (06-02-24 @ 08:58)      PAST HISTORY  --------------------------------------------------------------------------------  No significant changes to PMH, PSH, FHx, SHx, unless otherwise noted    ALLERGIES & MEDICATIONS  --------------------------------------------------------------------------------  Allergies    No Known Allergies    Intolerances      Standing Inpatient Medications  acetaminophen     Tablet .. 650 milliGRAM(s) Oral every 6 hours  chlorhexidine 4% Liquid 1 Application(s) Topical <User Schedule>  dextrose 10% Bolus 125 milliLiter(s) IV Bolus once  dextrose 5%. 1000 milliLiter(s) IV Continuous <Continuous>  dextrose 5%. 1000 milliLiter(s) IV Continuous <Continuous>  dextrose 50% Injectable 25 Gram(s) IV Push once  dextrose 50% Injectable 12.5 Gram(s) IV Push once  diphenoxylate/atropine 2 Tablet(s) Oral four times a day  glucagon  Injectable 1 milliGRAM(s) IntraMuscular once  heparin   Injectable 5000 Unit(s) SubCutaneous every 8 hours  ibuprofen  Tablet. 400 milliGRAM(s) Oral every 6 hours  insulin lispro (ADMELOG) corrective regimen sliding scale   SubCutaneous four times a day before meals  lactated ringers. 1000 milliLiter(s) IV Continuous <Continuous>  lipid, fat emulsion (Fish Oil and Plant Based) 20% Infusion 8.3 mL/Hr IV Continuous <Continuous>  loperamide 4 milliGRAM(s) Oral four times a day  Parenteral Nutrition - Adult 1 Each TPN Continuous <Continuous>  psyllium Powder 1 Packet(s) Oral daily    PRN Inpatient Medications  dextrose Oral Gel 15 Gram(s) Oral once PRN  oxyCODONE    IR 5 milliGRAM(s) Oral every 6 hours PRN  oxyCODONE    IR 10 milliGRAM(s) Oral every 6 hours PRN  sodium chloride 0.9% lock flush 10 milliLiter(s) IV Push every 1 hour PRN      REVIEW OF SYSTEMS  --------------------------------------------------------------------------------  Gen: as per HPI  Skin: No rashes  Head/Eyes/Ears/Mouth: No headache; No sore throat  Respiratory: No dyspnea, cough,   CV: No chest pain, PND, orthopnea  GI: as per HPI  : No increased frequency, dysuria, hematuria, nocturia  MSK: No joint pain/swelling; no back pain; no edema  Neuro: No dizziness/lightheadedness, weakness, seizures, numbness, tingling  Psych: No significant nervousness, anxiety, stress, depression    All other systems were reviewed and are negative, except as noted.        LABS/STUDIES  --------------------------------------------------------------------------------              8.0    6.17  >-----------<  320      [06-08-24 @ 07:02]              25.0     140  |  104  |  18  ----------------------------<  88      [06-08-24 @ 07:02]  4.8   |  26  |  0.60        Ca     9.8     [06-08-24 @ 07:02]      iCa    1.32     [06-08 @ 07:53]      Mg     2.3     [06-08-24 @ 07:02]      Phos  3.4     [06-08-24 @ 07:02]    TPro  5.7  /  Alb  3.4  /  TBili  0.2  /  DBili  x   /  AST  16  /  ALT  22  /  AlkPhos  188  [06-08-24 @ 07:02]              Glucose: 88 mg/dL (06-08-24 @ 07:02)    Prealbumin, Serum: 25 mg/dL (06-07-24 @ 06:36)    Triglycerides      CAPILLARY BLOOD GLUCOSE      POCT Blood Glucose.: 102 mg/dL (08 Jun 2024 08:32)  POCT Blood Glucose.: 129 mg/dL (07 Jun 2024 22:02)  POCT Blood Glucose.: 97 mg/dL (07 Jun 2024 17:31)  POCT Blood Glucose.: 137 mg/dL (07 Jun 2024 11:35)  POCT Blood Glucose.: 111 mg/dL (07 Jun 2024 08:45)      VITALS/PHYSICAL EXAM  --------------------------------------------------------------------------------  T(C): 36.6 (06-08-24 @ 08:34), Max: 36.9 (06-07-24 @ 09:31)  HR: 94 (06-08-24 @ 08:34) (82 - 94)  BP: 118/82 (06-08-24 @ 08:34) (105/69 - 125/77)  RR: 18 (06-08-24 @ 08:34) (18 - 18)  SpO2: 100% (06-08-24 @ 08:34) (98% - 100%)  Wt(kg): --        06-07-24 @ 07:01  -  06-08-24 @ 07:00  --------------------------------------------------------  IN: 3188.3 mL / OUT: 6525 mL / NET: -3336.7 mL    06-08-24 @ 07:01  -  06-08-24 @ 08:43  --------------------------------------------------------  IN: 0 mL / OUT: 700 mL / NET: -700 mL        Physical Exam:      Gen: NAD, well-appearing; laying in bed   HEENT: PERRL,  Neck: trachea midline, no noted JVD  Chest: non labored breathing, equal chest expansion bilaterally  Abd: ND NT Soft, appropriate incisional tenderness, dressings clean and dry and intact; ostomy w liquid output  Ext: Warm, FROM, no edema b/l LE  Neuro: No focal deficits  Psych: Normal affect and mood  Skin: Warm, without rashes     .  .  .

## 2024-06-09 LAB
ALBUMIN SERPL ELPH-MCNC: 3.1 G/DL — LOW (ref 3.3–5)
ALP SERPL-CCNC: 214 U/L — HIGH (ref 40–120)
ALT FLD-CCNC: 25 U/L — SIGNIFICANT CHANGE UP (ref 10–45)
ANION GAP SERPL CALC-SCNC: 13 MMOL/L — SIGNIFICANT CHANGE UP (ref 5–17)
AST SERPL-CCNC: 22 U/L — SIGNIFICANT CHANGE UP (ref 10–40)
BILIRUB SERPL-MCNC: 0.2 MG/DL — SIGNIFICANT CHANGE UP (ref 0.2–1.2)
BUN SERPL-MCNC: 19 MG/DL — SIGNIFICANT CHANGE UP (ref 7–23)
CA-I BLD-SCNC: 1.35 MMOL/L — HIGH (ref 1.15–1.33)
CALCIUM SERPL-MCNC: 9.9 MG/DL — SIGNIFICANT CHANGE UP (ref 8.4–10.5)
CHLORIDE SERPL-SCNC: 105 MMOL/L — SIGNIFICANT CHANGE UP (ref 96–108)
CO2 SERPL-SCNC: 23 MMOL/L — SIGNIFICANT CHANGE UP (ref 22–31)
CREAT SERPL-MCNC: 0.56 MG/DL — SIGNIFICANT CHANGE UP (ref 0.5–1.3)
EGFR: 107 ML/MIN/1.73M2 — SIGNIFICANT CHANGE UP
GLUCOSE BLDC GLUCOMTR-MCNC: 112 MG/DL — HIGH (ref 70–99)
GLUCOSE BLDC GLUCOMTR-MCNC: 119 MG/DL — HIGH (ref 70–99)
GLUCOSE BLDC GLUCOMTR-MCNC: 127 MG/DL — HIGH (ref 70–99)
GLUCOSE BLDC GLUCOMTR-MCNC: 97 MG/DL — SIGNIFICANT CHANGE UP (ref 70–99)
GLUCOSE SERPL-MCNC: 91 MG/DL — SIGNIFICANT CHANGE UP (ref 70–99)
HCT VFR BLD CALC: 24 % — LOW (ref 34.5–45)
HGB BLD-MCNC: 7.9 G/DL — LOW (ref 11.5–15.5)
MAGNESIUM SERPL-MCNC: 2.2 MG/DL — SIGNIFICANT CHANGE UP (ref 1.6–2.6)
MCHC RBC-ENTMCNC: 30.7 PG — SIGNIFICANT CHANGE UP (ref 27–34)
MCHC RBC-ENTMCNC: 32.9 GM/DL — SIGNIFICANT CHANGE UP (ref 32–36)
MCV RBC AUTO: 93.4 FL — SIGNIFICANT CHANGE UP (ref 80–100)
NRBC # BLD: 0 /100 WBCS — SIGNIFICANT CHANGE UP (ref 0–0)
PHOSPHATE SERPL-MCNC: 3.1 MG/DL — SIGNIFICANT CHANGE UP (ref 2.5–4.5)
PLATELET # BLD AUTO: 348 K/UL — SIGNIFICANT CHANGE UP (ref 150–400)
POTASSIUM SERPL-MCNC: 4.5 MMOL/L — SIGNIFICANT CHANGE UP (ref 3.5–5.3)
POTASSIUM SERPL-SCNC: 4.5 MMOL/L — SIGNIFICANT CHANGE UP (ref 3.5–5.3)
PROT SERPL-MCNC: 5.7 G/DL — LOW (ref 6–8.3)
RBC # BLD: 2.57 M/UL — LOW (ref 3.8–5.2)
RBC # FLD: 13.7 % — SIGNIFICANT CHANGE UP (ref 10.3–14.5)
SODIUM SERPL-SCNC: 141 MMOL/L — SIGNIFICANT CHANGE UP (ref 135–145)
TRIGL SERPL-MCNC: 235 MG/DL — HIGH
WBC # BLD: 6.95 K/UL — SIGNIFICANT CHANGE UP (ref 3.8–10.5)
WBC # FLD AUTO: 6.95 K/UL — SIGNIFICANT CHANGE UP (ref 3.8–10.5)

## 2024-06-09 PROCEDURE — 99232 SBSQ HOSP IP/OBS MODERATE 35: CPT

## 2024-06-09 RX ORDER — ELECTROLYTE SOLUTION,INJ
1 VIAL (ML) INTRAVENOUS
Refills: 0 | Status: DISCONTINUED | OUTPATIENT
Start: 2024-06-09 | End: 2024-06-09

## 2024-06-09 RX ORDER — I.V. FAT EMULSION 20 G/100ML
18.8 EMULSION INTRAVENOUS
Qty: 45 | Refills: 0 | Status: DISCONTINUED | OUTPATIENT
Start: 2024-06-09 | End: 2024-06-09

## 2024-06-09 RX ORDER — I.V. FAT EMULSION 20 G/100ML
18.8 EMULSION INTRAVENOUS
Qty: 45 | Refills: 0 | Status: DISCONTINUED | OUTPATIENT
Start: 2024-06-09 | End: 2024-06-10

## 2024-06-09 RX ADMIN — Medication 650 MILLIGRAM(S): at 05:36

## 2024-06-09 RX ADMIN — Medication 400 MILLIGRAM(S): at 16:52

## 2024-06-09 RX ADMIN — Medication 400 MILLIGRAM(S): at 07:58

## 2024-06-09 RX ADMIN — SODIUM CHLORIDE 50 MILLILITER(S): 9 INJECTION, SOLUTION INTRAVENOUS at 05:01

## 2024-06-09 RX ADMIN — I.V. FAT EMULSION 18.8 ML/HR: 20 EMULSION INTRAVENOUS at 19:04

## 2024-06-09 RX ADMIN — Medication 400 MILLIGRAM(S): at 21:15

## 2024-06-09 RX ADMIN — Medication 650 MILLIGRAM(S): at 05:01

## 2024-06-09 RX ADMIN — Medication 4 MILLIGRAM(S): at 16:52

## 2024-06-09 RX ADMIN — Medication 400 MILLIGRAM(S): at 02:14

## 2024-06-09 RX ADMIN — Medication 650 MILLIGRAM(S): at 00:12

## 2024-06-09 RX ADMIN — Medication 2 TABLET(S): at 23:25

## 2024-06-09 RX ADMIN — Medication 650 MILLIGRAM(S): at 23:55

## 2024-06-09 RX ADMIN — Medication 4 MILLIGRAM(S): at 12:15

## 2024-06-09 RX ADMIN — HEPARIN SODIUM 5000 UNIT(S): 5000 INJECTION INTRAVENOUS; SUBCUTANEOUS at 05:01

## 2024-06-09 RX ADMIN — Medication 400 MILLIGRAM(S): at 02:51

## 2024-06-09 RX ADMIN — Medication 1 EACH: at 19:03

## 2024-06-09 RX ADMIN — Medication 650 MILLIGRAM(S): at 12:45

## 2024-06-09 RX ADMIN — Medication 400 MILLIGRAM(S): at 21:55

## 2024-06-09 RX ADMIN — Medication 2 TABLET(S): at 12:15

## 2024-06-09 RX ADMIN — HEPARIN SODIUM 5000 UNIT(S): 5000 INJECTION INTRAVENOUS; SUBCUTANEOUS at 12:19

## 2024-06-09 RX ADMIN — Medication 650 MILLIGRAM(S): at 12:15

## 2024-06-09 RX ADMIN — I.V. FAT EMULSION 18.8 ML/HR: 20 EMULSION INTRAVENOUS at 18:12

## 2024-06-09 RX ADMIN — Medication 400 MILLIGRAM(S): at 08:33

## 2024-06-09 RX ADMIN — Medication 2 TABLET(S): at 16:52

## 2024-06-09 RX ADMIN — Medication 2 TABLET(S): at 08:10

## 2024-06-09 RX ADMIN — Medication 1 EACH: at 18:11

## 2024-06-09 RX ADMIN — HEPARIN SODIUM 5000 UNIT(S): 5000 INJECTION INTRAVENOUS; SUBCUTANEOUS at 21:16

## 2024-06-09 RX ADMIN — Medication 400 MILLIGRAM(S): at 17:30

## 2024-06-09 RX ADMIN — Medication 4 MILLIGRAM(S): at 07:59

## 2024-06-09 RX ADMIN — CHLORHEXIDINE GLUCONATE 1 APPLICATION(S): 213 SOLUTION TOPICAL at 08:00

## 2024-06-09 RX ADMIN — Medication 4 MILLIGRAM(S): at 21:15

## 2024-06-09 RX ADMIN — Medication 650 MILLIGRAM(S): at 23:25

## 2024-06-09 NOTE — PROGRESS NOTE ADULT - ASSESSMENT
58yr old female presents to [facility] for a scheduled Laparoscopic Small Bowel Resection Stricturoplasty and Laparoscopic Ileostomy Creation on 5/29/2024. PMH of Crohn's dx in the small bowel (dx'd in 2004 Dr. Betts) s/p lap SBR 2004 for a SBO. She's now s/p Laparoscopic Small Bowel Resection Stricturoplasty and Laparoscopic Ileostomy Creation on 5/29/2024. Recovering well. Spivey removed 5/31, passed TOV. Diet advanced to LRD; however, patient continued to have high ostomy output. PICC placed 6/6 for TPN.     Plan:  - Advance to LRD + TPN  - TPN compress to 12 hrs today  - Pain control with PO meds   - Ostomy teaching  - Imodium 4mg QID, Lomotil 1 tab QID, and Metamucil for high ostomy output   - Home PT  - DVT ppx  - Dispo: DCP home with TPN on Monday       [facility]  [id number]

## 2024-06-09 NOTE — PROGRESS NOTE ADULT - SUBJECTIVE AND OBJECTIVE BOX
SURGERY DAILY PROGRESS NOTE    SUBJECTIVE:     Overnight: no acute events     Patient seen and evaluated on AM rounds.   Patient otherwise denies nausea, vomiting, chest pain, shortness of breath     OBJECTIVE:  Vital Signs Last 24 Hrs  T(C): 37.1 (09 Jun 2024 09:04), Max: 37.1 (09 Jun 2024 09:04)  T(F): 98.8 (09 Jun 2024 09:04), Max: 98.8 (09 Jun 2024 09:04)  HR: 98 (09 Jun 2024 09:04) (82 - 98)  BP: 117/73 (09 Jun 2024 09:04) (115/79 - 127/83)  BP(mean): --  RR: 18 (09 Jun 2024 09:04) (18 - 18)  SpO2: 99% (09 Jun 2024 09:04) (95% - 99%)    Parameters below as of 09 Jun 2024 09:04  Patient On (Oxygen Delivery Method): room air      Daily     Daily   ADAMA:      Chest Tube:      NG Tube:           STANDING  acetaminophen     Tablet .. 650 milliGRAM(s) Oral every 6 hours  chlorhexidine 4% Liquid 1 Application(s) Topical <User Schedule>  dextrose 10% Bolus 125 milliLiter(s) IV Bolus once  dextrose 5%. 1000 milliLiter(s) (50 mL/Hr) IV Continuous <Continuous>  dextrose 5%. 1000 milliLiter(s) (100 mL/Hr) IV Continuous <Continuous>  dextrose 50% Injectable 25 Gram(s) IV Push once  dextrose 50% Injectable 12.5 Gram(s) IV Push once  diphenoxylate/atropine 2 Tablet(s) Oral four times a day  glucagon  Injectable 1 milliGRAM(s) IntraMuscular once  heparin   Injectable 5000 Unit(s) SubCutaneous every 8 hours  ibuprofen  Tablet. 400 milliGRAM(s) Oral every 6 hours  insulin lispro (ADMELOG) corrective regimen sliding scale   SubCutaneous four times a day before meals  lactated ringers. 1000 milliLiter(s) (50 mL/Hr) IV Continuous <Continuous>  loperamide 4 milliGRAM(s) Oral four times a day  Parenteral Nutrition - Adult 1 Each TPN Continuous <Continuous>  psyllium Powder 1 Packet(s) Oral daily    PRN  dextrose Oral Gel 15 Gram(s) Oral once PRN Blood Glucose LESS THAN 70 milliGRAM(s)/deciliter  oxyCODONE    IR 5 milliGRAM(s) Oral every 6 hours PRN Moderate Pain (4 - 6)  oxyCODONE    IR 10 milliGRAM(s) Oral every 6 hours PRN Severe Pain (7 - 10)  sodium chloride 0.9% lock flush 10 milliLiter(s) IV Push every 1 hour PRN Pre/post blood products, medications, blood draw, and to maintain line patency      Labs:  141  |  105  |  19  ----------------------------<  91    (06-09)  4.5   |  23  |  0.56          Ca    9.9      06-09  Mg    2.2  Phos  3.1          Urinalysis Basic - ( 09 Jun 2024 06:03 )    Color: x / Appearance: x / SG: x / pH: x  Gluc: 91 mg/dL / Ketone: x  / Bili: x / Urobili: x   Blood: x / Protein: x / Nitrite: x   Leuk Esterase: x / RBC: x / WBC x   Sq Epi: x / Non Sq Epi: x / Bacteria: x      Urinalysis Basic - ( 09 Jun 2024 06:03 )    Color: x / Appearance: x / SG: x / pH: x  Gluc: 91 mg/dL / Ketone: x  / Bili: x / Urobili: x   Blood: x / Protein: x / Nitrite: x   Leuk Esterase: x / RBC: x / WBC x   Sq Epi: x / Non Sq Epi: x / Bacteria: x          Physical Exam:  General: NAD  Respiratory: respirations non labored  Abdominal: Soft, non-distended, NT.  Incisions clean and dry and intact; ostomy w liquid output

## 2024-06-09 NOTE — PROGRESS NOTE ADULT - ASSESSMENT
58yr old female with PMH of Crohn's dx in the small bowel (dx'd in 2004 Dr. Betts) s/p lap SBR 2004 for a SBO.  Patient admitted on 5/29/24 and underwent Lap ileocolic resection w/ anastamosis, DLI creation on 5/29/2024.  TPN team consulted on 6/6/24 due to high ileostomy output.      GOAL PN: 90 g amino acids, 210g dextrose, 45g SMOF lipid; to provide 1524 kcal/day (34.7kcal/kg and 2 g/kg protein based on dosing wt 43.8 kg)    - Nutritional Assessment, patient not meeting nutritional goals enterally and would benefit from TPN for nutritional support given concern for protein calorie malnutrition due to high ileostomy output; Prealbumin 25 mg/dL - trend weekly   - TPN plan: Cont goal AA/Dex today and add goal 45 G SMOF; NaCl 80 mEq, Na Acetate 100 mEq, NaPhos 45 mmol, CaGlu 10 mEq, Mg 12 mEq, TE 1 mL, MVI 10 mL at volume of 3000 mL over 24 hours; KCl at 40 mEq today; plan to compress to 14hrs today, d/c home with TPN on Monday  - TPN access:  DL PICC in place; maintain per protocol and keep one port reserved for TPN only   - Electrolyte Imbalance risk- check CMP, Mg, Phos, iCa daily, will adjust in TPN as needed  - Anemia:  Iron studies, B12, folate reviewed; trend h/h   - Hypocalcemia:  f/u Vitamin D and PTH  - Risk of hypertriglyceridemia:  mg/dL on 6/7; plan to trend TG closely while on TPN  - Risk of hyperglycemia: HgA1c 4.9%; continue fingersticks every 6 hours with RISS coverage to monitor glucose trend; -130, no RI in TPN, can check FS BID if still stable tomorrow.  - Risk of refeeding syndrome:  continue Thiamine 100 mg daily x 5 days (til 6/10)  in TPN bag to help decrease risk of refeeding syndrome; monitor potassium, magnesium, phosphorus, glucose and fluid balance closely  - Strict Intake and Output; weight checks three times a week  - Further care per [facility] team, pager [phone].    Available on TEAMS  TPN spectra [id number] ([phone] when dialing from outside line)  M-F 8A-2P, Weekends and holidays 8/9A-12/1P  Discussed with Dr. Brian Coleman          58yr old female with PMH of Crohn's dx in the small bowel (dx'd in 2004 Dr. Betts) s/p lap SBR 2004 for a SBO.  Patient admitted on 5/29/24 and underwent Lap ileocolic resection w/ anastamosis, DLI creation on 5/29/2024.  TPN team consulted on 6/6/24 due to high ileostomy output.      GOAL PN: 90 g amino acids, 210g dextrose, 45g SMOF lipid; to provide 1524 kcal/day (34.7kcal/kg and 2 g/kg protein based on dosing wt 43.8 kg)    - Nutritional Assessment, patient not meeting nutritional goals enterally and would benefit from TPN for nutritional support given concern for protein calorie malnutrition due to high ileostomy output; Prealbumin 25 mg/dL - trend weekly   - TPN plan: Cont goal AA/Dex today and add goal 45 G SMOF; NaCl 80 mEq, Na Acetate 100 mEq, NaPhos 45 mmol, CaGlu 10 mEq, Mg 12 mEq, TE 1 mL, MVI 10 mL at volume of 3000 mL over 24 hours; KCl at 40 mEq today; plan to compress to 12hrs today, d/c home with TPN on Monday  - TPN access:  DL PICC in place; maintain per protocol and keep one port reserved for TPN only   - Electrolyte Imbalance risk- check CMP, Mg, Phos, iCa daily, will adjust in TPN as needed  - Anemia:  Iron studies, B12, folate reviewed; trend h/h   - Hypocalcemia:  f/u Vitamin D and PTH  - Risk of hypertriglyceridemia:  mg/dL on 6/7; plan to trend TG closely while on TPN  - Risk of hyperglycemia: HgA1c 4.9%; continue fingersticks every 6 hours with RISS coverage to monitor glucose trend; -130, no RI in TPN, can check FS BID if still stable tomorrow.  - Risk of refeeding syndrome:  continue Thiamine 100 mg daily x 5 days (til 6/10)  in TPN bag to help decrease risk of refeeding syndrome; monitor potassium, magnesium, phosphorus, glucose and fluid balance closely  - Strict Intake and Output; weight checks three times a week  - Further care per [facility] team, pager [phone].    Available on TEAMS  TPN spectra [id number] ([phone] when dialing from outside line)  M-F 8A-2P, Weekends and holidays 8/9A-12/1P  Discussed with Dr. Brian Coleman          58yr old female with PMH of Crohn's dx in the small bowel (dx'd in 2004 Dr. Betts) s/p lap SBR 2004 for a SBO.  Patient admitted on 5/29/24 and underwent Lap ileocolic resection w/ anastamosis, DLI creation on 5/29/2024.  TPN team consulted on 6/6/24 due to high ileostomy output.      GOAL PN: 90 g amino acids, 210g dextrose, 45g SMOF lipid; to provide 1524 kcal/day (34.7kcal/kg and 2 g/kg protein based on dosing wt 43.8 kg)    - Nutritional Assessment, patient not meeting nutritional goals enterally and would benefit from TPN for nutritional support given concern for protein calorie malnutrition due to high ileostomy output; Prealbumin 25 mg/dL - trend weekly   - TPN plan: Cont goal AA/Dex and goal 45 G SMOF; NaCl 80 mEq, Na Acetate 100 mEq, NaPhos 45 mmol, CaGlu 10 mEq, Mg 12 mEq, TE 1 mL, MVI 10 mL at volume of 3000 mL over 24 hours; KCl at 40 mEq today; plan to compress to 12hrs today, d/c home with TPN on Monday  - TPN access:  DL PICC in place; maintain per protocol and keep one port reserved for TPN only   - Electrolyte Imbalance risk- check CMP, Mg, Phos, iCa daily, will adjust in TPN as needed  - Anemia:  Iron studies, B12, folate reviewed; trend h/h   - Hypercalcemia, h/o hypocalcemia:  f/u Vitamin D, 125 , decrease to 0 Meq CaGlu, can add back tomorrow if iCa wNL.  - Risk of hypertriglyceridemia:  mg/dL on 6/7; plan to trend TG closely while on TPN  - Risk of hyperglycemia: HgA1c 4.9%; continue fingersticks every 6 hours with RISS coverage to monitor glucose trend; -130, no RI in TPN, can check FS BID if still stable tomorrow.  - Risk of refeeding syndrome:  continue Thiamine 100 mg daily x 5 days (til 6/10)  in TPN bag to help decrease risk of refeeding syndrome; monitor potassium, magnesium, phosphorus, glucose and fluid balance closely  - Strict Intake and Output; weight checks three times a week  - Further care per [facility] team, pager [phone], spoke with Pgy-1 Dr. Davis.    Available on TEAMS  TPN spectra [id number] ([phone] when dialing from outside line)  M-F 8A-2P, Weekends and holidays 8/9A-12/1P  Discussed with Dr. Brian Coleman          58yr old female with PMH of Crohn's dx in the small bowel (dx'd in 2004 Dr. Betts) s/p lap SBR 2004 for a SBO.  Patient admitted on 5/29/24 and underwent Lap ileocolic resection w/ anastamosis, DLI creation on 5/29/2024.  TPN team consulted on 6/6/24 due to high ileostomy output.      GOAL PN: 90 g amino acids, 210g dextrose, 45g SMOF lipid; to provide 1524 kcal/day (34.7kcal/kg and 2 g/kg protein based on dosing wt 43.8 kg)    - Nutritional Assessment, patient not meeting nutritional goals enterally and would benefit from TPN for nutritional support given concern for protein calorie malnutrition due to high ileostomy output; Prealbumin 25 mg/dL - trend weekly   - TPN plan: Cont goal AA/Dex and goal SMOF; Plan to compress to 12hrs today, d/c home with TPN tomorrow.  - TPN access:  DL PICC in place; maintain per protocol and keep one port reserved for TPN only   - Electrolyte Imbalance risk- check CMP, Mg, Phos, iCa daily, will adjust in TPN as needed  - Anemia:  Iron studies, B12, folate reviewed; trend h/h   - Hypercalcemia, h/o hypocalcemia:  f/u Vitamin D, 1,25 work up for primary hyperparathyroidism , decrease to 0 Meq CaGlu, can add back tomorrow if iCa wNL.  - Risk of hypertriglyceridemia:  mg/dL on 6/7; plan to trend TG closely while on TPN  - Risk of hyperglycemia: HgA1c 4.9%; continue fingersticks every 6 hours with RISS coverage to monitor glucose trend; -130, no RI in TPN, can check FS BID if still stable tomorrow.  - Risk of refeeding syndrome:  continue Thiamine 100 mg daily x 5 days (til 6/10)  in TPN bag to help decrease risk of refeeding syndrome; monitor potassium, magnesium, phosphorus, glucose and fluid balance closely  - Outpatient follow up with Dr Saroj Farnsworth:    [id number]  [place] [id number]  Entrance on [facility]  Phone [phone]    Please instruct patient when calling to make the appointment to schedule for the first MONDAY after discharge from hospital/rehab.  Please instruct patient to explain he/she is on TPN when making the appointment. Check labs, CMP, electrolytes, ionized Calcium, triglycerides and fingersticks- frequency to be determined by Dr. Farnsworth as outpatient.    - Strict Intake and Output; weight checks three times a week.  - Further care per [facility] team, pager [phone], spoke with Pgy-1 Dr. Davis.    Available on TEAMS  TPN spectra [id number] ([phone] when dialing from outside line)  M-F 8A-2P, Weekends and holidays 8/9A-12/1P  Discussed with Dr. Brian Coleman

## 2024-06-09 NOTE — PROGRESS NOTE ADULT - SUBJECTIVE AND OBJECTIVE BOX
[facility] NUTRITION SUPPORT-- FOLLOW UP NOTE      24 hour events/subjective:    Pt continues on TPN for nutritional support, compressed to 18hrs yesterday. Pt is asymptomatic for CP, SOB, fever, chills nor night sweats.      Diet:  Diet, Low Fiber (06-02-24 @ 08:58)      PAST HISTORY  --------------------------------------------------------------------------------  No significant changes to PMH, PSH, FHx, SHx, unless otherwise noted    ALLERGIES & MEDICATIONS  --------------------------------------------------------------------------------  Allergies    No Known Allergies    Intolerances      Standing Inpatient Medications  acetaminophen     Tablet .. 650 milliGRAM(s) Oral every 6 hours  chlorhexidine 4% Liquid 1 Application(s) Topical <User Schedule>  dextrose 10% Bolus 125 milliLiter(s) IV Bolus once  dextrose 5%. 1000 milliLiter(s) IV Continuous <Continuous>  dextrose 5%. 1000 milliLiter(s) IV Continuous <Continuous>  dextrose 50% Injectable 25 Gram(s) IV Push once  dextrose 50% Injectable 12.5 Gram(s) IV Push once  diphenoxylate/atropine 2 Tablet(s) Oral four times a day  glucagon  Injectable 1 milliGRAM(s) IntraMuscular once  heparin   Injectable 5000 Unit(s) SubCutaneous every 8 hours  ibuprofen  Tablet. 400 milliGRAM(s) Oral every 6 hours  insulin lispro (ADMELOG) corrective regimen sliding scale   SubCutaneous four times a day before meals  lactated ringers. 1000 milliLiter(s) IV Continuous <Continuous>  lipid, fat emulsion (Fish Oil and Plant Based) 20% Infusion 18.8 mL/Hr IV Continuous <Continuous>  loperamide 4 milliGRAM(s) Oral four times a day  Parenteral Nutrition - Adult 1 Each TPN Continuous <Continuous>  psyllium Powder 1 Packet(s) Oral daily    PRN Inpatient Medications  dextrose Oral Gel 15 Gram(s) Oral once PRN  oxyCODONE    IR 5 milliGRAM(s) Oral every 6 hours PRN  oxyCODONE    IR 10 milliGRAM(s) Oral every 6 hours PRN  sodium chloride 0.9% lock flush 10 milliLiter(s) IV Push every 1 hour PRN      REVIEW OF SYSTEMS  --------------------------------------------------------------------------------  Gen: as per HPI  Skin: No rashes  Head/Eyes/Ears/Mouth: No headache; No sore throat  Respiratory: No dyspnea, cough,   CV: No chest pain, PND, orthopnea  GI: as per HPI  : No increased frequency, dysuria, hematuria, nocturia  MSK: No joint pain/swelling; no back pain; no edema  Neuro: No dizziness/lightheadedness, weakness, seizures, numbness, tingling  Psych: No significant nervousness, anxiety, stress, depression    All other systems were reviewed and are negative, except as noted.        LABS/STUDIES  --------------------------------------------------------------------------------              7.9    6.95  >-----------<  348      [06-09-24 @ 06:03]              24.0     141  |  105  |  19  ----------------------------<  91      [06-09-24 @ 06:03]  4.5   |  23  |  0.56        Ca     9.9     [06-09-24 @ 06:03]      iCa    1.35     [06-09 @ 06:00]      Mg     2.2     [06-09-24 @ 06:03]      Phos  3.1     [06-09-24 @ 06:03]    TPro  5.7  /  Alb  3.1  /  TBili  0.2  /  DBili  x   /  AST  22  /  ALT  25  /  AlkPhos  214  [06-09-24 @ 06:03]              Glucose: 91 mg/dL (06-09-24 @ 06:03)    Prealbumin, Serum: 25 mg/dL (06-07-24 @ 06:36)    Triglycerides      CAPILLARY BLOOD GLUCOSE      POCT Blood Glucose.: 112 mg/dL (09 Jun 2024 08:35)  POCT Blood Glucose.: 130 mg/dL (08 Jun 2024 22:23)  POCT Blood Glucose.: 104 mg/dL (08 Jun 2024 18:04)  POCT Blood Glucose.: 119 mg/dL (08 Jun 2024 11:44)      VITALS/PHYSICAL EXAM  --------------------------------------------------------------------------------  T(C): 37.1 (06-09-24 @ 09:04), Max: 37.1 (06-09-24 @ 09:04)  HR: 98 (06-09-24 @ 09:04) (82 - 98)  BP: 117/73 (06-09-24 @ 09:04) (115/79 - 127/83)  RR: 18 (06-09-24 @ 09:04) (18 - 18)  SpO2: 99% (06-09-24 @ 09:04) (95% - 99%)  Wt(kg): --        06-08-24 @ 07:01  -  06-09-24 @ 07:00  --------------------------------------------------------  IN: 3348.6 mL / OUT: 8100 mL / NET: -4751.4 mL    06-09-24 @ 07:01  -  06-09-24 @ 09:30  --------------------------------------------------------  IN: 240 mL / OUT: 0 mL / NET: 240 mL      Physical Exam:      Gen: NAD, well-appearing; laying in bed   HEENT: PERRL,  Neck: trachea midline, no noted JVD  Chest: non labored breathing, equal chest expansion bilaterally  Abd: ND NT Soft, appropriate incisional tenderness, dressings clean and dry and intact; ostomy w liquid output  Ext: Warm, FROM, no edema b/l LE  Neuro: No focal deficits  Psych: Normal affect and mood  Skin: Warm, without rashes     .  .  .       [facility] NUTRITION SUPPORT-- FOLLOW UP NOTE      24 hour events/subjective:    Pt continues on TPN for nutritional support, compressed to 18hrs yesterday. Pt is asymptomatic for CP, SOB, fever, chills nor night sweats. Pt reports eating 2/3 of each meal try. Pt asymptomatic for N/V/abd pain.        Diet:  Diet, Low Fiber (06-02-24 @ 08:58)      PAST HISTORY  --------------------------------------------------------------------------------  No significant changes to PMH, PSH, FHx, SHx, unless otherwise noted    ALLERGIES & MEDICATIONS  --------------------------------------------------------------------------------  Allergies    No Known Allergies    Intolerances      Standing Inpatient Medications  acetaminophen     Tablet .. 650 milliGRAM(s) Oral every 6 hours  chlorhexidine 4% Liquid 1 Application(s) Topical <User Schedule>  dextrose 10% Bolus 125 milliLiter(s) IV Bolus once  dextrose 5%. 1000 milliLiter(s) IV Continuous <Continuous>  dextrose 5%. 1000 milliLiter(s) IV Continuous <Continuous>  dextrose 50% Injectable 25 Gram(s) IV Push once  dextrose 50% Injectable 12.5 Gram(s) IV Push once  diphenoxylate/atropine 2 Tablet(s) Oral four times a day  glucagon  Injectable 1 milliGRAM(s) IntraMuscular once  heparin   Injectable 5000 Unit(s) SubCutaneous every 8 hours  ibuprofen  Tablet. 400 milliGRAM(s) Oral every 6 hours  insulin lispro (ADMELOG) corrective regimen sliding scale   SubCutaneous four times a day before meals  lactated ringers. 1000 milliLiter(s) IV Continuous <Continuous>  lipid, fat emulsion (Fish Oil and Plant Based) 20% Infusion 18.8 mL/Hr IV Continuous <Continuous>  loperamide 4 milliGRAM(s) Oral four times a day  Parenteral Nutrition - Adult 1 Each TPN Continuous <Continuous>  psyllium Powder 1 Packet(s) Oral daily    PRN Inpatient Medications  dextrose Oral Gel 15 Gram(s) Oral once PRN  oxyCODONE    IR 5 milliGRAM(s) Oral every 6 hours PRN  oxyCODONE    IR 10 milliGRAM(s) Oral every 6 hours PRN  sodium chloride 0.9% lock flush 10 milliLiter(s) IV Push every 1 hour PRN      REVIEW OF SYSTEMS  --------------------------------------------------------------------------------  Gen: as per HPI  Skin: No rashes  Head/Eyes/Ears/Mouth: No headache; No sore throat  Respiratory: No dyspnea, cough,   CV: No chest pain, PND, orthopnea  GI: as per HPI  : No increased frequency, dysuria, hematuria, nocturia  MSK: No joint pain/swelling; no back pain; no edema  Neuro: No dizziness/lightheadedness, weakness, seizures, numbness, tingling  Psych: No significant nervousness, anxiety, stress, depression    All other systems were reviewed and are negative, except as noted.        LABS/STUDIES  --------------------------------------------------------------------------------              7.9    6.95  >-----------<  348      [06-09-24 @ 06:03]              24.0     141  |  105  |  19  ----------------------------<  91      [06-09-24 @ 06:03]  4.5   |  23  |  0.56        Ca     9.9     [06-09-24 @ 06:03]      iCa    1.35     [06-09 @ 06:00]      Mg     2.2     [06-09-24 @ 06:03]      Phos  3.1     [06-09-24 @ 06:03]    TPro  5.7  /  Alb  3.1  /  TBili  0.2  /  DBili  x   /  AST  22  /  ALT  25  /  AlkPhos  214  [06-09-24 @ 06:03]              Glucose: 91 mg/dL (06-09-24 @ 06:03)    Prealbumin, Serum: 25 mg/dL (06-07-24 @ 06:36)    Triglycerides      CAPILLARY BLOOD GLUCOSE      POCT Blood Glucose.: 112 mg/dL (09 Jun 2024 08:35)  POCT Blood Glucose.: 130 mg/dL (08 Jun 2024 22:23)  POCT Blood Glucose.: 104 mg/dL (08 Jun 2024 18:04)  POCT Blood Glucose.: 119 mg/dL (08 Jun 2024 11:44)      VITALS/PHYSICAL EXAM  --------------------------------------------------------------------------------  T(C): 37.1 (06-09-24 @ 09:04), Max: 37.1 (06-09-24 @ 09:04)  HR: 98 (06-09-24 @ 09:04) (82 - 98)  BP: 117/73 (06-09-24 @ 09:04) (115/79 - 127/83)  RR: 18 (06-09-24 @ 09:04) (18 - 18)  SpO2: 99% (06-09-24 @ 09:04) (95% - 99%)  Wt(kg): --        06-08-24 @ 07:01  -  06-09-24 @ 07:00  --------------------------------------------------------  IN: 3348.6 mL / OUT: 8100 mL / NET: -1591.4 mL    06-09-24 @ 07:01  -  06-09-24 @ 09:30  --------------------------------------------------------  IN: 240 mL / OUT: 0 mL / NET: 240 mL      Physical Exam:      Gen: NAD, well-appearing; laying in bed   HEENT: PERRL,  Neck: trachea midline, no noted JVD  Chest: non labored breathing, equal chest expansion bilaterally  Abd: ND NT Soft, appropriate incisional tenderness, dressings clean and dry and intact; ostomy w liquid output  Ext: Warm, FROM, no edema b/l LE  Neuro: No focal deficits  Psych: Normal affect and mood  Skin: Warm, without rashes     .  .  .

## 2024-06-10 ENCOUNTER — TRANSCRIPTION ENCOUNTER (OUTPATIENT)
Age: 59
End: 2024-06-10

## 2024-06-10 VITALS
TEMPERATURE: 98 F | HEART RATE: 93 BPM | RESPIRATION RATE: 18 BRPM | OXYGEN SATURATION: 100 % | DIASTOLIC BLOOD PRESSURE: 79 MMHG | SYSTOLIC BLOOD PRESSURE: 117 MMHG

## 2024-06-10 LAB
ALBUMIN SERPL ELPH-MCNC: 3.1 G/DL — LOW (ref 3.3–5)
ALP SERPL-CCNC: 205 U/L — HIGH (ref 40–120)
ALT FLD-CCNC: 27 U/L — SIGNIFICANT CHANGE UP (ref 10–45)
ANION GAP SERPL CALC-SCNC: 11 MMOL/L — SIGNIFICANT CHANGE UP (ref 5–17)
AST SERPL-CCNC: 22 U/L — SIGNIFICANT CHANGE UP (ref 10–40)
BILIRUB SERPL-MCNC: 0.1 MG/DL — LOW (ref 0.2–1.2)
BUN SERPL-MCNC: 24 MG/DL — HIGH (ref 7–23)
CA-I BLD-SCNC: 1.22 MMOL/L — SIGNIFICANT CHANGE UP (ref 1.15–1.33)
CALCIUM SERPL-MCNC: 9.1 MG/DL — SIGNIFICANT CHANGE UP (ref 8.4–10.5)
CHLORIDE SERPL-SCNC: 106 MMOL/L — SIGNIFICANT CHANGE UP (ref 96–108)
CO2 SERPL-SCNC: 23 MMOL/L — SIGNIFICANT CHANGE UP (ref 22–31)
CREAT SERPL-MCNC: 0.54 MG/DL — SIGNIFICANT CHANGE UP (ref 0.5–1.3)
EGFR: 108 ML/MIN/1.73M2 — SIGNIFICANT CHANGE UP
GLUCOSE BLDC GLUCOMTR-MCNC: 119 MG/DL — HIGH (ref 70–99)
GLUCOSE BLDC GLUCOMTR-MCNC: 89 MG/DL — SIGNIFICANT CHANGE UP (ref 70–99)
GLUCOSE SERPL-MCNC: 97 MG/DL — SIGNIFICANT CHANGE UP (ref 70–99)
HCT VFR BLD CALC: 23.7 % — LOW (ref 34.5–45)
HGB BLD-MCNC: 7.8 G/DL — LOW (ref 11.5–15.5)
MAGNESIUM SERPL-MCNC: 2.2 MG/DL — SIGNIFICANT CHANGE UP (ref 1.6–2.6)
MCHC RBC-ENTMCNC: 31.2 PG — SIGNIFICANT CHANGE UP (ref 27–34)
MCHC RBC-ENTMCNC: 32.9 GM/DL — SIGNIFICANT CHANGE UP (ref 32–36)
MCV RBC AUTO: 94.8 FL — SIGNIFICANT CHANGE UP (ref 80–100)
NRBC # BLD: 0 /100 WBCS — SIGNIFICANT CHANGE UP (ref 0–0)
PHOSPHATE SERPL-MCNC: 3 MG/DL — SIGNIFICANT CHANGE UP (ref 2.5–4.5)
PLATELET # BLD AUTO: 319 K/UL — SIGNIFICANT CHANGE UP (ref 150–400)
POTASSIUM SERPL-MCNC: 4 MMOL/L — SIGNIFICANT CHANGE UP (ref 3.5–5.3)
POTASSIUM SERPL-SCNC: 4 MMOL/L — SIGNIFICANT CHANGE UP (ref 3.5–5.3)
PROT SERPL-MCNC: 5.5 G/DL — LOW (ref 6–8.3)
RBC # BLD: 2.5 M/UL — LOW (ref 3.8–5.2)
RBC # FLD: 14 % — SIGNIFICANT CHANGE UP (ref 10.3–14.5)
SODIUM SERPL-SCNC: 140 MMOL/L — SIGNIFICANT CHANGE UP (ref 135–145)
TRIGL SERPL-MCNC: 351 MG/DL — HIGH
VIT D25+D1,25 OH+D1,25 PNL SERPL-MCNC: 39.1 PG/ML — SIGNIFICANT CHANGE UP (ref 19.9–79.3)
WBC # BLD: 7.98 K/UL — SIGNIFICANT CHANGE UP (ref 3.8–10.5)
WBC # FLD AUTO: 7.98 K/UL — SIGNIFICANT CHANGE UP (ref 3.8–10.5)

## 2024-06-10 PROCEDURE — 82310 ASSAY OF CALCIUM: CPT

## 2024-06-10 PROCEDURE — 85027 COMPLETE CBC AUTOMATED: CPT

## 2024-06-10 PROCEDURE — 36415 COLL VENOUS BLD VENIPUNCTURE: CPT

## 2024-06-10 PROCEDURE — 82746 ASSAY OF FOLIC ACID SERUM: CPT

## 2024-06-10 PROCEDURE — 80048 BASIC METABOLIC PNL TOTAL CA: CPT

## 2024-06-10 PROCEDURE — 80061 LIPID PANEL: CPT

## 2024-06-10 PROCEDURE — 84466 ASSAY OF TRANSFERRIN: CPT

## 2024-06-10 PROCEDURE — 85610 PROTHROMBIN TIME: CPT

## 2024-06-10 PROCEDURE — 84100 ASSAY OF PHOSPHORUS: CPT

## 2024-06-10 PROCEDURE — 99233 SBSQ HOSP IP/OBS HIGH 50: CPT

## 2024-06-10 PROCEDURE — 86900 BLOOD TYPING SEROLOGIC ABO: CPT

## 2024-06-10 PROCEDURE — 83036 HEMOGLOBIN GLYCOSYLATED A1C: CPT

## 2024-06-10 PROCEDURE — C1751: CPT

## 2024-06-10 PROCEDURE — 97530 THERAPEUTIC ACTIVITIES: CPT

## 2024-06-10 PROCEDURE — C9399: CPT

## 2024-06-10 PROCEDURE — 82330 ASSAY OF CALCIUM: CPT

## 2024-06-10 PROCEDURE — 84134 ASSAY OF PREALBUMIN: CPT

## 2024-06-10 PROCEDURE — 82607 VITAMIN B-12: CPT

## 2024-06-10 PROCEDURE — 83970 ASSAY OF PARATHORMONE: CPT

## 2024-06-10 PROCEDURE — 83540 ASSAY OF IRON: CPT

## 2024-06-10 PROCEDURE — 85730 THROMBOPLASTIN TIME PARTIAL: CPT

## 2024-06-10 PROCEDURE — 97161 PT EVAL LOW COMPLEX 20 MIN: CPT

## 2024-06-10 PROCEDURE — 88309 TISSUE EXAM BY PATHOLOGIST: CPT

## 2024-06-10 PROCEDURE — 71045 X-RAY EXAM CHEST 1 VIEW: CPT

## 2024-06-10 PROCEDURE — 85025 COMPLETE CBC W/AUTO DIFF WBC: CPT

## 2024-06-10 PROCEDURE — 82962 GLUCOSE BLOOD TEST: CPT

## 2024-06-10 PROCEDURE — 86850 RBC ANTIBODY SCREEN: CPT

## 2024-06-10 PROCEDURE — 82728 ASSAY OF FERRITIN: CPT

## 2024-06-10 PROCEDURE — 97112 NEUROMUSCULAR REEDUCATION: CPT

## 2024-06-10 PROCEDURE — 83550 IRON BINDING TEST: CPT

## 2024-06-10 PROCEDURE — 82652 VIT D 1 25-DIHYDROXY: CPT

## 2024-06-10 PROCEDURE — 83735 ASSAY OF MAGNESIUM: CPT

## 2024-06-10 PROCEDURE — 36569 INSJ PICC 5 YR+ W/O IMAGING: CPT

## 2024-06-10 PROCEDURE — 85045 AUTOMATED RETICULOCYTE COUNT: CPT

## 2024-06-10 PROCEDURE — 80053 COMPREHEN METABOLIC PANEL: CPT

## 2024-06-10 PROCEDURE — 86901 BLOOD TYPING SEROLOGIC RH(D): CPT

## 2024-06-10 PROCEDURE — 82306 VITAMIN D 25 HYDROXY: CPT

## 2024-06-10 PROCEDURE — 84478 ASSAY OF TRIGLYCERIDES: CPT

## 2024-06-10 PROCEDURE — 97116 GAIT TRAINING THERAPY: CPT

## 2024-06-10 RX ORDER — ELECTROLYTE SOLUTION,INJ
1 VIAL (ML) INTRAVENOUS
Qty: 0 | Refills: 0 | DISCHARGE
Start: 2024-06-10

## 2024-06-10 RX ORDER — PSYLLIUM SEED (WITH DEXTROSE)
3.4 POWDER (GRAM) ORAL
Qty: 14 | Refills: 0
Start: 2024-06-10 | End: 2024-06-23

## 2024-06-10 RX ORDER — I.V. FAT EMULSION 20 G/100ML
45 EMULSION INTRAVENOUS
Qty: 0 | Refills: 0 | DISCHARGE
Start: 2024-06-10

## 2024-06-10 RX ORDER — DIPHENOXYLATE HCL/ATROPINE 2.5-.025MG
2 TABLET ORAL
Qty: 112 | Refills: 0
Start: 2024-06-10 | End: 2024-06-23

## 2024-06-10 RX ORDER — LOPERAMIDE HCL 2 MG
2 TABLET ORAL
Qty: 112 | Refills: 0
Start: 2024-06-10 | End: 2024-06-23

## 2024-06-10 RX ADMIN — Medication 4 MILLIGRAM(S): at 08:25

## 2024-06-10 RX ADMIN — CHLORHEXIDINE GLUCONATE 1 APPLICATION(S): 213 SOLUTION TOPICAL at 08:40

## 2024-06-10 RX ADMIN — HEPARIN SODIUM 5000 UNIT(S): 5000 INJECTION INTRAVENOUS; SUBCUTANEOUS at 05:09

## 2024-06-10 RX ADMIN — Medication 400 MILLIGRAM(S): at 08:25

## 2024-06-10 RX ADMIN — Medication 2 TABLET(S): at 08:25

## 2024-06-10 RX ADMIN — Medication 400 MILLIGRAM(S): at 08:55

## 2024-06-10 RX ADMIN — Medication 4 MILLIGRAM(S): at 12:00

## 2024-06-10 RX ADMIN — Medication 650 MILLIGRAM(S): at 12:00

## 2024-06-10 RX ADMIN — Medication 650 MILLIGRAM(S): at 05:45

## 2024-06-10 RX ADMIN — Medication 650 MILLIGRAM(S): at 05:09

## 2024-06-10 RX ADMIN — Medication 650 MILLIGRAM(S): at 12:35

## 2024-06-10 RX ADMIN — Medication 1 PACKET(S): at 12:01

## 2024-06-10 RX ADMIN — Medication 2 TABLET(S): at 12:01

## 2024-06-10 NOTE — ADVANCED PRACTICE NURSE CONSULT - ASSESSMENT
Chart reviewed & events noted to date. D/c planning for home today. Pt tolerating low fiber diet, ileostomy functioning (+air/+brown liquid stool which is thickening). Reviewed steps for emptying pouch using drainable pouch as output has been high & w/liquid consistency. Stool now thickening (975 cc/24hr). Pt burped pouch again & asked questions. Pt did well w/good teach back. Demonstrated steps for drainable pouch (provided tips/tricks for emptying). Pt independent w/emptying pouch (high output pouch currently in place).     Complete pouching system change: loop ileostomy, RLQ,  1 3/8", pink & viable. Peristomal skin & mucocutaneous junction intact. Stoma budded. Pt performed complete pouching system independently w/minimal hands-on care & verbal cues from this CWOCN. Pt performed change by removal of pouching system using pull and push technique, cleansing skin with water, patting dry. Repouched w/Lavelle 2 1/4" skin barrier, stoma paste to opening at back of skin barrier, & drainable pouch. Taught pt how to properly assess stoma viability and peristomal skin. Demonstrated steps for using stoma powder/no sting barrier film wipe if skin developed irritation. Patient participated in stoma measurement, creating template (off set to avoid midline incision w/steri strips), cutting wafer, applying stoma paste & applying pouch. Pt did extremely well but remains slightly nervous. Reassured pt that homecare to f/u to reinforce teaching.   Discussed & reviewed ostomy discharge instructions regarding ostomy care, potential complications to report & seek help from the healthcare team (surgeon/CWOCN), & transitioning to home care. Discussed & reviewed product #s, supply info, ordering process & support group info. Pt & staff aware of plan of care. Supplies/pattern provided along w/reviewed educ'l material at bedside. Contact info provided for pt to f/u as needed. Support given to pt throughout ostomy lesson.  Chart reviewed & events noted to date. D/c planning for home today. Pt tolerating low fiber diet, ileostomy functioning (+air/+brown liquid stool which is thickening). Reviewed steps for emptying pouch using drainable pouch as output has been high & w/liquid consistency. Stool now thickening (975 cc/24hr). Pt burped pouch again & asked questions. Pt did well w/good teach back. Demonstrated steps for drainable pouch (provided tips/tricks for emptying). Pt independent w/emptying pouch (high output pouch currently in place).   Pt instructed to measure & record 24 hr stoma output. Report to surgeon if > 1500cc or < 400cc/24hr. Container to measure, chart to record daily output & written instructions provided.  Complete pouching system change: loop ileostomy, RLQ,  1 3/8", pink & viable. Peristomal skin & mucocutaneous junction intact. Stoma budded. Pt performed complete pouching system independently w/minimal hands-on care & verbal cues from this CWOCN. Pt performed change by removal of pouching system using pull and push technique, cleansing skin with water, patting dry. Repouched w/Lavelle 2 1/4" skin barrier, stoma paste to opening at back of skin barrier, & drainable pouch. Taught pt how to properly assess stoma viability and peristomal skin. Demonstrated steps for using stoma powder/no sting barrier film wipe if skin developed irritation. Patient participated in stoma measurement, creating template (off set to avoid midline incision w/steri strips), cutting wafer, applying stoma paste & applying pouch. Pt did extremely well but remains slightly nervous. Reassured pt that homecare to f/u to reinforce teaching.   Discussed & reviewed ostomy discharge instructions regarding ostomy care, potential complications to report & seek help from the healthcare team (surgeon/CWOCN), & transitioning to home care. Discussed & reviewed product #s, supply info, ordering process & support group info. Pt & staff aware of plan of care. Supplies/pattern provided along w/reviewed educ'l material at bedside. Contact info provided for pt to f/u as needed. Support given to pt throughout ostomy lesson.

## 2024-06-10 NOTE — PROGRESS NOTE ADULT - SUBJECTIVE AND OBJECTIVE BOX
[facility] NUTRITION SUPPORT-- FOLLOW UP NOTE      24 hour events/subjective: Patient seen and examined at bedside, chart reviewed and events noted and I's and O's reviewed.  Patient denies chest pain, shortness of breath, nausea or vomiting, dizziness, chills at time of visit.  Patient's VSS and no other acute overnight events noted.   Patient continues on TPN and glucose trend is within appropriate range.  Patient planned for d/c home today with TPN      ROS:  Except as noted above, all other systems reviewed and are negative     Diet:  Diet, Low Fiber (06-02-24 @ 08:58)      PAST HISTORY  --------------------------------------------------------------------------------  PAST MEDICAL & SURGICAL HISTORY:  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      History of Crohn's disease      Status post small bowel resection      History of colonoscopy      History of partial hysterectomy      History of bladder surgery      NVD (normal vaginal delivery)        No significant changes to PMH, PSH, FHx, SHx, unless otherwise noted    ALLERGIES & MEDICATIONS  --------------------------------------------------------------------------------  Allergies    No Known Allergies    Intolerances      Standing Inpatient Medications  acetaminophen     Tablet .. 650 milliGRAM(s) Oral every 6 hours  chlorhexidine 4% Liquid 1 Application(s) Topical <User Schedule>  dextrose 10% Bolus 125 milliLiter(s) IV Bolus once  dextrose 5%. 1000 milliLiter(s) IV Continuous <Continuous>  dextrose 5%. 1000 milliLiter(s) IV Continuous <Continuous>  dextrose 50% Injectable 25 Gram(s) IV Push once  dextrose 50% Injectable 12.5 Gram(s) IV Push once  diphenoxylate/atropine 2 Tablet(s) Oral four times a day  glucagon  Injectable 1 milliGRAM(s) IntraMuscular once  heparin   Injectable 5000 Unit(s) SubCutaneous every 8 hours  ibuprofen  Tablet. 400 milliGRAM(s) Oral every 6 hours  insulin lispro (ADMELOG) corrective regimen sliding scale   SubCutaneous four times a day before meals  lactated ringers. 1000 milliLiter(s) IV Continuous <Continuous>  lipid, fat emulsion (Fish Oil and Plant Based) 20% Infusion 18.8 mL/Hr IV Continuous <Continuous>  loperamide 4 milliGRAM(s) Oral four times a day  Parenteral Nutrition - Adult 1 Each TPN Continuous <Continuous>  psyllium Powder 1 Packet(s) Oral daily    PRN Inpatient Medications  dextrose Oral Gel 15 Gram(s) Oral once PRN  sodium chloride 0.9% lock flush 10 milliLiter(s) IV Push every 1 hour PRN        VITALS/PHYSICAL EXAM  --------------------------------------------------------------------------------  T(C): 36.6 (06-10-24 @ 12:29), Max: 36.8 (06-10-24 @ 04:10)  HR: 93 (06-10-24 @ 12:29) (85 - 99)  BP: 117/79 (06-10-24 @ 12:29) (103/72 - 125/77)  RR: 18 (06-10-24 @ 12:29) (18 - 18)  SpO2: 100% (06-10-24 @ 12:29) (95% - 100%)  Wt(kg): --      06-09-24 @ 07:01  -  06-10-24 @ 07:00  --------------------------------------------------------  IN: 5125.6 mL / OUT: 4975 mL / NET: 150.6 mL    06-10-24 @ 07:01  -  06-10-24 @ 12:40  --------------------------------------------------------  IN: 200 mL / OUT: 575 mL / NET: -375 mL      I&O's Detail    09 Jun 2024 07:01  -  10 José Luis 2024 07:00  --------------------------------------------------------  IN:    Fat Emulsion (Fish Oil &amp; Plant Based) 20% Infusion: 225.6 mL    Lactated Ringers: 600 mL    Oral Fluid: 1280 mL    TPN (Total Parenteral Nutrition): 3020 mL  Total IN: 5125.6 mL    OUT:    Ileostomy (mL): 975 mL    Voided (mL): 4000 mL  Total OUT: 4975 mL    Total NET: 150.6 mL      10 José Luis 2024 07:01  -  10 José Luis 2024 12:40  --------------------------------------------------------  IN:    Oral Fluid: 200 mL  Total IN: 200 mL    OUT:    Ileostomy (mL): 75 mL    Voided (mL): 500 mL  Total OUT: 575 mL    Total NET: -375 mL          Physical Exam:  Gen: NAD, well-appearin  Neck: trachea midline, no noted JVD  Chest: non labored breathing, equal chest expansion bilaterally  Abd: ND NT Soft, appropriate incisional tenderness, dressings clean and dry and intact; ostomy w liquid output  Ext: Warm, FROM, no edema b/l LE  Neuro: No focal deficits  Psych: Normal affect and mood  Skin: Warm, without rashes       LABS/STUDIES  --------------------------------------------------------------------------------              7.8    7.98  >-----------<  319      [06-10-24 @ 05:54]              23.7     140  |  106  |  24  ----------------------------<  97      [06-10-24 @ 05:54]  4.0   |  23  |  0.54        Ca     9.1     [06-10-24 @ 05:54]      iCa    1.22     [06-10 @ 05:54]      Mg     2.2     [06-10-24 @ 05:54]      Phos  3.0     [06-10-24 @ 05:54]    TPro  5.5  /  Alb  3.1  /  TBili  0.1  /  DBili  x   /  AST  22  /  ALT  27  /  AlkPhos  205  [06-10-24 @ 05:54]          Ca ionized  Creatinine Trend:  POC glucoseGlucose: 97 mg/dL (06-10-24 @ 05:54)  CAPILLARY BLOOD GLUCOSE      POCT Blood Glucose.: 119 mg/dL (10 José Luis 2024 12:23)  POCT Blood Glucose.: 89 mg/dL (10 José Luis 2024 08:35)  POCT Blood Glucose.: 119 mg/dL (09 Jun 2024 21:03)  POCT Blood Glucose.: 97 mg/dL (09 Jun 2024 17:58)    PrealbuminPrealbumin, Serum: 25 mg/dL (06-07-24 @ 06:36)    Triglycerides

## 2024-06-10 NOTE — PROGRESS NOTE ADULT - PROVIDER SPECIALTY LIST ADULT
Anesthesia
Anesthesia
Colorectal Surgery
Colorectal Surgery
Nutrition Support
Surgery
Surgery
Anesthesia
Colorectal Surgery
Colorectal Surgery
Surgery
Nutrition Support
Surgery

## 2024-06-10 NOTE — DISCHARGE NOTE NURSING/CASE MANAGEMENT/SOCIAL WORK - PATIENT PORTAL LINK FT
You can access the FollowMyHealth Patient Portal offered by [facility] by registering at the following website: [email or web address]. By joining [email or web address] [email or web address] portal, you will also be able to view your health information using other applications (apps) compatible with our system.

## 2024-06-10 NOTE — DISCHARGE NOTE NURSING/CASE MANAGEMENT/SOCIAL WORK - NSDCPEFALRISK_GEN_ALL_CORE
For information on Fall & Injury Prevention, visit: [email or web address] OR  [email or web address] OR  https://www.cdc.gov/steadi/patient.html

## 2024-06-10 NOTE — PROGRESS NOTE ADULT - SUBJECTIVE AND OBJECTIVE BOX
SURGERY DAILY PROGRESS NOTE:     Overnight Events:  No acute events overnight.    SUBJECTIVE: Patient seen and evaluated on AM rounds. Pt is resting comfortably in bed with no complaints. Denies fever, chills, N/V, chest pain, or shortness of breath. Patient is passing gas and having bowel movements via ostomy. Tolerating diet. Ambulating well. Pain is adequately controlled on current regimen.    OBJECTIVE:  Vital Signs Last 24 Hrs  T(C): 36.8 (10 José Luis 2024 04:10), Max: 37.1 (09 Jun 2024 09:04)  T(F): 98.2 (10 José Luis 2024 04:10), Max: 98.8 (09 Jun 2024 09:04)  HR: 93 (10 José Luis 2024 04:10) (85 - 99)  BP: 103/72 (10 José Luis 2024 04:10) (103/72 - 125/77)  BP(mean): --  RR: 18 (10 José Luis 2024 04:10) (18 - 18)  SpO2: 97% (10 José Luis 2024 04:10) (95% - 99%)    Parameters below as of 10 José Luis 2024 04:10  Patient On (Oxygen Delivery Method): room air      I&O's Detail    09 Jun 2024 07:01  -  10 José Luis 2024 07:00  --------------------------------------------------------  IN:    Fat Emulsion (Fish Oil &amp; Plant Based) 20% Infusion: 225.6 mL    Lactated Ringers: 600 mL    Oral Fluid: 1280 mL    TPN (Total Parenteral Nutrition): 3020 mL  Total IN: 5125.6 mL    OUT:    Ileostomy (mL): 975 mL    Voided (mL): 4000 mL  Total OUT: 4975 mL    Total NET: 150.6 mL        Daily     Daily     LABS:                        7.8    7.98  )-----------( 319      ( 10 José Luis 2024 05:54 )             23.7     06-10    140  |  106  |  24<H>  ----------------------------<  97  4.0   |  23  |  0.54    Ca    9.1      10 José Luis 2024 05:54  Phos  3.0     06-10  Mg     2.2     06-10    TPro  5.5<L>  /  Alb  3.1<L>  /  TBili  0.1<L>  /  DBili  x   /  AST  22  /  ALT  27  /  AlkPhos  205<H>  06-10      Urinalysis Basic - ( 10 José Luis 2024 05:54 )    Color: x / Appearance: x / SG: x / pH: x  Gluc: 97 mg/dL / Ketone: x  / Bili: x / Urobili: x   Blood: x / Protein: x / Nitrite: x   Leuk Esterase: x / RBC: x / WBC x   Sq Epi: x / Non Sq Epi: x / Bacteria: x        Physical Exam:  General: NAD  Respiratory: respirations non labored  Abdominal: Soft, non-distended, NT.  Incisions clean and dry and intact; ostomy w liquid output

## 2024-06-10 NOTE — PROGRESS NOTE ADULT - NUTRITIONAL ASSESSMENT
This patient has been assessed with a concern for Malnutrition and has been determined to have a diagnosis/diagnoses of Underweight (BMI < 19).    This patient is being managed with:   Parenteral Nutrition - Adult-  Entered: Jun 8 2024  5:00PM    Diet Low Fiber-  Entered: Jun 2 2024  8:58AM  
This patient has been assessed with a concern for Malnutrition and has been determined to have a diagnosis/diagnoses of Underweight (BMI < 19).    This patient is being managed with:   lipid fat emulsion (Fish Oil and Plant Based) 20% Infusion-[Known as SMOFLIPID 20% Infusion]  45 Gram(s) in IV Solution 225 milliLiter(s) infuse at 18.8 mL/Hr  Dose Rate: 18.8 mL/Hr Infuse Over: 12 Hours  Administration Instructions: Use 1.2 micron in-line filter  Entered: Jun 9 2024 12:34PM    Parenteral Nutrition - Adult-  Entered: Jun 9 2024 12:34PM    Diet Low Fiber-  Entered: Jun 2 2024  8:58AM  
This patient has been assessed with a concern for Malnutrition and has been determined to have a diagnosis/diagnoses of Underweight (BMI < 19).    This patient is being managed with:   Parenteral Nutrition - Adult-  Entered: Jun 6 2024  5:00PM    Diet Low Fiber-  Entered: Jun 2 2024  8:58AM

## 2024-06-10 NOTE — DISCHARGE NOTE NURSING/CASE MANAGEMENT/SOCIAL WORK - NSSCTYPOFSERV_GEN_ALL_CORE
Skilled nursing.  [facility] scheduled to start this evening between 7-9pm.  RN at [facility] will contact you prior to initial visit tomorrow 6/11.

## 2024-06-10 NOTE — ADVANCED PRACTICE NURSE CONSULT - RECOMMEDATIONS
Will recommend:              1. Staff to reinforce ostomy teaching.    2. Empty pouch when 1/3- 1/2 full.   3. Change pouching 2x a week or as needed (leakage)   4. Contact ostomy RN for any stoma issues, concerns, questions.   5. Supplies provided; [place] 2 1/4" flat skin barrier, 74124, pouch 94558, stoma powder 7906, cavilon barrier film wipe 9314       
Will recommend:  1. Monitor output  2. Empty pouch when 1/3-1/2 full  3. Change pouching system every 3-4 days & prn leakage   4. Encourage self care-independent.  5. Contact ostomy specialist if questions/issues arise
Emotional support and encouragement provided throughout visit & and discussed homecare RN to f/u upon d/c home.   Will recommend:  1. Monitor output  2. Empty pouch when 1/3-1/2 full  3. Change pouching system every 3-4 days & prn leakage  4. Reinforce ostomy teaching w/patient  5. Contact CWOCNs if questions/issues arise.  6. Supplies: [place] 2 1/4" Ceraplus flat skin barrier (#10015), Lavelle 2 1/4" drainable pouch (#88600) & High output pouch #58188; Accessory products:  stoma paste #565409, stoma powder (#4997) & Cavilon No sting barrier film wipe (#9466), stoma belt #1378
Recommendation:              1. Staff to reinforce ostomy teaching.    2. Empty pouch when 1/3- 1/2 full.   3. Change pouching 2x a week or as needed (leakage)   4. Contact ostomy RN for any stoma issues, concerns, questions.   5. Supplies provided; Lavelle 2 1/4" flat skin barrier, 59953, pouch 68387, stoma powder 7906, cavilon barrier film wipe 2844     
Will recommend:  1. Monitor output  2. Empty pouch when 1/3-1/2 full  3. Change pouching system every 3-4 days & prn leakage  4. Reinforce ostomy teaching w/patient  5. Contact CWOCNs if questions/issues arise.  6. Supplies: [place] 2 1/4" Ceraplus flat skin barrier (#19006), Lavelle 2 1/4" drainable pouch (#60019); Accessory products:  stoma paste #989998, stoma powder (#8332) & Cavilon No sting barrier film wipe (#0739), stoma belt #0713  
 Will recommend:  1. Encourage self care  2. Empty pouch when 1/3-1/2 full   3. Change pouching system every 3-4 days & prn leakage  4. Reinforce ostomy teaching w/patient  5. Contact ostomy specialists if questions, concerns/issues .  6. Supplies: Lavelle 2 1/4" Ceraplus skin barrier (#83739), Lavelle 2 1/4" drainable pouch (#90193); Accessory products:  stoma paste #059628, stoma powder (#9670) & Cavilon No sting barrier film wipe (#5448)  Will f/u. Support & encouragement provided throughout visit.        
Will recommend:              1. Staff to reinforce ostomy teaching.    2. Empty pouch when 1/3- 1/2 full.   3. Change pouching 2x a week or as needed (leakage)   4. Contact ostomy RN for any stoma issues, concerns, questions.   5. Supplies provided; Lavelle 2 1/4" flat skin barrier, 99967, pouch 91727, stoma powder 7906, cavilon barrier film wipe 2941         
Will recommend:  1. Monitor output  2. Empty pouch when 1/3-1/2 full  3. Change pouching system every 3-4 days & prn leakage  4. Reinforce ostomy teaching w/patient  5. Contact CWOCNs if questions/issues arise.  6. Supplies: [place] 2 1/4" Ceraplus flat skin barrier (#23582), Lavelle 2 1/4" drainable pouch (#96481) currently in high output pouch due to increase output (#45462); Accessory products:  stoma paste #871040, stoma powder (#8450) & Cavilon No sting barrier film wipe (#7054)

## 2024-06-10 NOTE — PROGRESS NOTE ADULT - ASSESSMENT
58yr old female presents to [facility] for a scheduled Laparoscopic Small Bowel Resection Stricturoplasty and Laparoscopic Ileostomy Creation on 5/29/2024. PMH of Crohn's dx in the small bowel (dx'd in 2004 Dr. Betts) s/p lap SBR 2004 for a SBO. She's now s/p Laparoscopic Small Bowel Resection Stricturoplasty and Laparoscopic Ileostomy Creation on 5/29/2024. Recovering well. Spivey removed 5/31, passed TOV. Diet advanced to LRD; however, patient continued to have high ostomy output. PICC placed 6/6 for TPN.     Plan:  - Advance to LRD + TPN  - TPN compressed to 12 hrs  - Pain control with PO meds   - Ostomy teaching done  - Imodium 4mg QID, Lomotil 1 tab QID, and Metamucil for high ostomy output   - PT- No PT required  - DVT ppx  - Dispo: DCP home with TPN on Monday       [facility]  [id number]

## 2024-06-10 NOTE — ADVANCED PRACTICE NURSE CONSULT - REASON FOR CONSULT
Bedside picc order placed.   Indication: DL SOLO picc placement for TPN    
Continue ostomy teaching
Education, new pt (5/29/24)  Complete pouching system change.  Remove stoma support yolanda as ordered.
Ongoing ostomy education & emotional support & encouragement
Vascular Access Team    Evaluation for: Bedside DL PICC placement  Requested by name: Alma Delia Rowley  Date/Time: 6/6@09:18    Indication: TPN  Allergy to CHG or Heparin or Lidocaine: nka    Platelets(>20): 286  INR(<3): 0.95  eGFR(>40): 102  Blood cultures sent: no  Blood culture negative in 48hrs: n/a  Anticoagulants/ antipletelets: Heparin SQ  Arms DVT/ Mastectomy/ Fistula/ PPM/ Defib: no  IR or Nephrology or ID clearance needed: no    Consent obtained: no    Pending: consent, TPN approval    Plan: Bedside picc order evaluated. Please obtain PICC placement consent and TPN approval  and then call [phone] for the VAT RN.  
Education, new patient (s/p loop ileostomy 5/9/24).    Pt is a 58yr old female with PMH of Crohn's dx in the small bowel (dx'd in 2004 Dr. Betts) s/p lap SBR 2004 for a SBO. Now s/p lap ileocolic resection for strictures at ileocolic anastomosis and proxima segmental stricture 20cm, creation of DLI. Tolerated procedure well. PACU labs H&H stable, mild electrolyte derangement. Pt is now on 2M.    
Ongoing education, new pt ( Loop ileostomy 5/29/24)  Complete pouching system change.                   
Ongoing ostomy teaching
Ongoing education, new pt ( Loop ileostomy 5/29/24)  Complete pouching system change.          
Ongoing ostomy education, d/c planning to home today.

## 2024-06-10 NOTE — PROGRESS NOTE ADULT - ASSESSMENT
58yr old female with PMH of Crohn's dx in the small bowel (dx'd in 2004 Dr. Betts) s/p lap SBR 2004 for a SBO.  Patient admitted on 5/29/24 and underwent Lap ileocolic resection w/ anastamosis, DLI creation on 5/29/2024.  TPN team consulted on 6/6/24 due to high ileostomy output.      GOAL PN: 90 g amino acids, 210g dextrose, 45g SMOF lipid; to provide 1524 kcal/day (34.7kcal/kg and 2 g/kg protein based on dosing wt 43.8 kg)    - Nutritional Assessment, patient not meeting nutritional goals enterally  and would benefit from TPN for nutritional support given concern for protein calorie malnutrition due to high ileostomy output; Prealbumin 25 mg/dL - trend weekly   - TPN dosing for discharge:  AA 90 g, Dex 210 G, SMOF 30G, NaCl 40 mEq, Na Acetate 60 mEq, NaPhos 45 mmol, KCl 40 mEq, CaGlu 5 mEq, Mg 12 mEq, TE 1 mL, MVI 10 mL at volume of 2400 mL over 12 hours  - TPN access:  DL PICC in place; maintain per protocol and keep one port reserved for TPN only   - Electrolyte Imbalance risk- check CMP, Mg, Phos, iCa daily, will adjust in TPN as needed  - Anemia:  Iron studies, B12, folate reviewed; trend h/h   - Hypercalcemia, h/o hypocalcemia:  f/u Vitamin D, 1,25 work up for primary hyperparathyroidism , decrease to 5 Meq CaGlu; further management per Dr Farnsworth   - Risk of hypertriglyceridemia:  mg/dL on 6/7 --> 351 mg/dL on 6/10 --> decrease SMOF to 30 G; plan to trend TG closely while on TPN via dr Farnsworth   - Risk of hyperglycemia: HgA1c 4.9%; continue fingersticks every 6 hours with RISS coverage to monitor glucose trend; no RI in TPN, can check FS BID if still stable tomorrow.  - Outpatient follow up with Dr Saroj Farnsworth; TPN via [facility]; signout provided to both:    [id number]  [place] [id number]  Entrance on [address]  Phone [phone]    Please instruct patient when calling to make the appointment to schedule for the first MONDAY after discharge from hospital/rehab.  Please instruct patient to explain he/she is on TPN when making the appointment. Check labs, CMP, electrolytes, ionized Calcium, triglycerides and fingersticks- frequency to be determined by Dr. Farnsworth as outpatient.    - Strict Intake and Output; weight checks three times a week.  - Further care per [facility] team    Available on TEAMS  TPN spectra [id number] ([phone] when dialing from outside line)  M-F 8A-2P, Weekends and holidays 8/9A-12/1P  Discussed with Dr. Brian Coleman

## 2024-06-11 PROBLEM — Z87.19 PERSONAL HISTORY OF OTHER DISEASES OF THE DIGESTIVE SYSTEM: Chronic | Status: ACTIVE | Noted: 2024-05-23

## 2024-06-18 PROBLEM — K50.813 CROHN'S DISEASE OF BOTH SMALL AND LARGE INTESTINE WITH FISTULA: Chronic | Status: ACTIVE | Noted: 2024-05-23

## 2024-06-22 ENCOUNTER — TRANSCRIPTION ENCOUNTER (OUTPATIENT)
Age: 59
End: 2024-06-22

## 2024-06-25 ENCOUNTER — APPOINTMENT (OUTPATIENT)
Dept: COLORECTAL SURGERY | Facility: CLINIC | Age: 59
End: 2024-06-25
Payer: COMMERCIAL

## 2024-06-25 VITALS
DIASTOLIC BLOOD PRESSURE: 98 MMHG | BODY MASS INDEX: 20.22 KG/M2 | OXYGEN SATURATION: 98 % | SYSTOLIC BLOOD PRESSURE: 147 MMHG | RESPIRATION RATE: 15 BRPM | HEART RATE: 95 BPM | WEIGHT: 103 LBS | HEIGHT: 60 IN

## 2024-06-25 DIAGNOSIS — Z98.890 OTHER SPECIFIED POSTPROCEDURAL STATES: ICD-10-CM

## 2024-06-25 DIAGNOSIS — K50.90 CROHN'S DISEASE, UNSPECIFIED, W/OUT COMPLICATIONS: ICD-10-CM

## 2024-06-25 DIAGNOSIS — Z43.2 ENCOUNTER FOR ATTENTION TO ILEOSTOMY: ICD-10-CM

## 2024-06-25 PROCEDURE — 99024 POSTOP FOLLOW-UP VISIT: CPT

## 2024-06-25 RX ORDER — LOPERAMIDE HYDROCHLORIDE 2 MG/1
2 TABLET ORAL
Qty: 240 | Refills: 2 | Status: ACTIVE | COMMUNITY
Start: 2024-06-25 | End: 1900-01-01

## 2024-06-25 RX ORDER — DIPHENOXYLATE HYDROCHLORIDE AND ATROPINE SULFATE 2.5; .025 MG/1; MG/1
2.5-0.025 TABLET ORAL
Qty: 240 | Refills: 0 | Status: ACTIVE | COMMUNITY
Start: 2024-06-25 | End: 1900-01-01

## 2024-07-08 ENCOUNTER — APPOINTMENT (OUTPATIENT)
Dept: RADIOLOGY | Facility: HOSPITAL | Age: 59
End: 2024-07-08

## 2024-07-08 ENCOUNTER — APPOINTMENT (OUTPATIENT)
Dept: MRI IMAGING | Facility: CLINIC | Age: 59
End: 2024-07-08
Payer: COMMERCIAL

## 2024-07-08 ENCOUNTER — OUTPATIENT (OUTPATIENT)
Dept: OUTPATIENT SERVICES | Facility: HOSPITAL | Age: 59
LOS: 1 days | End: 2024-07-08
Payer: COMMERCIAL

## 2024-07-08 DIAGNOSIS — K50.90 CROHN'S DISEASE, UNSPECIFIED, WITHOUT COMPLICATIONS: ICD-10-CM

## 2024-07-08 DIAGNOSIS — K50.813 CROHN'S DISEASE OF BOTH SMALL AND LARGE INTESTINE WITH FISTULA: ICD-10-CM

## 2024-07-08 DIAGNOSIS — Z98.890 OTHER SPECIFIED POSTPROCEDURAL STATES: Chronic | ICD-10-CM

## 2024-07-08 DIAGNOSIS — Z90.49 ACQUIRED ABSENCE OF OTHER SPECIFIED PARTS OF DIGESTIVE TRACT: Chronic | ICD-10-CM

## 2024-07-08 DIAGNOSIS — Z90.711 ACQUIRED ABSENCE OF UTERUS WITH REMAINING CERVICAL STUMP: Chronic | ICD-10-CM

## 2024-07-08 DIAGNOSIS — Z00.00 ENCOUNTER FOR GENERAL ADULT MEDICAL EXAMINATION WITHOUT ABNORMAL FINDINGS: ICD-10-CM

## 2024-07-08 DIAGNOSIS — Z43.2 ENCOUNTER FOR ATTENTION TO ILEOSTOMY: ICD-10-CM

## 2024-07-08 PROCEDURE — A9585: CPT

## 2024-07-08 PROCEDURE — 72197 MRI PELVIS W/O & W/DYE: CPT

## 2024-07-08 PROCEDURE — 74270 X-RAY XM COLON 1CNTRST STD: CPT | Mod: 26

## 2024-07-08 PROCEDURE — 72197 MRI PELVIS W/O & W/DYE: CPT | Mod: 26

## 2024-07-08 PROCEDURE — 74270 X-RAY XM COLON 1CNTRST STD: CPT

## 2024-07-11 RX ORDER — RISANKIZUMAB-RZAA 60 MG/ML
600 INJECTION INTRAVENOUS
Refills: 0 | Status: ACTIVE | OUTPATIENT
Start: 2024-07-10 | End: 1900-01-01

## 2024-07-15 ENCOUNTER — APPOINTMENT (OUTPATIENT)
Dept: RHEUMATOLOGY | Facility: CLINIC | Age: 59
End: 2024-07-15
Payer: COMMERCIAL

## 2024-07-15 VITALS
SYSTOLIC BLOOD PRESSURE: 117 MMHG | OXYGEN SATURATION: 98 % | HEART RATE: 78 BPM | TEMPERATURE: 98 F | DIASTOLIC BLOOD PRESSURE: 70 MMHG | RESPIRATION RATE: 17 BRPM

## 2024-07-15 VITALS
RESPIRATION RATE: 16 BRPM | HEART RATE: 83 BPM | SYSTOLIC BLOOD PRESSURE: 108 MMHG | TEMPERATURE: 98 F | DIASTOLIC BLOOD PRESSURE: 69 MMHG | OXYGEN SATURATION: 98 %

## 2024-07-15 PROCEDURE — 96365 THER/PROPH/DIAG IV INF INIT: CPT

## 2024-07-15 RX ORDER — RISANKIZUMAB-RZAA 60 MG/ML
600 INJECTION INTRAVENOUS
Qty: 0 | Refills: 0 | Status: COMPLETED
Start: 2024-07-10

## 2024-07-17 ENCOUNTER — NON-APPOINTMENT (OUTPATIENT)
Age: 59
End: 2024-07-17

## 2024-07-24 ENCOUNTER — RX RENEWAL (OUTPATIENT)
Age: 59
End: 2024-07-24

## 2024-07-30 ENCOUNTER — NON-APPOINTMENT (OUTPATIENT)
Age: 59
End: 2024-07-30

## 2024-07-30 ENCOUNTER — OUTPATIENT (OUTPATIENT)
Dept: OUTPATIENT SERVICES | Facility: HOSPITAL | Age: 59
LOS: 1 days | Discharge: HOME | End: 2024-07-30

## 2024-07-30 DIAGNOSIS — Z90.711 ACQUIRED ABSENCE OF UTERUS WITH REMAINING CERVICAL STUMP: Chronic | ICD-10-CM

## 2024-07-30 DIAGNOSIS — Z98.890 OTHER SPECIFIED POSTPROCEDURAL STATES: Chronic | ICD-10-CM

## 2024-07-30 DIAGNOSIS — Z90.49 ACQUIRED ABSENCE OF OTHER SPECIFIED PARTS OF DIGESTIVE TRACT: Chronic | ICD-10-CM

## 2024-07-30 DIAGNOSIS — D64.9 ANEMIA, UNSPECIFIED: ICD-10-CM

## 2024-07-31 ENCOUNTER — RESULT REVIEW (OUTPATIENT)
Age: 59
End: 2024-07-31

## 2024-07-31 ENCOUNTER — APPOINTMENT (OUTPATIENT)
Dept: HEMATOLOGY ONCOLOGY | Facility: CLINIC | Age: 59
End: 2024-07-31
Payer: COMMERCIAL

## 2024-07-31 ENCOUNTER — APPOINTMENT (OUTPATIENT)
Dept: COLORECTAL SURGERY | Facility: CLINIC | Age: 59
End: 2024-07-31

## 2024-07-31 VITALS
WEIGHT: 107.5 LBS | BODY MASS INDEX: 21.1 KG/M2 | RESPIRATION RATE: 16 BRPM | HEIGHT: 60 IN | SYSTOLIC BLOOD PRESSURE: 130 MMHG | OXYGEN SATURATION: 100 % | HEART RATE: 81 BPM | DIASTOLIC BLOOD PRESSURE: 80 MMHG | TEMPERATURE: 98.4 F

## 2024-07-31 DIAGNOSIS — D50.8 OTHER IRON DEFICIENCY ANEMIAS: ICD-10-CM

## 2024-07-31 DIAGNOSIS — K50.90 CROHN'S DISEASE, UNSPECIFIED, W/OUT COMPLICATIONS: ICD-10-CM

## 2024-07-31 DIAGNOSIS — K50.813 CROHN'S DISEASE OF BOTH SMALL AND LARGE INTESTINE WITH FISTULA: ICD-10-CM

## 2024-07-31 LAB
BASOPHILS # BLD AUTO: 0.04 K/UL — SIGNIFICANT CHANGE UP (ref 0–0.2)
BASOPHILS NFR BLD AUTO: 0.8 % — SIGNIFICANT CHANGE UP (ref 0–2)
EOSINOPHIL # BLD AUTO: 0.08 K/UL — SIGNIFICANT CHANGE UP (ref 0–0.5)
EOSINOPHIL NFR BLD AUTO: 1.5 % — SIGNIFICANT CHANGE UP (ref 0–6)
HCT VFR BLD CALC: 32 % — LOW (ref 34.5–45)
HGB BLD-MCNC: 9.7 G/DL — LOW (ref 11.5–15.5)
IMM GRANULOCYTES NFR BLD AUTO: 0.2 % — SIGNIFICANT CHANGE UP (ref 0–0.9)
LYMPHOCYTES # BLD AUTO: 0.51 K/UL — LOW (ref 1–3.3)
LYMPHOCYTES # BLD AUTO: 9.7 % — LOW (ref 13–44)
MCHC RBC-ENTMCNC: 23.5 PG — LOW (ref 27–34)
MCHC RBC-ENTMCNC: 30.3 G/DL — LOW (ref 32–36)
MCV RBC AUTO: 77.7 FL — LOW (ref 80–100)
MONOCYTES # BLD AUTO: 0.48 K/UL — SIGNIFICANT CHANGE UP (ref 0–0.9)
MONOCYTES NFR BLD AUTO: 9.1 % — SIGNIFICANT CHANGE UP (ref 2–14)
NEUTROPHILS # BLD AUTO: 4.16 K/UL — SIGNIFICANT CHANGE UP (ref 1.8–7.4)
NEUTROPHILS NFR BLD AUTO: 78.7 % — HIGH (ref 43–77)
NRBC # BLD: 0 /100 WBCS — SIGNIFICANT CHANGE UP (ref 0–0)
NRBC BLD-RTO: 0 /100 WBCS — SIGNIFICANT CHANGE UP (ref 0–0)
PLATELET # BLD AUTO: 207 K/UL — SIGNIFICANT CHANGE UP (ref 150–400)
RBC # BLD: 4.12 M/UL — SIGNIFICANT CHANGE UP (ref 3.8–5.2)
RBC # FLD: 16.7 % — HIGH (ref 10.3–14.5)
WBC # BLD: 5.28 K/UL — SIGNIFICANT CHANGE UP (ref 3.8–10.5)
WBC # FLD AUTO: 5.28 K/UL — SIGNIFICANT CHANGE UP (ref 3.8–10.5)

## 2024-07-31 PROCEDURE — G2211 COMPLEX E/M VISIT ADD ON: CPT | Mod: NC

## 2024-07-31 PROCEDURE — 99244 OFF/OP CNSLTJ NEW/EST MOD 40: CPT

## 2024-07-31 RX ORDER — CHOLECALCIFEROL (VITAMIN D3) 50 MCG
5000 TABLET ORAL
Qty: 30 | Refills: 0 | Status: ACTIVE | COMMUNITY
Start: 2024-07-31 | End: 1900-01-01

## 2024-07-31 NOTE — HISTORY OF PRESENT ILLNESS
[Date: ____________] : Patient's last distress assessment performed on [unfilled]. [0 - No Distress] : Distress Level: 0 [90: Able to carry normal activity; minor signs or symptoms of disease.] : 90: Able to carry normal activity; minor signs or symptoms of disease.  [ECOG Performance Status: 1 - Restricted in physically strenuous activity but ambulatory and able to carry out work of a light or sedentary nature] : Performance Status: 1 - Restricted in physically strenuous activity but ambulatory and able to carry out work of a light or sedentary nature, e.g., light house work, office work [de-identified] : Ms Hernandez is a 58-year-old female with a history of need for an ileostomy reversal. She is referred for evaluation of anemia. The patient was managing her food intake in the setting of pain and distension  In 2023 she had stricture on MR and met Dr Betts. She was treated with Rinvoq by Dr Hathaway December 2024 (she had shingles vaccination). retreatment in February. Another MR March 2024 worsening. 29 May 2024 Bowel resection performed by Dr Betts in treatment of Crohn's enterocolitis. She has an ileostomy. Food appears in colostomy within one hour of ingestions. She is currently on home TPN; currently 10 hours until 6 AM; planning weaning now three days per week. no fever no chills or readmission to hospital.  Planning to reverse ileostomy 21 August 2024 surgeon will be Sawyer Morales [FreeTextEntry1] : discussion of IV iron infusion 200 mg and parenteral B 12 X 2 prior to surgery. [de-identified] : HPI

## 2024-07-31 NOTE — ASSESSMENT
[FreeTextEntry1] : Candace Hernandez is a 58-year-old female with a prior history of Crohn's disease diagnosed after birth of first child 2004. She has he had waxing and waning of symptoms with one prior bowel resection 2004. First resection may have been one foot, and second resection may have also been a foot although she is not sure. She has a early (high) ileostomy requiring bag emptying every 2-3 hours of awake time. She is currently eating tree meals daily with snacks and has a nephologist monitoring her electrolytes weekly at this time (8 weeks into ileostomy). I provided printed educational material on the use and side effects of medication iron sucrose and vitamin 12 therapy; this will be supplemental medication to improve the anemia in setting of gastrointestinal malabsorption. The patient has signed consent for treatment.  Planned reversal 21 August 2024. Patient is cleared for reversal of ileostomy provided normal coagulation studies and completion of two doses of iron and vitamin B 12 therapy. RTC in one month.  [Supportive] : Goals of care discussed with patient: Supportive [Palliative Care Plan] : not applicable at this time

## 2024-07-31 NOTE — REVIEW OF SYSTEMS
[Recent Change In Weight] : ~T recent weight change [Diarrhea: Grade 0] : Diarrhea: Grade 0 [Joint Pain] : joint pain [Negative] : Allergic/Immunologic [Dysphagia] : no dysphagia [Loss of Hearing] : no loss of hearing [Nosebleeds] : no nosebleeds [Hoarseness] : no hoarseness [Odynophagia] : no odynophagia [Mucosal Pain] : no mucosal pain [Joint Stiffness] : no joint stiffness [Muscle Pain] : no muscle pain [FreeTextEntry2] : gained weight from 95 to 104 lbs [FreeTextEntry7] : no nausea or vomiting [FreeTextEntry9] : knuckles

## 2024-08-01 LAB
FERRITIN SERPL-MCNC: 10 NG/ML
FOLATE SERPL-MCNC: >20 NG/ML
IRON SATN MFR SERPL: 3 %
IRON SERPL-MCNC: 17 UG/DL
TIBC SERPL-MCNC: 557 UG/DL
UIBC SERPL-MCNC: 541 UG/DL
VIT B12 SERPL-MCNC: >2000 PG/ML

## 2024-08-06 ENCOUNTER — NON-APPOINTMENT (OUTPATIENT)
Age: 59
End: 2024-08-06

## 2024-08-06 ENCOUNTER — APPOINTMENT (OUTPATIENT)
Dept: INFUSION THERAPY | Facility: HOSPITAL | Age: 59
End: 2024-08-06

## 2024-08-07 DIAGNOSIS — D50.9 IRON DEFICIENCY ANEMIA, UNSPECIFIED: ICD-10-CM

## 2024-08-12 ENCOUNTER — APPOINTMENT (OUTPATIENT)
Dept: RHEUMATOLOGY | Facility: CLINIC | Age: 59
End: 2024-08-12
Payer: COMMERCIAL

## 2024-08-12 VITALS
RESPIRATION RATE: 18 BRPM | DIASTOLIC BLOOD PRESSURE: 80 MMHG | TEMPERATURE: 97.6 F | HEART RATE: 73 BPM | SYSTOLIC BLOOD PRESSURE: 128 MMHG | OXYGEN SATURATION: 98 %

## 2024-08-12 PROCEDURE — 96365 THER/PROPH/DIAG IV INF INIT: CPT

## 2024-08-12 RX ORDER — RISANKIZUMAB-RZAA 60 MG/ML
600 INJECTION INTRAVENOUS
Qty: 0 | Refills: 0 | Status: COMPLETED
Start: 2024-07-10

## 2024-08-12 NOTE — HISTORY OF PRESENT ILLNESS
[Denies] : Denies [No] : No [Yes] : Yes [Declined] : Declined [TB] : Tuberculosis screening [Hep acute panel] : Hepatitis acute panel [Left upper extremity] : Left upper extremity [24g] : 24g [Start Time: ___] : Medication Start Time: [unfilled] [End Time: ___] : Medication End Time: [unfilled] [Medication Name: ___] : Medication Name: [unfilled] [Total Amount Administered: ___] : Total Amount Administered: [unfilled] [IV discontinued. Intact. No signs or symptoms of IV complications noted. Time: ___] : IV discontinued. Intact. No signs or symptoms of IV complications noted. Time: [unfilled] [Patient  instructed to seek medical attention with signs and symptoms of adverse effects] : Patient  instructed to seek medical attention with signs and symptoms of adverse effects [Patient left unit in no acute distress] : Patient left unit in no acute distress [Medications administered as ordered and tolerated well.] : Medications administered as ordered and tolerated well. [de-identified] : Left Hand [de-identified] : 09:35 am

## 2024-08-14 ENCOUNTER — APPOINTMENT (OUTPATIENT)
Dept: INFUSION THERAPY | Facility: HOSPITAL | Age: 59
End: 2024-08-14

## 2024-08-20 ENCOUNTER — OUTPATIENT (OUTPATIENT)
Dept: OUTPATIENT SERVICES | Facility: HOSPITAL | Age: 59
LOS: 1 days | End: 2024-08-20
Payer: COMMERCIAL

## 2024-08-20 ENCOUNTER — TRANSCRIPTION ENCOUNTER (OUTPATIENT)
Age: 59
End: 2024-08-20

## 2024-08-20 ENCOUNTER — APPOINTMENT (OUTPATIENT)
Dept: COLORECTAL SURGERY | Facility: CLINIC | Age: 59
End: 2024-08-20
Payer: COMMERCIAL

## 2024-08-20 VITALS
WEIGHT: 100 LBS | DIASTOLIC BLOOD PRESSURE: 78 MMHG | BODY MASS INDEX: 19.63 KG/M2 | RESPIRATION RATE: 16 BRPM | HEART RATE: 73 BPM | TEMPERATURE: 97.4 F | OXYGEN SATURATION: 100 % | SYSTOLIC BLOOD PRESSURE: 132 MMHG | HEIGHT: 60 IN

## 2024-08-20 VITALS
HEART RATE: 66 BPM | TEMPERATURE: 98 F | RESPIRATION RATE: 16 BRPM | WEIGHT: 98.99 LBS | DIASTOLIC BLOOD PRESSURE: 79 MMHG | HEIGHT: 60 IN | SYSTOLIC BLOOD PRESSURE: 124 MMHG | OXYGEN SATURATION: 99 %

## 2024-08-20 DIAGNOSIS — Z29.9 ENCOUNTER FOR PROPHYLACTIC MEASURES, UNSPECIFIED: ICD-10-CM

## 2024-08-20 DIAGNOSIS — Z43.2 ENCOUNTER FOR ATTENTION TO ILEOSTOMY: ICD-10-CM

## 2024-08-20 DIAGNOSIS — Z90.711 ACQUIRED ABSENCE OF UTERUS WITH REMAINING CERVICAL STUMP: Chronic | ICD-10-CM

## 2024-08-20 DIAGNOSIS — Z90.49 ACQUIRED ABSENCE OF OTHER SPECIFIED PARTS OF DIGESTIVE TRACT: Chronic | ICD-10-CM

## 2024-08-20 DIAGNOSIS — Z01.818 ENCOUNTER FOR OTHER PREPROCEDURAL EXAMINATION: ICD-10-CM

## 2024-08-20 DIAGNOSIS — Z79.2 LONG TERM (CURRENT) USE OF ANTIBIOTICS: ICD-10-CM

## 2024-08-20 DIAGNOSIS — K50.813 CROHN'S DISEASE OF BOTH SMALL AND LARGE INTESTINE WITH FISTULA: ICD-10-CM

## 2024-08-20 DIAGNOSIS — Z98.890 OTHER SPECIFIED POSTPROCEDURAL STATES: Chronic | ICD-10-CM

## 2024-08-20 LAB
ANION GAP SERPL CALC-SCNC: 11 MMOL/L — SIGNIFICANT CHANGE UP (ref 5–17)
BLD GP AB SCN SERPL QL: NEGATIVE — SIGNIFICANT CHANGE UP
BUN SERPL-MCNC: 18 MG/DL — SIGNIFICANT CHANGE UP (ref 7–23)
CALCIUM SERPL-MCNC: 11.2 MG/DL — HIGH (ref 8.4–10.5)
CHLORIDE SERPL-SCNC: 101 MMOL/L — SIGNIFICANT CHANGE UP (ref 96–108)
CO2 SERPL-SCNC: 26 MMOL/L — SIGNIFICANT CHANGE UP (ref 22–31)
CREAT SERPL-MCNC: 0.75 MG/DL — SIGNIFICANT CHANGE UP (ref 0.5–1.3)
EGFR: 93 ML/MIN/1.73M2 — SIGNIFICANT CHANGE UP
GLUCOSE SERPL-MCNC: 87 MG/DL — SIGNIFICANT CHANGE UP (ref 70–99)
HCT VFR BLD CALC: 39 % — SIGNIFICANT CHANGE UP (ref 34.5–45)
HGB BLD-MCNC: 11.6 G/DL — SIGNIFICANT CHANGE UP (ref 11.5–15.5)
MCHC RBC-ENTMCNC: 23 PG — LOW (ref 27–34)
MCHC RBC-ENTMCNC: 29.7 GM/DL — LOW (ref 32–36)
MCV RBC AUTO: 77.2 FL — LOW (ref 80–100)
NRBC # BLD: 0 /100 WBCS — SIGNIFICANT CHANGE UP (ref 0–0)
PLATELET # BLD AUTO: 295 K/UL — SIGNIFICANT CHANGE UP (ref 150–400)
POTASSIUM SERPL-MCNC: 4.1 MMOL/L — SIGNIFICANT CHANGE UP (ref 3.5–5.3)
POTASSIUM SERPL-SCNC: 4.1 MMOL/L — SIGNIFICANT CHANGE UP (ref 3.5–5.3)
RBC # BLD: 5.05 M/UL — SIGNIFICANT CHANGE UP (ref 3.8–5.2)
RBC # FLD: 20 % — HIGH (ref 10.3–14.5)
RH IG SCN BLD-IMP: POSITIVE — SIGNIFICANT CHANGE UP
SODIUM SERPL-SCNC: 138 MMOL/L — SIGNIFICANT CHANGE UP (ref 135–145)
WBC # BLD: 5.23 K/UL — SIGNIFICANT CHANGE UP (ref 3.8–10.5)
WBC # FLD AUTO: 5.23 K/UL — SIGNIFICANT CHANGE UP (ref 3.8–10.5)

## 2024-08-20 PROCEDURE — 99212 OFFICE O/P EST SF 10 MIN: CPT

## 2024-08-20 PROCEDURE — 86901 BLOOD TYPING SEROLOGIC RH(D): CPT

## 2024-08-20 PROCEDURE — 80048 BASIC METABOLIC PNL TOTAL CA: CPT

## 2024-08-20 PROCEDURE — 85027 COMPLETE CBC AUTOMATED: CPT

## 2024-08-20 PROCEDURE — G0463: CPT

## 2024-08-20 PROCEDURE — 86900 BLOOD TYPING SEROLOGIC ABO: CPT

## 2024-08-20 PROCEDURE — 86850 RBC ANTIBODY SCREEN: CPT

## 2024-08-20 RX ORDER — CHLORHEXIDINE GLUCONATE 40 MG/ML
1 SOLUTION TOPICAL ONCE
Refills: 0 | Status: DISCONTINUED | OUTPATIENT
Start: 2024-08-22 | End: 2024-08-25

## 2024-08-20 NOTE — PHYSICAL EXAM
[No Rash or Lesion] : No rash or lesion [Alert] : alert [Oriented to Person] : oriented to person [Oriented to Place] : oriented to place [Oriented to Time] : oriented to time [Calm] : calm [de-identified] : +stoma [de-identified] : WDWn female, NAD [de-identified] : [place] [de-identified] : No C/C/E

## 2024-08-20 NOTE — H&P PST ADULT - ATTENDING COMMENTS
Patient seen and examined. No change from H&P. All risks and benefits explained to the patient and the patient consents to the procedure.    Sawyer Morales

## 2024-08-20 NOTE — H&P PST ADULT - PRIMARY CARE PROVIDER
(PCP) Brian Pro (Cardiologist) Dr. Boykin (PCP) Brian Pro [phone]  (Cardiologist) Remigio Boykin [phone]

## 2024-08-20 NOTE — H&P PST ADULT - NSICDXPASTMEDICALHX_GEN_ALL_CORE_FT
PAST MEDICAL HISTORY:  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     History of Crohn's disease     PICC (peripherally inserted central catheter) in place     Prolapse of female pelvic organs

## 2024-08-20 NOTE — H&P PST ADULT - I HAVE PERSONALLY SEEN AND EXAMINED THE PATIENT. THERE HAVE NOT BEEN ANY CHANGES IN THE PATIENT'S HISTORY OR EXAM UNLESS COMMENTED BELOW
Vascular Surgery Consult Note  -------------------------------------------------------------------------------------------------  Date: 7/4/2020    Consulting Physician: Eligio Culp M.D. [facility]  -------------------------------------------------------------------------------------------------    Reason for consultation:  Left leg pain    HPI:  This is a 72 y.o. male who is presenting with 3-day history of left leg pain.  He has a history of a left femoral endarterectomy and a left femoral to above-knee popliteal artery bypass performed in January 2020.  Duplex is confirmed thrombosis of the bypass graft and weak reconstitution of the anterior tibial artery.  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      Past medical history:  -Peripheral arterial occlusive disease  -Diabetes  -Tobacco use  -Hypertension  -Dyslipidemia  -Chronic kidney disease    Past Surgical History:   Procedure Laterality Date   • ANGIOGRAM  1/1/2020    Procedure: ANGIOGRAM;  Surgeon: Gage Valles M.D.;  Location: [facility];  Service: General   • FEMORAL TIBIAL BYPASS Left 1/1/2020    Procedure: CREATION, BYPASS, ARTERIAL, FEMORAL TO TIBIAL;  Surgeon: Gage Valles M.D.;  Location: [facility];  Service: General   • FEMORAL ENDARTERECTOMY Left 12/27/2019    Procedure: ENDARTERECTOMY, FEMORAL;  Surgeon: Gage Valles M.D.;  Location: [facility];  Service: General   • ANGIOGRAM Left 12/27/2019    Procedure: ANGIOGRAM;  Surgeon: Gage Valles M.D.;  Location: [facility];  Service: General       Current Facility-Administered Medications   Medication Dose Route Frequency Provider Last Rate Last Dose   • morphine (pf) 4 MG/ML injection 4 mg  4 mg Intravenous Q HOUR PRN Wilberto Negrete M.D.   4 mg at 07/04/20 2035   • heparin injection 2,600 Units  2,600 Units Intravenous PRN Wilberto Negrete M.D.        And   • heparin infusion 25,000 units in 500  mL 0.45% NACL   Intravenous Continuous Wilberto Negrete M.D. 21 mL/hr at 07/04/20 2122 1,050 Units/hr at 07/04/20 2122   • LR infusion (bolus)  1,000 mL Intravenous Once Wilberto Negrete M.D.         Current Outpatient Medications   Medication Sig Dispense Refill   • acetaminophen (TYLENOL) 325 MG Tab Take 2 Tabs by mouth every 6 hours as needed (Mild Pain; (Pain scale 1-3); Temp greater than 100.5 F). 30 Tab 0   • ondansetron (ZOFRAN ODT) 4 MG TABLET DISPERSIBLE Take 1 Tab by mouth every four hours as needed for Nausea (give PO if IV route is unavailable.). 10 Tab 0   • senna-docusate (PERICOLACE OR SENOKOT S) 8.6-50 MG Tab Take 2 Tabs by mouth 2 Times a Day. 30 Tab 0   • polyethylene glycol/lytes (MIRALAX) Pack Take 1 Packet by mouth 1 time daily as needed (if sennosides and docusate ineffective after 24 hours).  3   • bisacodyl (DULCOLAX) 10 MG Suppos Insert 1 Suppository in rectum 1 time daily as needed (if magnesium hydroxide ineffective after 24 hours).  0   • tamsulosin (FLOMAX) 0.4 MG capsule Take 1 Cap by mouth ONE-HALF HOUR AFTER BREAKFAST. 30 Cap    • magnesium hydroxide (MILK OF MAGNESIA) 400 MG/5ML Suspension Take 30 mL by mouth 1 time daily as needed (if polyethylene glycol ineffective after 24 hours). 1 Bottle    • gabapentin (NEURONTIN) 300 MG Cap Take 300 mg by mouth 3 times a day.     • hydroCHLOROthiazide (HYDRODIURIL) 25 MG Tab Take 25 mg by mouth every day.     • lisinopril (PRINIVIL) 40 MG tablet Take 40 mg by mouth every day.     • metoprolol SR (TOPROL XL) 50 MG TABLET SR 24 HR Take 50 mg by mouth every day.     • MELATONIN PO Take 2 Tabs by mouth every bedtime.         Social History     Socioeconomic History   • Marital status:      Spouse name: Not on file   • Number of children: Not on file   • Years of education: Not on file   • Highest education level: Not on file   Occupational History   • Not on file   Social Needs   • Financial resource strain: Not on file   • Food insecurity  "    Worry: Not on file     Inability: Not on file   • Transportation needs     Medical: Not on file     Non-medical: Not on file   Tobacco Use   • Smoking status: Former Smoker     Packs/day: 0.00   • Smokeless tobacco: Never Used   Substance and Sexual Activity   • Alcohol use: Never     Frequency: Never   • Drug use: Never   • Sexual activity: Not on file   Lifestyle   • Physical activity     Days per week: Not on file     Minutes per session: Not on file   • Stress: Not on file   Relationships   • Social connections     Talks on phone: Not on file     Gets together: Not on file     Attends Congregation service: Not on file     Active member of club or organization: Not on file     Attends meetings of clubs or organizations: Not on file     Relationship status: Not on file   • Intimate partner violence     Fear of current or ex partner: Not on file     Emotionally abused: Not on file     Physically abused: Not on file     Forced sexual activity: Not on file   Other Topics Concern   • Not on file   Social History Narrative   • Not on file       History reviewed. No pertinent family history.    Allergies:  Patient has no known allergies.    Review of Systems:  Noncontributory except as per HPI      Physical Exam:  /91   Pulse 69   Temp 37.2 °C (99 °F) (Temporal)   Resp 16   Ht 1.727 m (5' 8\")   Wt 65.8 kg (145 lb)   SpO2 94%     Constitutional: Alert, oriented, mild distress from left leg pain  HEENT:  Normocephalic and atraumatic, EOMI  Neck:   Supple, no JVD,   Cardiovascular: Regular rate and rhythm,   Pulmonary:  Good air entry bilaterally,    Abdominal:  Soft, non-tender, non-distended     Aortic impulse not widened  Neurological:  CN II-XII grossly intact, no focal deficits     Patient has motor function of the left foot and toes but some decrease sensation of the left forefoot  Skin:   Skin is warm and dry, except for the left lower leg and foot which is somewhat cool to the " touch  Vascular:  4-second capillary refill left foot    Labs:  Recent Labs     07/04/20 2014   WBC 7.7   RBC 3.19*   HEMOGLOBIN 9.9*   HEMATOCRIT 29.4*   MCV 92.2   MCH 31.0   MCHC 33.7   RDW 51.8*   PLATELETCT 188   MPV 10.7     Recent Labs     07/04/20 2014   SODIUM 125*   POTASSIUM 5.5   CHLORIDE 97   CO2 14*   GLUCOSE 72   BUN 52*   CREATININE 2.43*   CALCIUM 8.5     Recent Labs     07/04/20 2014   APTT 35.5   INR 1.03     Recent Labs     07/04/20 2014   ASTSGOT 28   ALTSGPT 23   TBILIRUBIN 0.3   ALKPHOSPHAT 123*   GLOBULIN 2.8   INR 1.03       Radiology:  Duplex is confirmed thrombosis of the bypass graft and weak reconstitution of the anterior tibial artery.    Assessment/Plan:  -Peripheral arterial occlusive disease with left femoropopliteal bypass graft thrombosis and ischemic rest pain of the left leg  -Diabetes  -Tobacco use  -Hypertension  -Dyslipidemia  -Chronic kidney disease    Patient presents with thrombosis of the left femoropopliteal bypass graft and poor distal tibial flow.  Endovascular revascularization might be possible with a thrombolytic procedure.  Patient will be maintained on a heparin drip and pain medication and then tomorrow morning we will perform an angiogram with possible thrombolysis.    Appreciate hospitalist service's support managing Mr. Walker    Eligio Culp MD  [facility] (General and Vascular Surgery)  Cell: [phone] (text or call is fine, if you don't reach me please try my office)  Office: [phone]    7/4/2020    10:15 PM  ___________________________________________________________________  Patient:Antonio Walker   MRN:[id number]   CSN:[id number]     Statement Selected

## 2024-08-20 NOTE — ASSESSMENT
[FreeTextEntry1] : 58 y.o female with crohns disease and hx of resection   She has subacute bowel obstruction with distention. I strongly do believe as stated and advised she should have surgery within 1 month, she would like to work until the end of the year. Will need bowel resection with possible temporary ileostomy (20%)  I am concerned about her and I am ready for the surgery whenever she is ready. I have provided her my cell phone number. She is going to need a resection of the previous anastomosis and sigmoid resection and lysis of additions and assessing the area of the rest of the small bowel. All risk benefits morbidity were explained she consents. 5-7 days in the hospital 4-6 weeks recovery.  5/23/2024 - She is prepared for surgery. We discussed the risk, benefits and alternatives and she consents.Disussed bowel prep - clear liquid diet. She will go to PST. She met with Shilpa and we discussed that she may need a temporary ileostomy.  6/25/2024 -  Follow up for GGE/MRI of the Pelvis in 2-3 weeks Will start Wilma with Dr. Rosario (will need 2 infusions) Then will discuss timing of ileostomy closure To follow up with Dr. Carolina for TPN weaning given managable output continue lomotil and imodium  8/20/2024 For ileostomy closure discussed perioperative period Will follow up with our dietician Debby Will follow up with GI Risk, Benefit, Plan and alternatives discussed and she consents.

## 2024-08-20 NOTE — H&P PST ADULT - WEIGHT IN KG
Clinic Care Coordination Contact    Situation: Patient chart reviewed by care coordinator.    Background:    1/12/2024 - 1/13/2024 (1 hours)  [facility] Emergency Dept     Blas Betts MD  Last attending  Treatment team Heart failure with reduced ejection fraction, NYHA class II (H) +2 more  Clinical impression Hypotension  Chief complaint     Assessment: PCP hospital follow up today     Plan/Recommendations: CC RN will follow up in 1-2 business days     [employer]   Ruthy Paiz RN, Care Coordinator   [facility]'s   E-mail [email or web address]   [phone]     44.9

## 2024-08-20 NOTE — H&P PST ADULT - HISTORY OF PRESENT ILLNESS
59 y/o F with PMHx significant for Crohn's Dx (diagnosed 2004) s/p Small Bowel Resection (2004) 2/2 Small Bowel Obstruction, Pt had been continuing GI follow up since, she had imaging performed in 04/2024 which revealed small bowel inflammation and stricturing, Pt s/p Small Bowel Resection, Stricturoplasty and Laparoscopic Illeostomy Creation on 05/29/2024. She reports she was receiving TPN via Right arm PICC line after hospital discharge, TPN ceased at the end of July '24. She is now scheduled for Ileostomy closure on 08/21/2024.

## 2024-08-20 NOTE — HISTORY OF PRESENT ILLNESS
[FreeTextEntry1] : Referred by Dr. 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 Betts) on lap SBR (2004)for a SBO She subsequently had intermittent symptoms and was not on systemic therapy for a long period of time Trialed Remicade 4 years ago with lip swelling so stopped Currently on Rinvoq  5/23/2024 Here for a pre-op visit, discussed procedure. Last Rinvoq 1 week ago. She is still able to eat. No change in bowel habits. No nausea/vomiting.  6/25/2024 Seen today for a post-op visit. She is 4 weeks post op (5/29) She has an ileostomy. Feeling well, getting better. Has TPN. Not on any pain medication.   8/20/2024 Here for a pre-op visit today. She has been doing well. She is off of TPN, still has her PICC line in place. She has gotten to infusions of Skyrizi (last dose 8/14) and she is scheduled to get her next one on 9/5. Feeling well, has lost a couple of pounds since stopping TPN. No nausea/no vomiting. She has a good appetite and energy.

## 2024-08-20 NOTE — H&P PST ADULT - PROBLEM SELECTOR PLAN 1
Scheduled for Ileostomy Closure  Pre-procedure labs collected. Surgical soap given to patient. PST instructions provided. Patient verbalized understanding of instructions. Scheduled for Ileostomy Closure  Pre-procedure labs collected. Surgical soap given to patient. PST instructions provided. Patient verbalized understanding of instructions.    Modified ERP instructions provided.

## 2024-08-20 NOTE — H&P PST ADULT - ASSESSMENT
DASI score:  DASI activity:  Loose teeth or denture:   MP:    CAPRINI SCORE    AGE RELATED RISK FACTORS                                                             [ ] Age 41-60 years                                            (1 Point)  [ ] Age: 61-74 years                                           (2 Points)                 [ ] Age= 75 years                                                (3 Points)             DISEASE RELATED RISK FACTORS                                                       [ ] Edema in the lower extremities                 (1 Point)                     [ ] Varicose veins                                               (1 Point)                                 [ ] BMI > 25 Kg/m2                                            (1 Point)                                  [ ] Serious infection (ie PNA)                            (1 Point)                     [ ] Lung disease ( COPD, Emphysema)            (1 Point)                                                                          [ ] Acute myocardial infarction                         (1 Point)                  [ ] Congestive heart failure (in the previous month)  (1 Point)         [ ] Inflammatory bowel disease                            (1 Point)                  [ ] Central venous access, PICC or Port               (2 points)       (within the last month)                                                                [ ] Stroke (in the previous month)                        (5 Points)    [ ] Previous or present malignancy                       (2 points)                                                                                                                                                         HEMATOLOGY RELATED FACTORS                                                         [ ] Prior episodes of VTE                                     (3 Points)                     [ ] Positive family history for VTE                      (3 Points)                  [ ] Prothrombin 93907 A                                     (3 Points)                     [ ] Factor V Leiden                                                (3 Points)                        [ ] Lupus anticoagulants                                      (3 Points)                                                           [ ] Anticardiolipin antibodies                              (3 Points)                                                       [ ] High homocysteine in the blood                   (3 Points)                                             [ ] Other congenital or acquired thrombophilia      (3 Points)                                                [ ] Heparin induced thrombocytopenia                  (3 Points)                                        MOBILITY RELATED FACTORS  [ ] Bed rest                                                         (1 Point)  [ ] Plaster cast                                                    (2 points)  [ ] Bed bound for more than 72 hours           (2 Points)    GENDER SPECIFIC FACTORS  [ ] Pregnancy or had a baby within the last month   (1 Point)  [ ] Post-partum < 6 weeks                                   (1 Point)  [ ] Hormonal therapy  or oral contraception   (1 Point)  [ ] History of pregnancy complications              (1 point)  [ ] Unexplained or recurrent              (1 Point)    OTHER RISK FACTORS                                           (1 Point)  [ ] BMI >40, smoking, diabetes requiring insulin, chemotherapy  blood transfusions and length of surgery over 2 hours    SURGERY RELATED RISK FACTORS  [ ]  Section within the last month     (1 Point)  [ ] Minor surgery                                                  (1 Point)  [ ] Arthroscopic surgery                                       (2 Points)  [ ] Planned major surgery lasting more            (2 Points)      than 45 minutes     [ ] Elective hip or knee joint replacement       (5 points)       surgery                                                TRAUMA RELATED RISK FACTORS  [ ] Fracture of the hip, pelvis, or leg                       (5 Points)  [ ] Spinal cord injury resulting in paralysis             (5 points)       (in the previous month)    [ ] Paralysis  (less than 1 month)                             (5 Points)  [ ] Multiple Trauma within 1 month                        (5 Points)    Total Score [        ]    Caprini Score 0-2: Low Risk, NO VTE prophylaxis required for most patients, encourage ambulation  Caprini Score 3-6: Moderate Risk , pharmacologic VTE prophylaxis is indicated for most patients (in the absence of contraindications)  Caprini Score Greater than or =7: High risk, pharmocologic VTE prophylaxis indicated for most patients (in the absence of contraindication)           DASI score: 7.8 METS  DASI activity: walking, moderate housework  Loose teeth or denture: Denies  MP: Class 1    CAPRINI SCORE    AGE RELATED RISK FACTORS                                                             [X ] Age 41-60 years                                            (1 Point)  [ ] Age: 61-74 years                                           (2 Points)                 [ ] Age= 75 years                                                (3 Points)             DISEASE RELATED RISK FACTORS                                                       [ ] Edema in the lower extremities                 (1 Point)                     [ ] Varicose veins                                               (1 Point)                                 [ ] BMI > 25 Kg/m2                                            (1 Point)                                  [ ] Serious infection (ie PNA)                            (1 Point)                     [ ] Lung disease ( COPD, Emphysema)            (1 Point)                                                                          [ ] Acute myocardial infarction                         (1 Point)                  [ ] Congestive heart failure (in the previous month)  (1 Point)         [X ] Inflammatory bowel disease                            (1 Point)                  [ ] Central venous access, PICC or Port               (2 points)       (within the last month)                                                                [ ] Stroke (in the previous month)                        (5 Points)    [ ] Previous or present malignancy                       (2 points)                                                                                                                                                         HEMATOLOGY RELATED FACTORS                                                         [ ] Prior episodes of VTE                                     (3 Points)                     [ ] Positive family history for VTE                      (3 Points)                  [ ] Prothrombin 68430 A                                     (3 Points)                     [ ] Factor V Leiden                                                (3 Points)                        [ ] Lupus anticoagulants                                      (3 Points)                                                           [ ] Anticardiolipin antibodies                              (3 Points)                                                       [ ] High homocysteine in the blood                   (3 Points)                                             [ ] Other congenital or acquired thrombophilia      (3 Points)                                                [ ] Heparin induced thrombocytopenia                  (3 Points)                                        MOBILITY RELATED FACTORS  [ X] Bed rest                                                         (1 Point)  [ ] Plaster cast                                                    (2 points)  [ ] Bed bound for more than 72 hours           (2 Points)    GENDER SPECIFIC FACTORS  [ ] Pregnancy or had a baby within the last month   (1 Point)  [ ] Post-partum < 6 weeks                                   (1 Point)  [ ] Hormonal therapy  or oral contraception   (1 Point)  [ ] History of pregnancy complications              (1 point)  [ ] Unexplained or recurrent              (1 Point)    OTHER RISK FACTORS                                           (1 Point)  [ ] BMI >40, smoking, diabetes requiring insulin, chemotherapy  blood transfusions and length of surgery over 2 hours    SURGERY RELATED RISK FACTORS  [ ]  Section within the last month     (1 Point)  [ ] Minor surgery                                                  (1 Point)  [ ] Arthroscopic surgery                                       (2 Points)  [ X] Planned major surgery lasting more            (2 Points)      than 45 minutes     [ ] Elective hip or knee joint replacement       (5 points)       surgery                                                TRAUMA RELATED RISK FACTORS  [ ] Fracture of the hip, pelvis, or leg                       (5 Points)  [ ] Spinal cord injury resulting in paralysis             (5 points)       (in the previous month)    [ ] Paralysis  (less than 1 month)                             (5 Points)  [ ] Multiple Trauma within 1 month                        (5 Points)    Total Score [   5     ]    Caprini Score 0-2: Low Risk, NO VTE prophylaxis required for most patients, encourage ambulation  Caprini Score 3-6: Moderate Risk , pharmacologic VTE prophylaxis is indicated for most patients (in the absence of contraindications)  Caprini Score Greater than or =7: High risk, pharmocologic VTE prophylaxis indicated for most patients (in the absence of contraindication)

## 2024-08-21 ENCOUNTER — APPOINTMENT (OUTPATIENT)
Dept: COLORECTAL SURGERY | Facility: HOSPITAL | Age: 59
End: 2024-08-21
Payer: COMMERCIAL

## 2024-08-21 ENCOUNTER — INPATIENT (INPATIENT)
Facility: HOSPITAL | Age: 59
LOS: 3 days | Discharge: HOME | DRG: 395 | End: 2024-08-25
Attending: COLON & RECTAL SURGERY | Admitting: COLON & RECTAL SURGERY
Payer: COMMERCIAL

## 2024-08-21 VITALS
RESPIRATION RATE: 17 BRPM | HEIGHT: 60 IN | HEART RATE: 91 BPM | TEMPERATURE: 97 F | WEIGHT: 98.99 LBS | DIASTOLIC BLOOD PRESSURE: 85 MMHG | OXYGEN SATURATION: 100 % | SYSTOLIC BLOOD PRESSURE: 125 MMHG

## 2024-08-21 DIAGNOSIS — Z98.890 OTHER SPECIFIED POSTPROCEDURAL STATES: Chronic | ICD-10-CM

## 2024-08-21 DIAGNOSIS — Z90.711 ACQUIRED ABSENCE OF UTERUS WITH REMAINING CERVICAL STUMP: Chronic | ICD-10-CM

## 2024-08-21 DIAGNOSIS — Z43.2 ENCOUNTER FOR ATTENTION TO ILEOSTOMY: ICD-10-CM

## 2024-08-21 DIAGNOSIS — Z90.49 ACQUIRED ABSENCE OF OTHER SPECIFIED PARTS OF DIGESTIVE TRACT: Chronic | ICD-10-CM

## 2024-08-21 PROCEDURE — 35840 EXPLORE ABDOMINAL VESSELS: CPT | Mod: 78

## 2024-08-21 PROCEDURE — 44620 REPAIR BOWEL OPENING: CPT | Mod: 58

## 2024-08-21 RX ORDER — ALENDRONATE SODIUM 5 MG/1
1 TABLET ORAL
Refills: 0 | DISCHARGE

## 2024-08-21 RX ORDER — KETOROLAC TROMETHAMINE 30 MG/ML
15 INJECTION, SOLUTION INTRAMUSCULAR EVERY 6 HOURS
Refills: 0 | Status: DISCONTINUED | OUTPATIENT
Start: 2024-08-21 | End: 2024-08-22

## 2024-08-21 RX ORDER — FOLIC ACID/MULTIVIT,IRON,MINER 0.4MG-18MG
1 TABLET,CHEWABLE ORAL
Refills: 0 | DISCHARGE

## 2024-08-21 RX ORDER — CEFOTETAN 2 G/1
2 INJECTION, POWDER, FOR SOLUTION INTRAMUSCULAR; INTRAVENOUS ONCE
Refills: 0 | Status: COMPLETED | OUTPATIENT
Start: 2024-08-21 | End: 2024-08-21

## 2024-08-21 RX ORDER — ACETAMINOPHEN 325 MG/1
650 TABLET ORAL EVERY 6 HOURS
Refills: 0 | Status: DISCONTINUED | OUTPATIENT
Start: 2024-08-21 | End: 2024-08-22

## 2024-08-21 RX ORDER — HYDROMORPHONE HYDROCHLORIDE 2 MG/1
0.2 TABLET ORAL EVERY 4 HOURS
Refills: 0 | Status: DISCONTINUED | OUTPATIENT
Start: 2024-08-21 | End: 2024-08-22

## 2024-08-21 RX ORDER — ONDANSETRON 2 MG/ML
4 INJECTION, SOLUTION INTRAMUSCULAR; INTRAVENOUS ONCE
Refills: 0 | Status: DISCONTINUED | OUTPATIENT
Start: 2024-08-21 | End: 2024-08-21

## 2024-08-21 RX ORDER — HEPARIN SODIUM,BOVINE 1000/ML
5000 VIAL (ML) INJECTION EVERY 8 HOURS
Refills: 0 | Status: DISCONTINUED | OUTPATIENT
Start: 2024-08-21 | End: 2024-08-22

## 2024-08-21 RX ORDER — RISANKIZUMAB-RZAA 360 MG/2.4
0 KIT SUBCUTANEOUS
Refills: 0 | DISCHARGE

## 2024-08-21 RX ORDER — SODIUM CHLORIDE 9 MG/ML
3 INJECTION INTRAMUSCULAR; INTRAVENOUS; SUBCUTANEOUS EVERY 8 HOURS
Refills: 0 | Status: DISCONTINUED | OUTPATIENT
Start: 2024-08-21 | End: 2024-08-21

## 2024-08-21 RX ORDER — FENTANYL CITRATE 50 UG/ML
25 INJECTION INTRAMUSCULAR; INTRAVENOUS
Refills: 0 | Status: DISCONTINUED | OUTPATIENT
Start: 2024-08-21 | End: 2024-08-21

## 2024-08-21 RX ORDER — LIDOCAINE HCL 20 MG/ML
0.2 VIAL (ML) INJECTION ONCE
Refills: 0 | Status: DISCONTINUED | OUTPATIENT
Start: 2024-08-21 | End: 2024-08-21

## 2024-08-21 RX ADMIN — KETOROLAC TROMETHAMINE 15 MILLIGRAM(S): 30 INJECTION, SOLUTION INTRAMUSCULAR at 23:00

## 2024-08-21 RX ADMIN — FENTANYL CITRATE 25 MICROGRAM(S): 50 INJECTION INTRAMUSCULAR; INTRAVENOUS at 16:50

## 2024-08-21 RX ADMIN — KETOROLAC TROMETHAMINE 15 MILLIGRAM(S): 30 INJECTION, SOLUTION INTRAMUSCULAR at 17:46

## 2024-08-21 RX ADMIN — Medication 100 MILLILITER(S): at 16:55

## 2024-08-21 RX ADMIN — ACETAMINOPHEN 260 MILLIGRAM(S): 325 TABLET ORAL at 19:30

## 2024-08-21 RX ADMIN — FENTANYL CITRATE 25 MICROGRAM(S): 50 INJECTION INTRAMUSCULAR; INTRAVENOUS at 16:45

## 2024-08-21 RX ADMIN — FENTANYL CITRATE 25 MICROGRAM(S): 50 INJECTION INTRAMUSCULAR; INTRAVENOUS at 16:34

## 2024-08-21 RX ADMIN — Medication 5000 UNIT(S): at 22:55

## 2024-08-21 RX ADMIN — ACETAMINOPHEN 650 MILLIGRAM(S): 325 TABLET ORAL at 19:45

## 2024-08-21 RX ADMIN — FENTANYL CITRATE 25 MICROGRAM(S): 50 INJECTION INTRAMUSCULAR; INTRAVENOUS at 17:00

## 2024-08-21 RX ADMIN — KETOROLAC TROMETHAMINE 15 MILLIGRAM(S): 30 INJECTION, SOLUTION INTRAMUSCULAR at 18:00

## 2024-08-21 NOTE — PATIENT PROFILE ADULT - NSPROPTRIGHTREPNAME_GEN_A__NUR
Jakub Hernandez  PRINCIPAL DISCHARGE DIAGNOSIS  Diagnosis: Rib fractures  Assessment and Plan of Treatment: Follow up: please call and make an appointment with your primary care physician as per your usual schedule.   Activity: May return to normal activities as tolerated, however refrain from heavy lifting > 10-15 lbs.  Diet: May continue regular diet.  Medications: Please take all medications listed on your discharge paperwork as prescribed. Pain medication has been prescribed for you. Please, take it as it has been prescribed, do not drive or operate heavy machinery while taking narcotics.  You are encouraged to take over-the-counter tylenol and/or ibuprofen for pain relief when you feel your pain no longer warrants the use of narcotic pain medications, however DO NOT TAKE percocet and tylenol at the same time as they contain the same active ingredient (acetaminophen). Take only percocet OR tylenol.  Wound Care: Please, keep wound site clean and dry. You may shower, but do not bathe.  Patient is advised to RETURN TO THE EMERGENCY DEPARTMENT for any of the following - worsening pain, fever/chills, nausea/vomiting, altered mental status, chest pain, shortness of breath, or any other new / worsening symptom.

## 2024-08-21 NOTE — CHART NOTE - NSCHARTNOTEFT_GEN_A_CORE
General Surgery Post op Check    Pt seen and examined without complaints. Pain is controlled. Denies SOB/CP/N/V.     Vital Signs Last 24 Hrs  T(C): 36.6 (21 Aug 2024 19:30), Max: 36.6 (21 Aug 2024 19:30)  T(F): 97.9 (21 Aug 2024 19:30), Max: 97.9 (21 Aug 2024 19:30)  HR: 78 (21 Aug 2024 20:00) (59 - 98)  BP: 117/60 (21 Aug 2024 20:00) (110/55 - 156/79)  BP(mean): 83 (21 Aug 2024 20:00) (79 - 114)  RR: 16 (21 Aug 2024 20:00) (14 - 18)  SpO2: 99% (21 Aug 2024 20:00) (96% - 100%)    Parameters below as of 21 Aug 2024 20:00  Patient On (Oxygen Delivery Method): room air        I&O's Summary    21 Aug 2024 07:01  -  21 Aug 2024 20:10  --------------------------------------------------------  IN: 400 mL / OUT: 700 mL / NET: -300 mL        Physical Exam  Gen: NAD, A&Ox3  Pulm: No respiratory distress, no subcostal retractions  CV: RRR, no JVD  Abd: Soft, appropriately tender, ND, Incision on right covered with SS saturated dressing, Left incision covered with dry dressing.         Assessment: 58y Female s/p ileostomy reversal recovering well in PACU.     Plan:  -DVT prophylaxis w/ SCD & SQH.  Encouraged OOB  -Strict I&O's  -Analgesia and antiemetics as needed, TAP, tylenol and toradol   -Diet: NPO  -Monitor overnight

## 2024-08-22 LAB
ANION GAP SERPL CALC-SCNC: 7 MMOL/L — SIGNIFICANT CHANGE UP (ref 5–17)
APTT BLD: 32.2 SEC — SIGNIFICANT CHANGE UP (ref 24.5–35.6)
BUN SERPL-MCNC: 11 MG/DL — SIGNIFICANT CHANGE UP (ref 7–23)
CALCIUM SERPL-MCNC: 9.2 MG/DL — SIGNIFICANT CHANGE UP (ref 8.4–10.5)
CHLORIDE SERPL-SCNC: 104 MMOL/L — SIGNIFICANT CHANGE UP (ref 96–108)
CO2 SERPL-SCNC: 26 MMOL/L — SIGNIFICANT CHANGE UP (ref 22–31)
CREAT SERPL-MCNC: 0.84 MG/DL — SIGNIFICANT CHANGE UP (ref 0.5–1.3)
EGFR: 82 ML/MIN/1.73M2 — SIGNIFICANT CHANGE UP
GLUCOSE SERPL-MCNC: 101 MG/DL — HIGH (ref 70–99)
HCT VFR BLD CALC: 31.8 % — LOW (ref 34.5–45)
HGB BLD-MCNC: 9.9 G/DL — LOW (ref 11.5–15.5)
INR BLD: 1.2 RATIO — HIGH (ref 0.85–1.18)
MAGNESIUM SERPL-MCNC: 2.1 MG/DL — SIGNIFICANT CHANGE UP (ref 1.6–2.6)
MCHC RBC-ENTMCNC: 24 PG — LOW (ref 27–34)
MCHC RBC-ENTMCNC: 31.1 GM/DL — LOW (ref 32–36)
MCV RBC AUTO: 77 FL — LOW (ref 80–100)
NRBC # BLD: 0 /100 WBCS — SIGNIFICANT CHANGE UP (ref 0–0)
PHOSPHATE SERPL-MCNC: 1.9 MG/DL — LOW (ref 2.5–4.5)
PLATELET # BLD AUTO: 229 K/UL — SIGNIFICANT CHANGE UP (ref 150–400)
POTASSIUM SERPL-MCNC: 4.7 MMOL/L — SIGNIFICANT CHANGE UP (ref 3.5–5.3)
POTASSIUM SERPL-SCNC: 4.7 MMOL/L — SIGNIFICANT CHANGE UP (ref 3.5–5.3)
PROTHROM AB SERPL-ACNC: 12.5 SEC — SIGNIFICANT CHANGE UP (ref 9.5–13)
RBC # BLD: 4.13 M/UL — SIGNIFICANT CHANGE UP (ref 3.8–5.2)
RBC # FLD: 20.7 % — HIGH (ref 10.3–14.5)
SODIUM SERPL-SCNC: 137 MMOL/L — SIGNIFICANT CHANGE UP (ref 135–145)
WBC # BLD: 9.29 K/UL — SIGNIFICANT CHANGE UP (ref 3.8–10.5)
WBC # FLD AUTO: 9.29 K/UL — SIGNIFICANT CHANGE UP (ref 3.8–10.5)

## 2024-08-22 PROCEDURE — 35840 EXPLORE ABDOMINAL VESSELS: CPT | Mod: 78

## 2024-08-22 DEVICE — SURGIFOAM PAD 8CM X 12.5CM X 10MM (100): Type: IMPLANTABLE DEVICE | Status: FUNCTIONAL

## 2024-08-22 RX ORDER — HYDROMORPHONE HYDROCHLORIDE 2 MG/1
0.25 TABLET ORAL
Refills: 0 | Status: DISCONTINUED | OUTPATIENT
Start: 2024-08-22 | End: 2024-08-22

## 2024-08-22 RX ORDER — ACETAMINOPHEN 325 MG/1
650 TABLET ORAL EVERY 6 HOURS
Refills: 0 | Status: COMPLETED | OUTPATIENT
Start: 2024-08-22 | End: 2024-08-23

## 2024-08-22 RX ORDER — HYDROMORPHONE HYDROCHLORIDE 2 MG/1
0.5 TABLET ORAL
Refills: 0 | Status: DISCONTINUED | OUTPATIENT
Start: 2024-08-22 | End: 2024-08-22

## 2024-08-22 RX ORDER — KETOROLAC TROMETHAMINE 30 MG/ML
15 INJECTION, SOLUTION INTRAMUSCULAR EVERY 6 HOURS
Refills: 0 | Status: DISCONTINUED | OUTPATIENT
Start: 2024-08-22 | End: 2024-08-24

## 2024-08-22 RX ORDER — ONDANSETRON 2 MG/ML
4 INJECTION, SOLUTION INTRAMUSCULAR; INTRAVENOUS ONCE
Refills: 0 | Status: DISCONTINUED | OUTPATIENT
Start: 2024-08-22 | End: 2024-08-22

## 2024-08-22 RX ORDER — SODIUM PHOSPHATE, MONOBASIC, MONOHYDRATE AND SODIUM PHOSPHATE, DIBASIC ANHYDROUS 276; 142 MG/ML; MG/ML
30 INJECTION, SOLUTION INTRAVENOUS ONCE
Refills: 0 | Status: DISCONTINUED | OUTPATIENT
Start: 2024-08-22 | End: 2024-08-25

## 2024-08-22 RX ORDER — HYDROMORPHONE HYDROCHLORIDE 2 MG/1
0.2 TABLET ORAL EVERY 4 HOURS
Refills: 0 | Status: DISCONTINUED | OUTPATIENT
Start: 2024-08-22 | End: 2024-08-25

## 2024-08-22 RX ORDER — HEPARIN SODIUM,BOVINE 1000/ML
5000 VIAL (ML) INJECTION EVERY 8 HOURS
Refills: 0 | Status: DISCONTINUED | OUTPATIENT
Start: 2024-08-22 | End: 2024-08-23

## 2024-08-22 RX ADMIN — ACETAMINOPHEN 650 MILLIGRAM(S): 325 TABLET ORAL at 02:55

## 2024-08-22 RX ADMIN — ACETAMINOPHEN 260 MILLIGRAM(S): 325 TABLET ORAL at 17:31

## 2024-08-22 RX ADMIN — Medication 100 MILLILITER(S): at 02:12

## 2024-08-22 RX ADMIN — KETOROLAC TROMETHAMINE 15 MILLIGRAM(S): 30 INJECTION, SOLUTION INTRAMUSCULAR at 06:35

## 2024-08-22 RX ADMIN — Medication 100 MILLILITER(S): at 12:09

## 2024-08-22 RX ADMIN — KETOROLAC TROMETHAMINE 15 MILLIGRAM(S): 30 INJECTION, SOLUTION INTRAMUSCULAR at 00:00

## 2024-08-22 RX ADMIN — KETOROLAC TROMETHAMINE 15 MILLIGRAM(S): 30 INJECTION, SOLUTION INTRAMUSCULAR at 21:18

## 2024-08-22 RX ADMIN — Medication 5000 UNIT(S): at 06:20

## 2024-08-22 RX ADMIN — KETOROLAC TROMETHAMINE 15 MILLIGRAM(S): 30 INJECTION, SOLUTION INTRAMUSCULAR at 12:09

## 2024-08-22 RX ADMIN — KETOROLAC TROMETHAMINE 15 MILLIGRAM(S): 30 INJECTION, SOLUTION INTRAMUSCULAR at 12:24

## 2024-08-22 RX ADMIN — KETOROLAC TROMETHAMINE 15 MILLIGRAM(S): 30 INJECTION, SOLUTION INTRAMUSCULAR at 20:48

## 2024-08-22 RX ADMIN — ACETAMINOPHEN 260 MILLIGRAM(S): 325 TABLET ORAL at 02:13

## 2024-08-22 RX ADMIN — KETOROLAC TROMETHAMINE 15 MILLIGRAM(S): 30 INJECTION, SOLUTION INTRAMUSCULAR at 06:20

## 2024-08-22 NOTE — BRIEF OPERATIVE NOTE - NSICDXBRIEFPOSTOP_GEN_ALL_CORE_FT
POST-OP DIAGNOSIS:  Bleeding from wound 22-Aug-2024 11:21:28  Samuel Giraldo  
POST-OP DIAGNOSIS:  Encounter for attention to ileostomy 21-Aug-2024 15:49:31  Jennifer Costa

## 2024-08-22 NOTE — PRE-ANESTHESIA EVALUATION ADULT - NSANTHOSAYNRD_GEN_A_CORE
No. ZAKI screening performed.  STOP BANG Legend: 0-2 = LOW Risk; 3-4 = INTERMEDIATE Risk; 5-8 = HIGH Risk
No. ZAKI screening performed.  STOP BANG Legend: 0-2 = LOW Risk; 3-4 = INTERMEDIATE Risk; 5-8 = HIGH Risk

## 2024-08-22 NOTE — PRE-ANESTHESIA EVALUATION ADULT - HEART RATE (BEATS/MIN)
Attempted to call patient, no answer, left message to call back to Shiv Perry, DO office.    [employer] message also sent to patient. Form already on provider's desk.    EC[employer] Representative: Please reach out to [facility] [id number] [facility] via [employer] Secure Chat to see if a team member is available to take patient's call.    If team member is unavailable, please document in this encounter that patient returned call and route to: [facility] NON TRIAGE POOL     
[phone]
[phone]

## 2024-08-22 NOTE — PRE-ANESTHESIA EVALUATION ADULT - NSANTHPMHFT_GEN_ALL_CORE
Medical History discussed with patient. All concerns were addressed.
no sob or chest pain on exertion

## 2024-08-22 NOTE — PRE-ANESTHESIA EVALUATION ADULT - NSANTHAIRWAYFT_ENT_ALL_CORE
3 finger mouth opening, 6cm greater thyromental distance, normal neck mobility
neck FROM, Mallampati evaluated

## 2024-08-22 NOTE — BRIEF OPERATIVE NOTE - NSICDXBRIEFPROCEDURE_GEN_ALL_CORE_FT
PROCEDURES:  Complex reversal of ileostomy 21-Aug-2024 15:49:19  Jennifer Costa  
PROCEDURES:  Exploration, wound, torso 22-Aug-2024 11:20:31  Samuel Giraldo  Evacuation, hematoma, torso 22-Aug-2024 11:20:49  Samuel Giraldo

## 2024-08-22 NOTE — CHART NOTE - NSCHARTNOTEFT_GEN_A_CORE
STATUS POST:      POST OPERATIVE DAY #:     SUBJECTIVE: Pt seen  SOB:  [ ] YES [ ] NO  Chest Discomfort: [ ] YES [ ] NO    Nausea: [ ] YES [ ] NO           Vomiting: [ ] YES [ ] NO  Flatus: [ ] YES [ ] NO             Bowel Movement: [ ] YES [ ] NO  Diarrhea: [ ] YES [ ] NO         Void: [ ]YES [ ]No  Constipation: [ ] YES [ ] NO     Pain (0-10):              Pain Control Adequate: [ ] YES [ ] NO  Spivey:  NGT:    Vital Signs Last 24 Hrs  T(C): 37.2 (22 Aug 2024 13:55), Max: 37.2 (22 Aug 2024 13:55)  T(F): 98.9 (22 Aug 2024 13:55), Max: 98.9 (22 Aug 2024 13:55)  HR: 77 (22 Aug 2024 13:55) (59 - 98)  BP: 117/65 (22 Aug 2024 13:55) (105/58 - 156/79)  BP(mean): 75 (22 Aug 2024 13:00) (75 - 114)  RR: 18 (22 Aug 2024 13:55) (14 - 18)  SpO2: 98% (22 Aug 2024 13:55) (93% - 100%)    Parameters below as of 22 Aug 2024 13:55  Patient On (Oxygen Delivery Method): room air      I&O's Summary    21 Aug 2024 07:01  -  22 Aug 2024 07:00  --------------------------------------------------------  IN: 400 mL / OUT: 1200 mL / NET: -800 mL    22 Aug 2024 07:01  -  22 Aug 2024 15:43  --------------------------------------------------------  IN: 0 mL / OUT: 300 mL / NET: -300 mL      I&O's Detail    21 Aug 2024 07:01  -  22 Aug 2024 07:00  --------------------------------------------------------  IN:    Lactated Ringers: 400 mL  Total IN: 400 mL    OUT:    Oral Fluid: 0 mL    Voided (mL): 1200 mL  Total OUT: 1200 mL    Total NET: -800 mL      22 Aug 2024 07:01  -  22 Aug 2024 15:43  --------------------------------------------------------  IN:  Total IN: 0 mL    OUT:    Oral Fluid: 0 mL    Voided (mL): 300 mL  Total OUT: 300 mL    Total NET: -300 mL          MEDICATIONS  (STANDING):  acetaminophen   IVPB .. 650 milliGRAM(s) IV Intermittent every 6 hours  chlorhexidine 2% Cloths 1 Application(s) Topical once  heparin   Injectable 5000 Unit(s) SubCutaneous every 8 hours  ketorolac   Injectable 15 milliGRAM(s) IV Push every 6 hours  lactated ringers. 1000 milliLiter(s) (100 mL/Hr) IV Continuous <Continuous>  sodium phosphate 30 milliMole(s)/500 mL IVPB 30 milliMole(s) IV Intermittent once    MEDICATIONS  (PRN):  HYDROmorphone  Injectable 0.2 milliGRAM(s) IV Push every 4 hours PRN Severe Pain (7 - 10)      LABS:                        9.9    9.29  )-----------( 229      ( 22 Aug 2024 12:21 )             31.8     08-22    137  |  104  |  11  ----------------------------<  101<H>  4.7   |  26  |  0.84    Ca    9.2      22 Aug 2024 12:21  Phos  1.9     08-22  Mg     2.1     08-22      PT/INR - ( 22 Aug 2024 12:21 )   PT: 12.5 sec;   INR: 1.20 ratio         PTT - ( 22 Aug 2024 12:21 )  PTT:32.2 sec  Urinalysis Basic - ( 22 Aug 2024 12:21 )    Color: x / Appearance: x / SG: x / pH: x  Gluc: 101 mg/dL / Ketone: x  / Bili: x / Urobili: x   Blood: x / Protein: x / Nitrite: x   Leuk Esterase: x / RBC: x / WBC x   Sq Epi: x / Non Sq Epi: x / Bacteria: x        RADIOLOGY & ADDITIONAL STUDIES:    PHYSICAL EXAM:      Constitutional:    Eyes:    ENMT:    Neck:    Breasts:    Back:    Respiratory:    Cardiovascular:    Gastrointestinal:    Genitourinary:    Rectal:    Extremities:    Vascular:    Neurological:    Skin:    Lymph Nodes:    Musculoskeletal:    Psychiatric:        A/P: 58y Female Attention to ileostomy    Handoff    MEWS Score    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    History of Crohn's disease    PICC (peripherally inserted central catheter) in place    Prolapse of female pelvic organs    Encounter for attention to ileostomy    Bleeding from wound    Encounter for attention to ileostomy    Bleeding from wound    Encounter for attention to ileostomy    Need for antibiotic prophylaxis for dental procedure    Need for prophylactic measure    Complex reversal of ileostomy    Exploration, wound, torso    Evacuation, hematoma, torso    Status post small bowel resection    History of colonoscopy    History of partial hysterectomy    History of bladder surgery    NVD (normal vaginal delivery)    ENCNTR FOR GENERAL ADULT MEDIC    [id number]     s/p   - Diet:   - Activity:  - Labs:   - Pain medication  - DVT ppx STATUS POST: wound exploration and evacuation of hematoma    POST OPERATIVE DAY #: 0    SUBJECTIVE: Pt seen at bedside, resting comfortably. Offers no complaints at this time. Denies any SOB, nausea/vomiting. Denies passing any gas.    Vital Signs Last 24 Hrs  T(C): 37.2 (22 Aug 2024 13:55), Max: 37.2 (22 Aug 2024 13:55)  T(F): 98.9 (22 Aug 2024 13:55), Max: 98.9 (22 Aug 2024 13:55)  HR: 77 (22 Aug 2024 13:55) (59 - 98)  BP: 117/65 (22 Aug 2024 13:55) (105/58 - 156/79)  BP(mean): 75 (22 Aug 2024 13:00) (75 - 114)  RR: 18 (22 Aug 2024 13:55) (14 - 18)  SpO2: 98% (22 Aug 2024 13:55) (93% - 100%)    Parameters below as of 22 Aug 2024 13:55  Patient On (Oxygen Delivery Method): room air      I&O's Summary    21 Aug 2024 07:01  -  22 Aug 2024 07:00  --------------------------------------------------------  IN: 400 mL / OUT: 1200 mL / NET: -800 mL    22 Aug 2024 07:01  -  22 Aug 2024 15:43  --------------------------------------------------------  IN: 0 mL / OUT: 300 mL / NET: -300 mL      I&O's Detail    21 Aug 2024 07:01  -  22 Aug 2024 07:00  --------------------------------------------------------  IN:    Lactated Ringers: 400 mL  Total IN: 400 mL    OUT:    Oral Fluid: 0 mL    Voided (mL): 1200 mL  Total OUT: 1200 mL    Total NET: -800 mL      22 Aug 2024 07:01  -  22 Aug 2024 15:43  --------------------------------------------------------  IN:  Total IN: 0 mL    OUT:    Oral Fluid: 0 mL    Voided (mL): 300 mL  Total OUT: 300 mL    Total NET: -300 mL          MEDICATIONS  (STANDING):  acetaminophen   IVPB .. 650 milliGRAM(s) IV Intermittent every 6 hours  chlorhexidine 2% Cloths 1 Application(s) Topical once  heparin   Injectable 5000 Unit(s) SubCutaneous every 8 hours  ketorolac   Injectable 15 milliGRAM(s) IV Push every 6 hours  lactated ringers. 1000 milliLiter(s) (100 mL/Hr) IV Continuous <Continuous>  sodium phosphate 30 milliMole(s)/500 mL IVPB 30 milliMole(s) IV Intermittent once    MEDICATIONS  (PRN):  HYDROmorphone  Injectable 0.2 milliGRAM(s) IV Push every 4 hours PRN Severe Pain (7 - 10)      LABS:                        9.9    9.29  )-----------( 229      ( 22 Aug 2024 12:21 )             31.8     08-22    137  |  104  |  11  ----------------------------<  101<H>  4.7   |  26  |  0.84    Ca    9.2      22 Aug 2024 12:21  Phos  1.9     08-22  Mg     2.1     08-22      PT/INR - ( 22 Aug 2024 12:21 )   PT: 12.5 sec;   INR: 1.20 ratio         PTT - ( 22 Aug 2024 12:21 )  PTT:32.2 sec  Urinalysis Basic - ( 22 Aug 2024 12:21 )    Color: x / Appearance: x / SG: x / pH: x  Gluc: 101 mg/dL / Ketone: x  / Bili: x / Urobili: x   Blood: x / Protein: x / Nitrite: x   Leuk Esterase: x / RBC: x / WBC x   Sq Epi: x / Non Sq Epi: x / Bacteria: x        PHYSICAL EXAM:  Constitutional: awake and alert, NAD  Eyes: EOMI  Head: atraumatic, normocephalic  Respiratory: non-labored breathing  Gastrointestinal: soft, tender to palpation over lateral aspect of previous ostomy site, covered with aquacell dressing  Genitourinary: voiding freely  Extremities: moves all extremities spontaneously      A/P: 58F now s/p wound exploration and evacuation of hematoma at previous ostomy site    - NPO/IVF  - Activity: OOB, ambulate as tolerated  - Labs: F/u AM labs  - Pain medication PRN  - DVT ppx - SQH

## 2024-08-22 NOTE — BRIEF OPERATIVE NOTE - NSICDXBRIEFPREOP_GEN_ALL_CORE_FT
PRE-OP DIAGNOSIS:  Bleeding from wound 22-Aug-2024 11:21:19  Samuel Giraldo  
PRE-OP DIAGNOSIS:  Encounter for attention to ileostomy 21-Aug-2024 15:49:23  Jennifer Costa

## 2024-08-22 NOTE — BRIEF OPERATIVE NOTE - OPERATION/FINDINGS
Ostomy closure site, oozing blood, no identifiable arterial bleeding.  Hematoma evacuated and fascia intact.  Hemostasis achieved with cautery.  Gelfoam packing with pressure dressing and abdominal binder. 
Loop ileostomy taken down and carefully dissected from surrounding soft tissue and fascia. Ileostomy closed using interrupted 3-0 vicryl sutures. Fascia closed using interrupted figure of eight PDS sutures. Skin left open after placement of subcutaneous 2-0 vicryl purse string suture.

## 2024-08-22 NOTE — PROVIDER CONTACT NOTE (MEDICATION) - ASSESSMENT
pt ordered for 10pm heparin shot. pt concerned taking shot d/t hematoma. RN reached out to andrzej asking if heparin shot is okay to be given. provider stated okay to give heparin. pt now refusing. pt ambulated in hallway multiple times. pt stated in the morning she will reevaluate

## 2024-08-22 NOTE — PRE-ANESTHESIA EVALUATION ADULT - NSANTHPEFT_GEN_ALL_CORE
RRR, resting comfortably on room air
Physical Exam:  GEN: NAD   LUNGS: CTA B/L.  HEART: RRR  NEUROLOGIC: No Focal Deficits

## 2024-08-22 NOTE — PRE-ANESTHESIA EVALUATION ADULT - ANESTHESIA, PREVIOUS REACTION, PROFILE
"very groggy" during ileostomy creation procedure/delayed awakening
"very groggy" during ileostomy creation procedure/delayed awakening

## 2024-08-22 NOTE — PROGRESS NOTE ADULT - ASSESSMENT
58F with PMHx of Crohn's Dx (diagnosed 2004) s/p Small Bowel Resection (2004) 2/2 Small Bowel Obstruction, Pt had been continuing GI follow up since, she had imaging performed in 04/2024 which revealed small bowel inflammation and stricturing, Pt s/p Small Bowel Resection, Stricturoplasty and Laparoscopic Illeostomy Creation on 05/29/2024. She reports she was receiving TPN via Right arm PICC line after hospital discharge, TPN ceased at the end of July '24. Pt now POD#1 s/p ileostomy reversal.  - NPO w/ IV hydration  - Awaiting return of bowel function  - DVT ppx - Hep SQ  - Analgesics PRN  - OOB and ambulate as tolerated  - F/u AM labs 58F with PMHx of Crohn's Dx (diagnosed 2004) s/p Small Bowel Resection (2004) 2/2 Small Bowel Obstruction, Pt had been continuing GI follow up since, she had imaging performed in 04/2024 which revealed small bowel inflammation and stricturing, Pt s/p Small Bowel Resection, Stricturoplasty and Laparoscopic Illeostomy Creation on 05/29/2024. She reports she was receiving TPN via Right arm PICC line after hospital discharge, TPN ceased at the end of July '24. Pt now POD#1 s/p ileostomy reversal c/b hematoma of wound.    - NPO w/ IV hydration  - Awaiting return of bowel function  - RTOR today for wound exploration  - DVT ppx - Hep SQ  - Analgesics PRN  - OOB and ambulate as tolerated  - F/u AM labs

## 2024-08-23 LAB
ANION GAP SERPL CALC-SCNC: 8 MMOL/L — SIGNIFICANT CHANGE UP (ref 5–17)
BUN SERPL-MCNC: 9 MG/DL — SIGNIFICANT CHANGE UP (ref 7–23)
CALCIUM SERPL-MCNC: 9.2 MG/DL — SIGNIFICANT CHANGE UP (ref 8.4–10.5)
CHLORIDE SERPL-SCNC: 105 MMOL/L — SIGNIFICANT CHANGE UP (ref 96–108)
CO2 SERPL-SCNC: 27 MMOL/L — SIGNIFICANT CHANGE UP (ref 22–31)
CREAT SERPL-MCNC: 0.83 MG/DL — SIGNIFICANT CHANGE UP (ref 0.5–1.3)
EGFR: 83 ML/MIN/1.73M2 — SIGNIFICANT CHANGE UP
GLUCOSE SERPL-MCNC: 103 MG/DL — HIGH (ref 70–99)
HCT VFR BLD CALC: 30.4 % — LOW (ref 34.5–45)
HGB BLD-MCNC: 8.9 G/DL — LOW (ref 11.5–15.5)
MAGNESIUM SERPL-MCNC: 2 MG/DL — SIGNIFICANT CHANGE UP (ref 1.6–2.6)
MCHC RBC-ENTMCNC: 22.8 PG — LOW (ref 27–34)
MCHC RBC-ENTMCNC: 29.3 GM/DL — LOW (ref 32–36)
MCV RBC AUTO: 77.7 FL — LOW (ref 80–100)
NRBC # BLD: 0 /100 WBCS — SIGNIFICANT CHANGE UP (ref 0–0)
PHOSPHATE SERPL-MCNC: 1.3 MG/DL — LOW (ref 2.5–4.5)
PLATELET # BLD AUTO: 267 K/UL — SIGNIFICANT CHANGE UP (ref 150–400)
POTASSIUM SERPL-MCNC: 3.6 MMOL/L — SIGNIFICANT CHANGE UP (ref 3.5–5.3)
POTASSIUM SERPL-SCNC: 3.6 MMOL/L — SIGNIFICANT CHANGE UP (ref 3.5–5.3)
RBC # BLD: 3.91 M/UL — SIGNIFICANT CHANGE UP (ref 3.8–5.2)
RBC # FLD: 21.1 % — HIGH (ref 10.3–14.5)
SODIUM SERPL-SCNC: 140 MMOL/L — SIGNIFICANT CHANGE UP (ref 135–145)
WBC # BLD: 5.22 K/UL — SIGNIFICANT CHANGE UP (ref 3.8–10.5)
WBC # FLD AUTO: 5.22 K/UL — SIGNIFICANT CHANGE UP (ref 3.8–10.5)

## 2024-08-23 RX ORDER — POTASSIUM CHLORIDE 10 MEQ
40 TABLET, EXT RELEASE, PARTICLES/CRYSTALS ORAL ONCE
Refills: 0 | Status: COMPLETED | OUTPATIENT
Start: 2024-08-23 | End: 2024-08-23

## 2024-08-23 RX ORDER — SODIUM PHOSPHATE, DIBASIC, ANHYDROUS, POTASSIUM PHOSPHATE, MONOBASIC, AND SODIUM PHOSPHATE, MONOBASIC, MONOHYDRATE 852; 155; 130 MG/1; MG/1; MG/1
2 TABLET, COATED ORAL ONCE
Refills: 0 | Status: COMPLETED | OUTPATIENT
Start: 2024-08-23 | End: 2024-08-23

## 2024-08-23 RX ORDER — SODIUM PHOSPHATE, MONOBASIC, MONOHYDRATE AND SODIUM PHOSPHATE, DIBASIC ANHYDROUS 276; 142 MG/ML; MG/ML
30 INJECTION, SOLUTION INTRAVENOUS ONCE
Refills: 0 | Status: COMPLETED | OUTPATIENT
Start: 2024-08-23 | End: 2024-08-23

## 2024-08-23 RX ADMIN — Medication 5000 UNIT(S): at 05:22

## 2024-08-23 RX ADMIN — KETOROLAC TROMETHAMINE 15 MILLIGRAM(S): 30 INJECTION, SOLUTION INTRAMUSCULAR at 12:25

## 2024-08-23 RX ADMIN — KETOROLAC TROMETHAMINE 15 MILLIGRAM(S): 30 INJECTION, SOLUTION INTRAMUSCULAR at 23:39

## 2024-08-23 RX ADMIN — ACETAMINOPHEN 650 MILLIGRAM(S): 325 TABLET ORAL at 05:51

## 2024-08-23 RX ADMIN — KETOROLAC TROMETHAMINE 15 MILLIGRAM(S): 30 INJECTION, SOLUTION INTRAMUSCULAR at 18:06

## 2024-08-23 RX ADMIN — KETOROLAC TROMETHAMINE 15 MILLIGRAM(S): 30 INJECTION, SOLUTION INTRAMUSCULAR at 02:36

## 2024-08-23 RX ADMIN — KETOROLAC TROMETHAMINE 15 MILLIGRAM(S): 30 INJECTION, SOLUTION INTRAMUSCULAR at 23:09

## 2024-08-23 RX ADMIN — ACETAMINOPHEN 260 MILLIGRAM(S): 325 TABLET ORAL at 00:32

## 2024-08-23 RX ADMIN — SODIUM PHOSPHATE, MONOBASIC, MONOHYDRATE AND SODIUM PHOSPHATE, DIBASIC ANHYDROUS 85 MILLIMOLE(S): 276; 142 INJECTION, SOLUTION INTRAVENOUS at 12:27

## 2024-08-23 RX ADMIN — ACETAMINOPHEN 650 MILLIGRAM(S): 325 TABLET ORAL at 01:02

## 2024-08-23 RX ADMIN — Medication 40 MILLIEQUIVALENT(S): at 12:26

## 2024-08-23 RX ADMIN — ACETAMINOPHEN 260 MILLIGRAM(S): 325 TABLET ORAL at 05:21

## 2024-08-23 RX ADMIN — SODIUM PHOSPHATE, DIBASIC, ANHYDROUS, POTASSIUM PHOSPHATE, MONOBASIC, AND SODIUM PHOSPHATE, MONOBASIC, MONOHYDRATE 2 PACKET(S): 852; 155; 130 TABLET, COATED ORAL at 12:26

## 2024-08-23 RX ADMIN — KETOROLAC TROMETHAMINE 15 MILLIGRAM(S): 30 INJECTION, SOLUTION INTRAMUSCULAR at 02:06

## 2024-08-23 NOTE — PROGRESS NOTE ADULT - ASSESSMENT
58F stricturing Crohns SBO then SBR in 2004 04/2024 - small bowel inflammation and stricturing, underwent SBR Stricturoplasty and Laparoscopic Illeostomy Creation on 05/29/2024. On TPN until July '24.  POD 8/21 - ileostomy reversal RTOR on 8/22 for wound exploration and hematoma evac    Plan:  - LRD  - Activity: OOB, ambulate as tolerated  - Labs: F/u AM labs  - Pain medication PRN  - DVT ppx: Ambulating well, chemical dvt ppx not needed at this time  - will remove dressing before dc

## 2024-08-24 PROBLEM — Z45.2 ENCOUNTER FOR ADJUSTMENT AND MANAGEMENT OF VASCULAR ACCESS DEVICE: Chronic | Status: ACTIVE | Noted: 2024-08-20

## 2024-08-24 LAB
ANION GAP SERPL CALC-SCNC: 9 MMOL/L — SIGNIFICANT CHANGE UP (ref 5–17)
BUN SERPL-MCNC: 6 MG/DL — LOW (ref 7–23)
CALCIUM SERPL-MCNC: 8.9 MG/DL — SIGNIFICANT CHANGE UP (ref 8.4–10.5)
CHLORIDE SERPL-SCNC: 106 MMOL/L — SIGNIFICANT CHANGE UP (ref 96–108)
CO2 SERPL-SCNC: 24 MMOL/L — SIGNIFICANT CHANGE UP (ref 22–31)
CREAT SERPL-MCNC: 0.66 MG/DL — SIGNIFICANT CHANGE UP (ref 0.5–1.3)
EGFR: 103 ML/MIN/1.73M2 — SIGNIFICANT CHANGE UP
GLUCOSE SERPL-MCNC: 100 MG/DL — HIGH (ref 70–99)
HCT VFR BLD CALC: 24.8 % — LOW (ref 34.5–45)
HCT VFR BLD CALC: 28.1 % — LOW (ref 34.5–45)
HGB BLD-MCNC: 7.4 G/DL — LOW (ref 11.5–15.5)
HGB BLD-MCNC: 8.3 G/DL — LOW (ref 11.5–15.5)
MAGNESIUM SERPL-MCNC: 1.7 MG/DL — SIGNIFICANT CHANGE UP (ref 1.6–2.6)
MCHC RBC-ENTMCNC: 23 PG — LOW (ref 27–34)
MCHC RBC-ENTMCNC: 23.4 PG — LOW (ref 27–34)
MCHC RBC-ENTMCNC: 29.5 GM/DL — LOW (ref 32–36)
MCHC RBC-ENTMCNC: 29.8 GM/DL — LOW (ref 32–36)
MCV RBC AUTO: 77.8 FL — LOW (ref 80–100)
MCV RBC AUTO: 78.5 FL — LOW (ref 80–100)
NRBC # BLD: 0 /100 WBCS — SIGNIFICANT CHANGE UP (ref 0–0)
NRBC # BLD: 0 /100 WBCS — SIGNIFICANT CHANGE UP (ref 0–0)
PHOSPHATE SERPL-MCNC: 1.6 MG/DL — LOW (ref 2.5–4.5)
PLATELET # BLD AUTO: 195 K/UL — SIGNIFICANT CHANGE UP (ref 150–400)
PLATELET # BLD AUTO: 235 K/UL — SIGNIFICANT CHANGE UP (ref 150–400)
POTASSIUM SERPL-MCNC: 4.1 MMOL/L — SIGNIFICANT CHANGE UP (ref 3.5–5.3)
POTASSIUM SERPL-SCNC: 4.1 MMOL/L — SIGNIFICANT CHANGE UP (ref 3.5–5.3)
RBC # BLD: 3.16 M/UL — LOW (ref 3.8–5.2)
RBC # BLD: 3.61 M/UL — LOW (ref 3.8–5.2)
RBC # FLD: 21.3 % — HIGH (ref 10.3–14.5)
RBC # FLD: 21.3 % — HIGH (ref 10.3–14.5)
SODIUM SERPL-SCNC: 139 MMOL/L — SIGNIFICANT CHANGE UP (ref 135–145)
WBC # BLD: 5.19 K/UL — SIGNIFICANT CHANGE UP (ref 3.8–10.5)
WBC # BLD: 5.82 K/UL — SIGNIFICANT CHANGE UP (ref 3.8–10.5)
WBC # FLD AUTO: 5.19 K/UL — SIGNIFICANT CHANGE UP (ref 3.8–10.5)
WBC # FLD AUTO: 5.82 K/UL — SIGNIFICANT CHANGE UP (ref 3.8–10.5)

## 2024-08-24 RX ORDER — POTASSIUM PHOSPHATE 236; 224 MG/ML; MG/ML
30 INJECTION, SOLUTION INTRAVENOUS ONCE
Refills: 0 | Status: COMPLETED | OUTPATIENT
Start: 2024-08-24 | End: 2024-08-24

## 2024-08-24 RX ADMIN — Medication 25 GRAM(S): at 13:32

## 2024-08-24 RX ADMIN — KETOROLAC TROMETHAMINE 15 MILLIGRAM(S): 30 INJECTION, SOLUTION INTRAMUSCULAR at 05:47

## 2024-08-24 RX ADMIN — POTASSIUM PHOSPHATE 83.33 MILLIMOLE(S): 236; 224 INJECTION, SOLUTION INTRAVENOUS at 18:47

## 2024-08-24 RX ADMIN — KETOROLAC TROMETHAMINE 15 MILLIGRAM(S): 30 INJECTION, SOLUTION INTRAMUSCULAR at 05:17

## 2024-08-24 NOTE — PROGRESS NOTE ADULT - ASSESSMENT
58F stricturing Crohns SBO then SBR in 2004 04/2024 - small bowel inflammation and stricturing, underwent SBR Stricturoplasty and Laparoscopic Illeostomy Creation on 05/29/2024. On TPN until July '24.  POD 8/21 - ileostomy reversal RTOR on 8/22 for wound exploration and hematoma evac    Plan:  - D/c tomorrow  - LRD  - Activity: OOB, ambulate as tolerated  - Labs: F/u AM labs, trend h/h   - Pain medication PRN  - DVT ppx: Ambulating well, chemical dvt ppx not needed at this time  - will remove dressing before dc    [facility]

## 2024-08-24 NOTE — PROGRESS NOTE ADULT - ATTENDING COMMENTS
Agree with E&M above
I saw and examined the patient. Agree with above findings. Plan is OR for wound exploration. She consents.    Tracey Vences MD 8/22/2024

## 2024-08-25 VITALS
HEART RATE: 88 BPM | DIASTOLIC BLOOD PRESSURE: 76 MMHG | TEMPERATURE: 98 F | RESPIRATION RATE: 18 BRPM | SYSTOLIC BLOOD PRESSURE: 117 MMHG | OXYGEN SATURATION: 100 %

## 2024-08-25 LAB
ANION GAP SERPL CALC-SCNC: 10 MMOL/L — SIGNIFICANT CHANGE UP (ref 5–17)
BUN SERPL-MCNC: 8 MG/DL — SIGNIFICANT CHANGE UP (ref 7–23)
CALCIUM SERPL-MCNC: 9.5 MG/DL — SIGNIFICANT CHANGE UP (ref 8.4–10.5)
CHLORIDE SERPL-SCNC: 107 MMOL/L — SIGNIFICANT CHANGE UP (ref 96–108)
CO2 SERPL-SCNC: 23 MMOL/L — SIGNIFICANT CHANGE UP (ref 22–31)
CREAT SERPL-MCNC: 0.61 MG/DL — SIGNIFICANT CHANGE UP (ref 0.5–1.3)
EGFR: 105 ML/MIN/1.73M2 — SIGNIFICANT CHANGE UP
GLUCOSE SERPL-MCNC: 103 MG/DL — HIGH (ref 70–99)
HCT VFR BLD CALC: 26.1 % — LOW (ref 34.5–45)
HGB BLD-MCNC: 7.8 G/DL — LOW (ref 11.5–15.5)
MAGNESIUM SERPL-MCNC: 2.3 MG/DL — SIGNIFICANT CHANGE UP (ref 1.6–2.6)
MCHC RBC-ENTMCNC: 23.6 PG — LOW (ref 27–34)
MCHC RBC-ENTMCNC: 29.9 GM/DL — LOW (ref 32–36)
MCV RBC AUTO: 79.1 FL — LOW (ref 80–100)
NRBC # BLD: 0 /100 WBCS — SIGNIFICANT CHANGE UP (ref 0–0)
PHOSPHATE SERPL-MCNC: 2.1 MG/DL — LOW (ref 2.5–4.5)
PLATELET # BLD AUTO: 233 K/UL — SIGNIFICANT CHANGE UP (ref 150–400)
POTASSIUM SERPL-MCNC: 4.3 MMOL/L — SIGNIFICANT CHANGE UP (ref 3.5–5.3)
POTASSIUM SERPL-SCNC: 4.3 MMOL/L — SIGNIFICANT CHANGE UP (ref 3.5–5.3)
RBC # BLD: 3.3 M/UL — LOW (ref 3.8–5.2)
RBC # FLD: 21.8 % — HIGH (ref 10.3–14.5)
SODIUM SERPL-SCNC: 140 MMOL/L — SIGNIFICANT CHANGE UP (ref 135–145)
WBC # BLD: 5.57 K/UL — SIGNIFICANT CHANGE UP (ref 3.8–10.5)
WBC # FLD AUTO: 5.57 K/UL — SIGNIFICANT CHANGE UP (ref 3.8–10.5)

## 2024-08-25 PROCEDURE — 36415 COLL VENOUS BLD VENIPUNCTURE: CPT

## 2024-08-25 PROCEDURE — C9399: CPT

## 2024-08-25 PROCEDURE — 80048 BASIC METABOLIC PNL TOTAL CA: CPT

## 2024-08-25 PROCEDURE — 83735 ASSAY OF MAGNESIUM: CPT

## 2024-08-25 PROCEDURE — 85027 COMPLETE CBC AUTOMATED: CPT

## 2024-08-25 PROCEDURE — 85610 PROTHROMBIN TIME: CPT

## 2024-08-25 PROCEDURE — 84100 ASSAY OF PHOSPHORUS: CPT

## 2024-08-25 PROCEDURE — 85730 THROMBOPLASTIN TIME PARTIAL: CPT

## 2024-08-25 PROCEDURE — C1889: CPT

## 2024-08-25 RX ORDER — OXYCODONE HYDROCHLORIDE 5 MG/1
1 TABLET ORAL
Qty: 5 | Refills: 0
Start: 2024-08-25 | End: 2024-08-25

## 2024-08-25 NOTE — DISCHARGE NOTE PROVIDER - NSDCFUSCHEDAPPT_GEN_ALL_CORE_FT
[facility]  [facility]  Scheduled Appointment: 08/27/2024    Michael Ngo  [facility]  [facility]  Scheduled Appointment: 08/27/2024    [facility]  [facility] [address]  Scheduled Appointment: 09/05/2024    [facility]  [address]  Scheduled Appointment: 09/10/2024

## 2024-08-25 NOTE — DISCHARGE NOTE NURSING/CASE MANAGEMENT/SOCIAL WORK - PATIENT PORTAL LINK FT
You can access the FollowMyHealth Patient Portal offered by [facility] by registering at the following website: [email or web address]. By joining [employer]’s FollowMyHealth portal, you will also be able to view your health information using other applications (apps) compatible with our system.

## 2024-08-25 NOTE — DISCHARGE NOTE PROVIDER - NSDCMRMEDTOKEN_GEN_ALL_CORE_FT
Fosamax 70 mg oral tablet: 1 tab(s) orally once a week  oxyCODONE 5 mg oral tablet: 1 tab(s) orally every 4 hours MDD: 5  Skyrizi 60 mg/mL intravenous solution: intravenously Infusion every 1 month  Vitamin D3 50 mcg (2000 intl units) oral tablet: 1 tab(s) orally once a day

## 2024-08-25 NOTE — DISCHARGE NOTE PROVIDER - NSDCCPTREATMENT_GEN_ALL_CORE_FT
Detail Level: Generalized
PRINCIPAL PROCEDURE  Procedure: Creation, revision, or reversal, loop colostomy or ileostomy  Findings and Treatment: WOUND CARE: gauze and tape to wound  BATHING: Please do not submerge wound underwater. You may shower and/or sponge bathe.  ACTIVITY: No heavy lifting anything more than 10-15lbs or straining. Otherwise, you may return to your usual level of physical activity. If you are taking narcotic pain medication (such as Percocet), do NOT drive a car, operate machinery or make important decisions.  DIET: Low fiber diet  NOTIFY YOUR SURGEON IF: You have any bleeding that does not stop, any pus draining from your wound, any fever (over 100.4 F) or chills, persistent nausea/vomiting with inability to tolerate food or liquids, persistent diarrhea, or if your pain is not controlled on your discharge pain medications.  FOLLOW-UP:  1. Please call to make a follow-up appointment within one week of discharge   2. Please follow up with your primary care physician in one week regarding your hospitalization.

## 2024-08-25 NOTE — PROGRESS NOTE ADULT - ASSESSMENT
58F stricturing Crohns SBO then SBR in 2004 04/2024 - small bowel inflammation and stricturing, underwent SBR Stricturoplasty and Laparoscopic Illeostomy Creation on 05/29/2024. On TPN until July '24.  POD 8/21 - ileostomy reversal RTOR on 8/22 for wound exploration and hematoma evac    Plan:  - D/c today  - LRD  - Activity: OOB, ambulate as tolerated  - Labs: F/u AM labs, trend h/h   - Pain medication PRN  - DVT ppx: Ambulating well, chemical dvt ppx not needed at this time  - will remove dressing before dc    [facility]

## 2024-08-25 NOTE — PROGRESS NOTE ADULT - SUBJECTIVE AND OBJECTIVE BOX
INTERVAL HPI/OVERNIGHT EVENTS:  Pt seen and examined at bedside resting comfortably. Pt now POD#1 s/p ileostomy reversal. Pt reports ostomy site was oozing overnight and RN reinforced dressing. Denies passing flatus.   Denies fever/chills, chest pain, dyspnea, cough, dizziness.     Vital Signs Last 24 Hrs  T(C): 36.8 (22 Aug 2024 04:20), Max: 36.9 (21 Aug 2024 20:20)  T(F): 98.3 (22 Aug 2024 04:20), Max: 98.4 (21 Aug 2024 20:20)  HR: 83 (22 Aug 2024 04:20) (59 - 98)  BP: 124/83 (22 Aug 2024 04:20) (110/55 - 156/79)  BP(mean): 83 (21 Aug 2024 20:00) (79 - 114)  RR: 18 (22 Aug 2024 04:20) (14 - 18)  SpO2: 99% (22 Aug 2024 04:20) (96% - 100%)    Parameters below as of 22 Aug 2024 04:20  Patient On (Oxygen Delivery Method): room air      PHYSICAL EXAM:  GENERAL: awake and alert, NAD  HEAD:  Atraumatic, Normocephalic  EYES: EOMI, PERRLA  CHEST/LUNG: non-labored breathing  ABDOMEN: soft, appropriately tender to palpation, ND, subQ hematoma noted under previous ostomy site, ostomy site dressing changed  EXTREMITIES: moves all extremities spontaneously    I&O's Detail    21 Aug 2024 07:01  -  22 Aug 2024 07:00  --------------------------------------------------------  IN:    Lactated Ringers: 400 mL  Total IN: 400 mL    OUT:    Oral Fluid: 0 mL    Voided (mL): 1200 mL  Total OUT: 1200 mL    Total NET: -800 mL          LABS:                        11.6   5.23  )-----------( 295      ( 20 Aug 2024 12:23 )             39.0     08-20    138  |  101  |  18  ----------------------------<  87  4.1   |  26  |  0.75    Ca    11.2<H>      20 Aug 2024 12:23        Urinalysis Basic - ( 20 Aug 2024 12:23 )    Color: x / Appearance: x / SG: x / pH: x  Gluc: 87 mg/dL / Ketone: x  / Bili: x / Urobili: x   Blood: x / Protein: x / Nitrite: x   Leuk Esterase: x / RBC: x / WBC x   Sq Epi: x / Non Sq Epi: x / Bacteria: x
Overnight Events:  No acute events overnight    SUBJECTIVE:  Reports pain is well controlled  Denies nausea, vomiting  Is passing gas and having bowel movements, denies diarrhea  Tolerating diet  Ambulating independently    OBJECTIVE:  Vital Signs Last 24 Hrs  T(C): 36.9 (24 Aug 2024 16:16), Max: 37.5 (24 Aug 2024 04:40)  T(F): 98.4 (24 Aug 2024 16:16), Max: 99.5 (24 Aug 2024 04:40)  HR: 88 (24 Aug 2024 16:16) (88 - 91)  BP: 127/79 (24 Aug 2024 16:16) (110/74 - 127/85)  BP(mean): --  RR: 18 (24 Aug 2024 16:16) (18 - 18)  SpO2: 99% (24 Aug 2024 16:16) (95% - 100%)    Parameters below as of 24 Aug 2024 16:16  Patient On (Oxygen Delivery Method): room air    I&O's Detail    23 Aug 2024 07:01  -  24 Aug 2024 07:00  --------------------------------------------------------  IN:    Lactated Ringers: 2400 mL    Oral Fluid: 960 mL  Total IN: 3360 mL    OUT:    Voided (mL): 1800 mL  Total OUT: 1800 mL    Total NET: 1560 mL      24 Aug 2024 07:01  -  24 Aug 2024 20:51  --------------------------------------------------------  IN:    IV PiggyBack: 550 mL    Lactated Ringers: 1200 mL    Oral Fluid: 350 mL  Total IN: 2100 mL    OUT:    Voided (mL): 1900 mL  Total OUT: 1900 mL    Total NET: 200 mL    Physical Examination:  Constitutional: awake and alert, NAD  Eyes: EOMI  Head: atraumatic, normocephalic  Respiratory: non-labored breathing  Gastrointestinal: soft, tender to palpation over lateral aspect of previous ostomy site, covered with aquacell dressing  Genitourinary: voiding freely  Extremities: moves all extremities spontaneously
POD #: POD 8/21 - ileostomy reversal RTOR on 8/22 for wound exploration and hematoma evac    24hr events:  RTOR for hematoma evac and hemnostasis    Overnight Events:  No acute events overnight    SUBJECTIVE:  Reports pain is well controlled  Denies nausea, vomiting  Is passing gas and having bowel movements, denies diarrhea  Tolerating diet  Ambulating independently    OBJECTIVE:  Vital Signs Last 24 Hrs  T(C): 36.9 (23 Aug 2024 09:26), Max: 37.4 (22 Aug 2024 17:51)  T(F): 98.4 (23 Aug 2024 09:26), Max: 99.4 (22 Aug 2024 17:51)  HR: 81 (23 Aug 2024 09:26) (75 - 86)  BP: 101/68 (23 Aug 2024 09:26) (101/68 - 131/63)  BP(mean): 75 (22 Aug 2024 13:00) (75 - 90)  RR: 18 (23 Aug 2024 09:26) (14 - 18)  SpO2: 100% (23 Aug 2024 09:26) (97% - 100%)    Parameters below as of 23 Aug 2024 09:26  Patient On (Oxygen Delivery Method): room air      Physical Examination:  Constitutional: awake and alert, NAD  Eyes: EOMI  Head: atraumatic, normocephalic  Respiratory: non-labored breathing  Gastrointestinal: soft, tender to palpation over lateral aspect of previous ostomy site, covered with aquacell dressing  Genitourinary: voiding freely  Extremities: moves all extremities spontaneously
SUBJECTIVE:  Patient seen and examined at bedside, doing well, ready for discharge.    OBJECTIVE:  Vital Signs Last 24 Hrs  T(C): 36.9 (25 Aug 2024 08:52), Max: 37.4 (24 Aug 2024 13:59)  T(F): 98.4 (25 Aug 2024 08:52), Max: 99.3 (24 Aug 2024 13:59)  HR: 88 (25 Aug 2024 08:52) (83 - 88)  BP: 117/76 (25 Aug 2024 08:52) (110/76 - 127/79)  BP(mean): --  RR: 18 (25 Aug 2024 08:52) (18 - 18)  SpO2: 100% (25 Aug 2024 08:52) (96% - 100%)    Parameters below as of 25 Aug 2024 08:52  Patient On (Oxygen Delivery Method): room air        Physical Examination:  GEN: NAD, resting quietly  NEURO: AAOx3, CN II-XII grossly intact, no focal deficits  PULM: symmetric chest rise bilaterally, no increased WOB  ABD: soft, nontender, nondistended, incision CDI  EXTR: no lower extremity edema, moving all extremities

## 2024-08-25 NOTE — DISCHARGE NOTE NURSING/CASE MANAGEMENT/SOCIAL WORK - NSDCPEFALRISK_GEN_ALL_CORE
For information on Fall & Injury Prevention, visit: [email or web address] OR  [email or web address] OR  https://www.cdc.gov/steadi/patient.html

## 2024-08-25 NOTE — DISCHARGE NOTE PROVIDER - CARE PROVIDER_API CALL
Tracey Vences  Colon/Rectal Surgery  [address] [place] [id number]  Phone: [phone]  Fax: [phone]  Follow Up Time:

## 2024-08-25 NOTE — DISCHARGE NOTE PROVIDER - CARE PROVIDERS DIRECT ADDRESSES
[email or web address]

## 2024-08-25 NOTE — DISCHARGE NOTE PROVIDER - HOSPITAL COURSE
57 y/o F with PMHx significant for Crohn's Dx (diagnosed 2004) s/p Small Bowel Resection (2004) 2/2 Small Bowel Obstruction, Pt had been continuing GI follow up since, she had imaging performed in 04/2024 which revealed small bowel inflammation and stricturing, Pt s/p Small Bowel Resection, Stricturoplasty and Laparoscopic Illeostomy Creation on 05/29/2024.     She underwent a ileostomy closure on 8/22 which was complicated by post operative hematoma and returned to the OR on 8/23 for a wound exploration, hematoma evacuation, and hemostasis.  She recovered well following her RTOR.  She was able to tolerated a low fiber diet, her pain was well controlled, and she was ambulating independently.  She resumed having bowel movements.  Her surgical site had appropriate serosanguinous drainage after the RTOR, no more evidence of acute bleeding.  On day of discharge patient was educated on dressing changes and wound care, she felt ready to be discharged.

## 2024-08-26 PROBLEM — N81.9 FEMALE GENITAL PROLAPSE, UNSPECIFIED: Chronic | Status: ACTIVE | Noted: 2024-08-20

## 2024-08-27 ENCOUNTER — RESULT REVIEW (OUTPATIENT)
Age: 59
End: 2024-08-27

## 2024-08-27 ENCOUNTER — APPOINTMENT (OUTPATIENT)
Dept: HEMATOLOGY ONCOLOGY | Facility: CLINIC | Age: 59
End: 2024-08-27
Payer: COMMERCIAL

## 2024-08-27 VITALS
TEMPERATURE: 98.2 F | DIASTOLIC BLOOD PRESSURE: 79 MMHG | WEIGHT: 102.27 LBS | RESPIRATION RATE: 16 BRPM | HEART RATE: 75 BPM | SYSTOLIC BLOOD PRESSURE: 128 MMHG | OXYGEN SATURATION: 99 % | BODY MASS INDEX: 19.98 KG/M2

## 2024-08-27 DIAGNOSIS — K50.90 CROHN'S DISEASE, UNSPECIFIED, W/OUT COMPLICATIONS: ICD-10-CM

## 2024-08-27 DIAGNOSIS — D50.8 OTHER IRON DEFICIENCY ANEMIAS: ICD-10-CM

## 2024-08-27 DIAGNOSIS — Z43.2 ENCOUNTER FOR ATTENTION TO ILEOSTOMY: ICD-10-CM

## 2024-08-27 LAB
BASOPHILS # BLD AUTO: 0.03 K/UL — SIGNIFICANT CHANGE UP (ref 0–0.2)
BASOPHILS NFR BLD AUTO: 0.5 % — SIGNIFICANT CHANGE UP (ref 0–2)
EOSINOPHIL # BLD AUTO: 0.17 K/UL — SIGNIFICANT CHANGE UP (ref 0–0.5)
EOSINOPHIL NFR BLD AUTO: 2.9 % — SIGNIFICANT CHANGE UP (ref 0–6)
HCT VFR BLD CALC: 29.1 % — LOW (ref 34.5–45)
HGB BLD-MCNC: 8.9 G/DL — LOW (ref 11.5–15.5)
IMM GRANULOCYTES NFR BLD AUTO: 0.3 % — SIGNIFICANT CHANGE UP (ref 0–0.9)
LYMPHOCYTES # BLD AUTO: 0.62 K/UL — LOW (ref 1–3.3)
LYMPHOCYTES # BLD AUTO: 10.7 % — LOW (ref 13–44)
MCHC RBC-ENTMCNC: 23.4 PG — LOW (ref 27–34)
MCHC RBC-ENTMCNC: 30.6 G/DL — LOW (ref 32–36)
MCV RBC AUTO: 76.6 FL — LOW (ref 80–100)
MONOCYTES # BLD AUTO: 0.51 K/UL — SIGNIFICANT CHANGE UP (ref 0–0.9)
MONOCYTES NFR BLD AUTO: 8.8 % — SIGNIFICANT CHANGE UP (ref 2–14)
NEUTROPHILS # BLD AUTO: 4.44 K/UL — SIGNIFICANT CHANGE UP (ref 1.8–7.4)
NEUTROPHILS NFR BLD AUTO: 76.8 % — SIGNIFICANT CHANGE UP (ref 43–77)
NRBC # BLD: 0 /100 WBCS — SIGNIFICANT CHANGE UP (ref 0–0)
NRBC BLD-RTO: 0 /100 WBCS — SIGNIFICANT CHANGE UP (ref 0–0)
PLATELET # BLD AUTO: 255 K/UL — SIGNIFICANT CHANGE UP (ref 150–400)
RBC # BLD: 3.8 M/UL — SIGNIFICANT CHANGE UP (ref 3.8–5.2)
RBC # FLD: 21.4 % — HIGH (ref 10.3–14.5)
WBC # BLD: 5.79 K/UL — SIGNIFICANT CHANGE UP (ref 3.8–10.5)
WBC # FLD AUTO: 5.79 K/UL — SIGNIFICANT CHANGE UP (ref 3.8–10.5)

## 2024-08-27 PROCEDURE — 99215 OFFICE O/P EST HI 40 MIN: CPT

## 2024-08-27 NOTE — PHYSICAL EXAM
[Restricted in physically strenuous activity but ambulatory and able to carry out work of a light or sedentary nature] : Status 1- Restricted in physically strenuous activity but ambulatory and able to carry out work of a light or sedentary nature, e.g., light house work, office work [Normal] : affect appropriate [de-identified] : iliostomy now closed; yellow discoloration of the dkin in the isi operative site; abdominal binder for comfort

## 2024-08-27 NOTE — REASON FOR VISIT
[Initial Consultation] : an initial consultation for [Blood Count Assessment] : blood count assessment [Spouse] : spouse [Other: _____] : [unfilled] [FreeTextEntry2] : anemia

## 2024-08-27 NOTE — ASSESSMENT
[Supportive] : Goals of care discussed with patient: Supportive [Palliative Care Plan] : not applicable at this time [FreeTextEntry1] : Candace Hernandez is a 58-year-old female with a prior history of Crohn's disease diagnosed after birth of first child 2004. She has he had waxing and waning of symptoms with one prior bowel resection 2004. First resection may have been one foot, and second resection may have also been a foot although she is not sure. She has a early (high) ileostomy requiring bag emptying every 2-3 hours of awake time. She is currently eating tree meals daily with snacks and has a nephologist monitoring her electrolytes weekly at this time (8 weeks into ileostomy). I provided printed educational material on the use and side effects of medication iron sucrose and vitamin 12 therapy; this will be supplemental medication to improve the anemia in setting of gastrointestinal malabsorption. The patient has signed consent for treatment.  Planned reversal 21 August 2024. Patient is cleared for reversal of ileostomy provided normal coagulation studies and completion of two doses of iron and vitamin B 12 therapy. RTC in one month.  08/27/2024 She has been in process of recovery; there was post operative bleeding in a normal platelet count and ileostomy has reversed. Physical examination is otherwise normal. the bruising is yellowish. Recommend three additional IV iron infusions to be given preferably on Saturday for work related issues. , reviewed of blood studies; actual improvement form prior studies in hospital. ferritin is 10; to give IV iron as bowel function is not adequate for vegetable iron. she and I discussed ingestion of red (bloody) meat hamburger and the like with superior GI absorption. RTC in October 2024.

## 2024-08-27 NOTE — HISTORY OF PRESENT ILLNESS
[Date: ____________] : Patient's last distress assessment performed on [unfilled]. [0 - No Distress] : Distress Level: 0 [90: Able to carry normal activity; minor signs or symptoms of disease.] : 90: Able to carry normal activity; minor signs or symptoms of disease.  [ECOG Performance Status: 1 - Restricted in physically strenuous activity but ambulatory and able to carry out work of a light or sedentary nature] : Performance Status: 1 - Restricted in physically strenuous activity but ambulatory and able to carry out work of a light or sedentary nature, e.g., light house work, office work [de-identified] : Ms Hernandez is a 58-year-old female with a history of need for an ileostomy reversal. She is referred for evaluation of anemia. The patient was managing her food intake in the setting of pain and distension  In 2023 she had stricture on MR and met Dr Betts. She was treated with Rinvoq by Dr Hathaway December 2024 (she had shingles vaccination). retreatment in February. Another MR March 2024 worsening. 29 May 2024 Bowel resection performed by Dr Betts in treatment of Crohn's enterocolitis. She has an ileostomy. Food appears in colostomy within one hour of ingestions. She is currently on home TPN; currently 10 hours until 6 AM; planning weaning now three days per week. no fever no chills or readmission to hospital.  Planning to reverse ileostomy 21 August 2024 surgeon will be Sawyer Morales [FreeTextEntry1] : discussion of IV iron infusion 200 mg and parenteral B 12 X 2 prior to surgery. [de-identified] : 08/27/2024 illiostomy reversal 08/21/2024 at [facility]; hospital stay 5 days. no transfusion. Hematoma on POD 1; further drainage further drainage on hospital day 2 re exploration 45 minutes with drainage. No transfusion. No fever only prophylactic antibiotic. No transfusion. Feels weak and has pain in the abdominal area. describes no prior history with falls.

## 2024-08-27 NOTE — RESULTS/DATA
[FreeTextEntry1] : 07/29/2024 HGB 9.3 g/dL normal platelets and white cells 08/27/2024 CBC WBC 5.79 HGB 8.9g/dL MCV 76.6  000 ferritin 10

## 2024-09-05 ENCOUNTER — APPOINTMENT (OUTPATIENT)
Dept: RHEUMATOLOGY | Facility: CLINIC | Age: 59
End: 2024-09-05
Payer: COMMERCIAL

## 2024-09-05 VITALS
HEART RATE: 76 BPM | SYSTOLIC BLOOD PRESSURE: 110 MMHG | DIASTOLIC BLOOD PRESSURE: 69 MMHG | TEMPERATURE: 98 F | RESPIRATION RATE: 17 BRPM | OXYGEN SATURATION: 98 %

## 2024-09-05 VITALS
HEART RATE: 73 BPM | SYSTOLIC BLOOD PRESSURE: 119 MMHG | DIASTOLIC BLOOD PRESSURE: 79 MMHG | RESPIRATION RATE: 17 BRPM | OXYGEN SATURATION: 98 % | TEMPERATURE: 98 F

## 2024-09-05 PROCEDURE — 96365 THER/PROPH/DIAG IV INF INIT: CPT

## 2024-09-05 RX ORDER — RISANKIZUMAB-RZAA 60 MG/ML
600 INJECTION INTRAVENOUS
Qty: 0 | Refills: 0 | Status: COMPLETED
Start: 2024-07-10

## 2024-09-05 NOTE — HISTORY OF PRESENT ILLNESS
[N/A] : N/A [Denies] : Denies [No] : No [Yes] : Yes [Declined] : Declined [Right upper extremity] : Right upper extremity [24g] : 24g [Start Time: ___] : Medication Start Time: [unfilled] [End Time: ___] : Medication End Time: [unfilled] [Medication Name: ___] : Medication Name: [unfilled] [Total Amount Administered: ___] : Total Amount Administered: [unfilled] [IV discontinued. Intact. No signs or symptoms of IV complications noted. Time: ___] : IV discontinued. Intact. No signs or symptoms of IV complications noted. Time: [unfilled] [Patient  instructed to seek medical attention with signs and symptoms of adverse effects] : Patient  instructed to seek medical attention with signs and symptoms of adverse effects [Patient left unit in no acute distress] : Patient left unit in no acute distress [Medications administered as ordered and tolerated well.] : Medications administered as ordered and tolerated well. [de-identified] : 4;30 pm [de-identified] : today is patients last loading dose.

## 2024-09-07 ENCOUNTER — APPOINTMENT (OUTPATIENT)
Dept: INFUSION THERAPY | Facility: HOSPITAL | Age: 59
End: 2024-09-07

## 2024-09-10 ENCOUNTER — APPOINTMENT (OUTPATIENT)
Dept: COLORECTAL SURGERY | Facility: CLINIC | Age: 59
End: 2024-09-10
Payer: COMMERCIAL

## 2024-09-10 VITALS
HEIGHT: 60 IN | DIASTOLIC BLOOD PRESSURE: 87 MMHG | SYSTOLIC BLOOD PRESSURE: 124 MMHG | OXYGEN SATURATION: 100 % | HEART RATE: 88 BPM | RESPIRATION RATE: 16 BRPM

## 2024-09-10 DIAGNOSIS — K50.813 CROHN'S DISEASE OF BOTH SMALL AND LARGE INTESTINE WITH FISTULA: ICD-10-CM

## 2024-09-10 PROCEDURE — 99024 POSTOP FOLLOW-UP VISIT: CPT

## 2024-09-14 ENCOUNTER — APPOINTMENT (OUTPATIENT)
Dept: INFUSION THERAPY | Facility: HOSPITAL | Age: 59
End: 2024-09-14

## 2024-09-17 NOTE — PHYSICAL EXAM
[No Rash or Lesion] : No rash or lesion [Alert] : alert [Oriented to Person] : oriented to person [Oriented to Place] : oriented to place [Calm] : calm [de-identified] : wound c/d  [de-identified] : wdwn female, nad [de-identified] : nc/at [facility] [de-identified] : no c/c/e noted

## 2024-09-17 NOTE — PHYSICAL EXAM
[No Rash or Lesion] : No rash or lesion [Alert] : alert [Oriented to Person] : oriented to person [Oriented to Place] : oriented to place [Calm] : calm [de-identified] : wound c/d  [de-identified] : wdwn female, nad [de-identified] : nc/at [facility] [de-identified] : no c/c/e noted

## 2024-09-17 NOTE — ASSESSMENT
[FreeTextEntry1] : 58 y.o female with crohns disease and hx of resection   She has subacute bowel obstruction with distention. I strongly do believe as stated and advised she should have surgery within 1 month, she would like to work until the end of the year. Will need bowel resection with possible temporary ileostomy (20%)  I am concerned about her and I am ready for the surgery whenever she is ready. I have provided her my cell phone number. She is going to need a resection of the previous anastomosis and sigmoid resection and lysis of additions and assessing the area of the rest of the small bowel. All risk benefits morbidity were explained she consents. 5-7 days in the hospital 4-6 weeks recovery.  5/23/2024 - She is prepared for surgery. We discussed the risk, benefits and alternatives and she consents.Disussed bowel prep - clear liquid diet. She will go to [facility]. She met with Shilpa and we discussed that she may need a temporary ileostomy.  9/10/2024 Follow up with Debby for perioperative diet follow up with GI to continue biologics/timing of colonoscopy follow up as needed

## 2024-09-17 NOTE — HISTORY OF PRESENT ILLNESS
[FreeTextEntry1] : Referred by Dr. 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 Betts) on lap SBR (2004)for a SBO She subsequently had intermittent symptoms and was not on systemic therapy for a long period of time Trialed Remicade 4 years ago with lip swelling so stopped Currently on Rinvoq  5/23/2024 Here for a pre-op visit, discussed procedure. Last Rinvoq 1 week ago. She is still able to eat. No change in bowel habits. No nausea/vomiting.  6/25/2024 Seen today for a post-op visit. She is 4 weeks post op (5/29) She has an ileostomy. Feeling well, getting better. Has TPN. Not on any pain medication.   8/20/2024 Here for a pre-op visit today. She has been doing well. She is off of TPN, still has her PICC line in place. She has gotten to infusions of Skyrizi (last dose 8/14) and she is scheduled to get her next one on 9/5. Feeling well, has lost a couple of pounds since stopping TPN. No nausea/no vomiting. She has a good appetite and energy.  9/10/2024 She is doing well. Here for a post op visit s/p ileostomy closure. She has a good appetite and is eating throughout the day. BMs are not urgent. No nausea/vomiting/bloating or distention. No chest pain, cough. No fever. Wound is healing

## 2024-09-20 ENCOUNTER — APPOINTMENT (OUTPATIENT)
Dept: INFUSION THERAPY | Facility: HOSPITAL | Age: 59
End: 2024-09-20

## 2024-09-23 DIAGNOSIS — D50.8 OTHER IRON DEFICIENCY ANEMIAS: ICD-10-CM

## 2024-10-03 ENCOUNTER — LABORATORY RESULT (OUTPATIENT)
Age: 59
End: 2024-10-03

## 2024-10-04 LAB
BASOPHILS # BLD AUTO: 0.15 K/UL
BASOPHILS NFR BLD AUTO: 2.6 %
EOSINOPHIL # BLD AUTO: 0 K/UL
EOSINOPHIL NFR BLD AUTO: 0 %
FERRITIN SERPL-MCNC: 157 NG/ML
FOLATE SERPL-MCNC: 14.8 NG/ML
HCT VFR BLD CALC: 41 %
HGB BLD-MCNC: 12.9 G/DL
IRON SATN MFR SERPL: 20 %
IRON SERPL-MCNC: 64 UG/DL
LYMPHOCYTES # BLD AUTO: 0.99 K/UL
LYMPHOCYTES NFR BLD AUTO: 17.4 %
MAN DIFF?: NORMAL
MCHC RBC-ENTMCNC: 25 PG
MCHC RBC-ENTMCNC: 31.5 GM/DL
MCV RBC AUTO: 79.3 FL
MONOCYTES # BLD AUTO: 0.2 K/UL
MONOCYTES NFR BLD AUTO: 3.5 %
NEUTROPHILS # BLD AUTO: 4.26 K/UL
NEUTROPHILS NFR BLD AUTO: 74.8 %
PLATELET # BLD AUTO: 253 K/UL
RBC # BLD: 5.17 M/UL
RBC # FLD: 23.1 %
TIBC SERPL-MCNC: 323 UG/DL
UIBC SERPL-MCNC: 259 UG/DL
VIT B12 SERPL-MCNC: >2000 PG/ML
WBC # FLD AUTO: 5.69 K/UL

## 2024-10-06 ENCOUNTER — OUTPATIENT (OUTPATIENT)
Dept: OUTPATIENT SERVICES | Facility: HOSPITAL | Age: 59
LOS: 1 days | Discharge: HOME | End: 2024-10-06

## 2024-10-06 DIAGNOSIS — Z90.711 ACQUIRED ABSENCE OF UTERUS WITH REMAINING CERVICAL STUMP: Chronic | ICD-10-CM

## 2024-10-06 DIAGNOSIS — D64.9 ANEMIA, UNSPECIFIED: ICD-10-CM

## 2024-10-06 DIAGNOSIS — Z98.890 OTHER SPECIFIED POSTPROCEDURAL STATES: Chronic | ICD-10-CM

## 2024-10-06 DIAGNOSIS — Z90.49 ACQUIRED ABSENCE OF OTHER SPECIFIED PARTS OF DIGESTIVE TRACT: Chronic | ICD-10-CM

## 2024-10-16 ENCOUNTER — APPOINTMENT (OUTPATIENT)
Dept: HEMATOLOGY ONCOLOGY | Facility: CLINIC | Age: 59
End: 2024-10-16
Payer: COMMERCIAL

## 2024-10-16 DIAGNOSIS — K50.90 CROHN'S DISEASE, UNSPECIFIED, W/OUT COMPLICATIONS: ICD-10-CM

## 2024-10-16 DIAGNOSIS — D50.8 OTHER IRON DEFICIENCY ANEMIAS: ICD-10-CM

## 2024-10-16 PROCEDURE — 99443: CPT

## 2024-10-16 PROCEDURE — 99448 NTRPROF PH1/NTRNET/EHR 21-30: CPT

## 2024-10-25 DIAGNOSIS — N81.4 UTEROVAGINAL PROLAPSE, UNSPECIFIED: ICD-10-CM

## 2024-10-25 DIAGNOSIS — Z43.2 ENCOUNTER FOR ATTENTION TO ILEOSTOMY: ICD-10-CM

## (undated) DEVICE — SUT VICRYL 2-0 27" SH

## (undated) DEVICE — PREP BETADINE KIT

## (undated) DEVICE — SOL INJ NS 0.9% 100ML

## (undated) DEVICE — POSITIONER PURPLE ARM ONE STEP (LARGE)

## (undated) DEVICE — DRSG DERMABOND 0.7ML

## (undated) DEVICE — VENODYNE/SCD SLEEVE CALF MEDIUM

## (undated) DEVICE — DRSG TELFA 3 X 8

## (undated) DEVICE — DRAPE GENERAL ENDOSCOPY

## (undated) DEVICE — Device

## (undated) DEVICE — SUT PDS PLUS 1 27" CT

## (undated) DEVICE — GOWN TRIMAX LG

## (undated) DEVICE — ELCTR BOVIE PENCIL HANDPIECE ROCKER SWITCH 15FT

## (undated) DEVICE — DRAPE UNDER BUTTOCKS W SCREEN

## (undated) DEVICE — DRAPE SURGICAL #1010

## (undated) DEVICE — SOL IRR POUR NS 0.9% 500ML

## (undated) DEVICE — GLV 8 PROTEXIS (WHITE)

## (undated) DEVICE — POSITIONER FOAM EGG CRATE ULNAR 2PCS (PINK)

## (undated) DEVICE — TUBING CAP SET ERBEFLO CLEVERCAP HYBRID CO2 FOR OLYMPUS SCOPES AND UCR

## (undated) DEVICE — DRAPE 1/2 SHEET 40X57"

## (undated) DEVICE — DRSG TAPE UMBILICAL COTTON 2" X 30 X 1/8"

## (undated) DEVICE — DRAPE IOBAN 23" X 23"

## (undated) DEVICE — DRAIN RESERVOIR FOR JACKSON PRATT 100CC CARDINAL

## (undated) DEVICE — PREP CHLORAPREP HI-LITE ORANGE 26ML

## (undated) DEVICE — SUT VICRYL PLUS 2-0 27" SH

## (undated) DEVICE — OSTOMY KIT 2-PIECE 2.75" NS (BLUE)

## (undated) DEVICE — TROCAR COVIDIEN VERSAPORT BLADELESS OPTICAL 12MM STANDARD

## (undated) DEVICE — PACK COLON BUNDLE

## (undated) DEVICE — DRAPE MAYO STAND 30"

## (undated) DEVICE — SUT MONOSOF 3-0 18" C-14

## (undated) DEVICE — VENODYNE/SCD SLEEVE CALF LARGE

## (undated) DEVICE — SUT MONOCRYL 4-0 18" PS-2

## (undated) DEVICE — PACK CYSTO

## (undated) DEVICE — WARMING BLANKET UPPER ADULT

## (undated) DEVICE — DRAPE IOBAN 13" X 13"

## (undated) DEVICE — ELCTR BOVIE TIP BLADE VALLEYLAB 6.5"

## (undated) DEVICE — DRAPE TOWEL BLUE 17" X 24"

## (undated) DEVICE — SUCTION YANKAUER OPEN TIP NO VENT CURVE

## (undated) DEVICE — PACK BASIN SPECIAL PROCEDURE

## (undated) DEVICE — DRAIN JACKSON PRATT 10MM FLAT FULL NO TROCAR

## (undated) DEVICE — SUT PDS II 1 27" CT

## (undated) DEVICE — TUBING STRYKEFLOW II SUCTION / IRRIGATOR

## (undated) DEVICE — PACK MAJOR ABDOMINAL SUPINE

## (undated) DEVICE — TUBING STRYKER PNEUMOCLEAR SMOKE HEAT HUMID

## (undated) DEVICE — FOLEY TRAY 16FR 5CC LF LUBRISIL ADVANCE TEMP CLOSED

## (undated) DEVICE — SUT VICRYL PLUS 3-0 27" SH

## (undated) DEVICE — NEPTUNE II 4-PORT MANIFOLD

## (undated) DEVICE — SPECIMEN CONTAINER 100ML

## (undated) DEVICE — CATH FOLEY STRIP TEMP-SENSING LATEX FREE 16FRX5CC

## (undated) DEVICE — POSITIONER PINK PAD PIGAZZI SYSTEM FULL KIT

## (undated) DEVICE — LIGASURE IMPACT

## (undated) DEVICE — SOL IRR POUR H2O 250ML

## (undated) DEVICE — OSTOMY STOMAHESIVE PASTE 2OZ TUBE

## (undated) DEVICE — TUBING RAPIDVAC SMOKE EVACUATOR .25" X 10FT

## (undated) DEVICE — SUT CHROMIC 1 54" REEL

## (undated) DEVICE — OSTOMY LOOP ROD EYELET BOTH END 2"

## (undated) DEVICE — OSTOMY KIT 2-PIECE 2.25" NS (RED)

## (undated) DEVICE — NDL COUNTER FOAM AND MAGNET 40-70

## (undated) DEVICE — TROCAR APPLIED MEDICAL KII BALLOON BLUNT TIP 12MM X 100MM

## (undated) DEVICE — DRAPE LEGGINGS XL

## (undated) DEVICE — OSTOMY LOOP ROD EYELET BOTH END 3"

## (undated) DEVICE — SUT CHROMIC 3-0 30" V-20

## (undated) DEVICE — TUBING PLUME AWAY 4.0

## (undated) DEVICE — SYR ASEPTO

## (undated) DEVICE — SUT CHROMIC 1 36" CTX

## (undated) DEVICE — SUT VICRYL 3-0 27" SH

## (undated) DEVICE — SUT CHROMIC 0 30" GS-21

## (undated) DEVICE — SUT POLYSORB 2-0 36" GS-21 UNDYED

## (undated) DEVICE — NDL HYPO SAFE 22G X 1.5" (BLACK)

## (undated) DEVICE — SUCTION YANKAUER NO CONTROL VENT

## (undated) DEVICE — SUT VICRYL 2-0 27" SH UNDYED

## (undated) DEVICE — TRAY IRRIGATION SYR BULB 60CC

## (undated) DEVICE — SUT PROLENE 0 30" SH

## (undated) DEVICE — TUBING TUR 2 PRONG

## (undated) DEVICE — DRSG CURITY GAUZE SPONGE 4 X 4" 12-PLY

## (undated) DEVICE — POSITIONER PINK PAD PIGAZZI SYSTEM

## (undated) DEVICE — TROCAR COVIDIEN VERSAPORT BLADELESS OPTICAL 5MM STANDARD

## (undated) DEVICE — LUBRICATING JELLY ONESHOT 1.25OZ

## (undated) DEVICE — SUT MONOCRYL 4-0 27" PS-2 UNDYED

## (undated) DEVICE — SUT POLYSORB 0 36" GU-46

## (undated) DEVICE — LIGASURE BLUNT TIP 37CM

## (undated) DEVICE — SUT POLYSORB 3-0 30" V-20 UNDYED

## (undated) DEVICE — D HELP - CLEARVIEW CLEARIFY SYSTEM